# Patient Record
Sex: MALE | Race: WHITE | NOT HISPANIC OR LATINO | Employment: FULL TIME | URBAN - METROPOLITAN AREA
[De-identification: names, ages, dates, MRNs, and addresses within clinical notes are randomized per-mention and may not be internally consistent; named-entity substitution may affect disease eponyms.]

---

## 2017-01-27 ENCOUNTER — OFFICE VISIT (OUTPATIENT)
Dept: SLEEP MEDICINE | Facility: CLINIC | Age: 52
End: 2017-01-27
Payer: COMMERCIAL

## 2017-01-27 VITALS
HEART RATE: 74 BPM | BODY MASS INDEX: 41.75 KG/M2 | DIASTOLIC BLOOD PRESSURE: 79 MMHG | HEIGHT: 73 IN | SYSTOLIC BLOOD PRESSURE: 145 MMHG | WEIGHT: 315 LBS | OXYGEN SATURATION: 94 %

## 2017-01-27 DIAGNOSIS — G47.33 OSA (OBSTRUCTIVE SLEEP APNEA): Primary | ICD-10-CM

## 2017-01-27 PROCEDURE — 99213 OFFICE O/P EST LOW 20 MIN: CPT | Performed by: PHYSICIAN ASSISTANT

## 2017-01-27 NOTE — PATIENT INSTRUCTIONS
Your BMI is There is no weight on file to calculate BMI.  Weight management is a personal decision.  If you are interested in exploring weight loss strategies, the following discussion covers the approaches that may be successful. Body mass index (BMI) is one way to tell whether you are at a healthy weight, overweight, or obese. It measures your weight in relation to your height.  A BMI of 18.5 to 24.9 is in the healthy range. A person with a BMI of 25 to 29.9 is considered overweight, and someone with a BMI of 30 or greater is considered obese. More than two-thirds of American adults are considered overweight or obese.  Being overweight or obese increases the risk for further weight gain. Excess weight may lead to heart disease and diabetes.  Creating and following plans for healthy eating and physical activity may help you improve your health.  Weight control is part of healthy lifestyle and includes exercise, emotional health, and healthy eating habits. Careful eating habits lifelong are the mainstay of weight control. Though there are significant health benefits from weight loss, long-term weight loss with diet alone may be very difficult to achieve- studies show long-term success with dietary management in less than 10% of people. Attaining a healthy weight may be especially difficult to achieve in those with severe obesity. In some cases, medications, devices and surgical management might be considered.  What can you do?  If you are overweight or obese and are interested in methods for weight loss, you should discuss this with your provider.     Consider reducing daily calorie intake by 500 calories.     Keep a food journal.     Avoiding skipping meals, consider cutting portions instead.    Diet combined with exercise helps maintain muscle while optimizing fat loss. Strength training is particularly important for building and maintaining muscle mass. Exercise helps reduce stress, increase energy, and  improves fitness. Increasing exercise without diet control, however, may not burn enough calories to loose weight.       Start walking three days a week 10-20 minutes at a time    Work towards walking thirty minutes five days a week     Eventually, increase the speed of your walking for 1-2 minutes at time    In addition, we recommend that you review healthy lifestyles and methods for weight loss available through the National Institutes of Health patient information sites:  http://win.niddk.nih.gov/publications/index.htm    And look into health and wellness programs that may be available through your health insurance provider, employer, local community center, or ingrid club.    Weight management plan: Patient was referred to their PCP to discuss a diet and exercise plan.

## 2017-01-27 NOTE — PROGRESS NOTES
Obstructive Sleep Apnea- PAP Follow-Up Visit:    Chief Complaint   Patient presents with     Sleep Problem     cpap f/u       Sharif Giraldo comes in today for follow-up of their very severe sleep apnea, managed with BPAP.  Download report shows 97% compliance, 7 hours and 50 minute average usage. Average IPAP 13/EPAP 10. AHI 0.8. He needs new supplies, due to mask and head gear breaking. Otherwise it is going very well, he has difficulty sleeping without it.         Past medical/surgical history, family history, social history, medications and allergies were reviewed.      Problem List:  Patient Active Problem List    Diagnosis Date Noted     Major depressive disorder, recurrent episode, mild (H) 09/23/2016     Priority: Medium     Thalassemia carrier 06/11/2013     Hypogonadism male 05/30/2013     LUH (obstructive sleep apnea)-very severe () 05/14/2013     Indications for Polysomnography 5/8/2013: The patient is a 47 y year old Male who is 6' and weighs 448.1 lbs.  His BMI equals 61.3.  The patients [place] sleepiness scale was 21.0 and neck size was 21.5.  A diagnostic polysomnogram was performed to evaluate for excessive sleepiness, snoring, and possible LUH. After 121.0 minutes of sleep time the patient exhibited sufficient respiratory events qualifying him for a CPAP trial which was then initiated.      Polysomnogram Data:  A full night polysomnogram was performed recording the standard physiologic parameters including EEG, EOG, EMG, EKG, nasal and oral airflow.  Respiratory parameters of chest and abdominal movements are recorded with respiratory inductance plethysmography.  Oxygen saturation was recorded by pulse oximetry.      Diagnostic PSG  Sleep Architecture:  The total recording time of the diagnostic portion of the study was 134.7 minutes.  The total sleep time was 121.0 minutes.  During the diagnostic portion of the study the sleep latency was 0.2 minutes.  REM latency was 116.0 minutes.   Sleep Efficiency was 89.8%.  Wake after sleep onset was 13.0.   The patient spent 21.5% of total sleep time in Stage N1, 69.8% in Stage N2, 2.9% in Stages N3 and 5.8% in REM.         Respiration:     Sustained Sleep Associated Hypoventilation - was present with a baseline TCM of 45 and a maximum TCM of 54. PaCO2 was 44.    Sleep Associated Hypoxemia - was present and was very severe.  Baseline oxygen saturation was 86.8%.  The lowest oxygen saturation was 32.0%.  Snoring was reported as moderately loud to loud.     Events - During the diagnostic portion of the study, the polysomnogram revealed a presence of 167 obstructive, 1 central, and 1 mixed apneas resulting in an Apnea index of 83.8 events per hour.  There were 38 hypopneas resulting in a Hypopnea index of 18.8 events per hour.  The combined Apnea/Hypopnea Index was 102.6 events per hour.  The REM AHI was 60.0.  The RERA index was 0 per hour.   The RDI was 103.    - 102.6     Treatment PSG  Sleep Architecture:  At 12:24 am the patient was placed on CPAP and then bi-level treatment was titrated.  The total recording time of the treatment portion of the study was 366.2 minutes.  The total sleep time was 350.5 minutes.  During the treatment portion of the study the sleep latency was 0 minutes.  REM latency was 66.0 minutes.  Sleep Efficiency was 95.7%.  Sleep Maintenance Efficiency was 96.0%.  Total wake time was 16.0 minutes for a total wake percentage of 3.8%. the patient spent 6.3% of total sleep time in Stage N1, 20.1% in Stage N2, 40.7% in Stages N3 and N, and 33.0% in REM.       Respiration:  CPAP was titrated from 5 cmH2O to 1 5cmH20 but oxygen desaturations persisted into the 70%s so bi-level PAP was initiated.  Bi-level was titrated to 20/49ybM81 in response to hypopneas and low O2 saturations.  The final pressure was 20.0/14.0 with an AHI of 11.0 all of which were central apneas. Supine REM noted at 18/13-12 cmH2O. AHI was 11 but all were centrals. TCM  returned to improved levels (38) with Bi-level.     Movement Activity:      Limb - During the diagnostic portion of the study, there were no limb movements recorded. During the treatment portion of the study, there were 376 limb movements recorded.  Of this total, 367 were classified as PLMs.  Of the PLMs, none were associated with arousals.  The Limb Movement index was 64.4 per hour while the PLM index was 62.8 per hour.     Behavior - none    Bruxism - none    Seizure - none      CARDIAC SUMMARY:   During the diagnostic portion of the study, the average pulse rate was 84.7 bpm.  The minimum pulse rate was 35.0 bpm while the maximum pulse rate was 124.3 bpm.    During the treatment portion of the study, the average pulse rate was 80.8 bpm.  The minimum pulse rate was 53.8 bpm while the maximum pulse rate was 125.1 bpm.       Assessment:    Very severe LUH () with adequate positive airway pressure titration.    Sleep associated hypoventilation secondary to obesity (OHS)  Recommendations:    Bi-level PAP pressure 18/13 cmH2O with clinical follow-up within 3 - 4 weeks including compliance measures. If pressure too high, or if AHI unacceptably high, proceed to full night CPAP/Bi-level PAP titration study.     Advise regarding the risks of drowsy driving    Suggest optimizing sleep hygiene and avoiding sleep deprivation    Weight management    Dopaminergic therapy should be used for management of restless leg syndrome (RLS) and not based on the presence of periodic limb movements alone.       Sleep related hypoventilation/hypoxemia in other disease 05/14/2013     Problem list name updated by automated process. Provider to review       Nocturia 04/18/2013     Anemia 04/18/2013     Hypertension goal BP (blood pressure) < 140/90 10/18/2012     CARDIOVASCULAR SCREENING; LDL GOAL LESS THAN 130 10/31/2010     Morbid obesity (H) 05/07/2008     Umbilical hernia 08/13/2002     Problem list name updated by automated  "process. Provider to review          /79 mmHg  Pulse 74  Ht 1.854 m (6' 1\")  Wt 176.903 kg (390 lb)  BMI 51.47 kg/m2  SpO2 94%        Impression/Plan:  Severe Sleep apnea. He is Tolerating PAP well. Daytime symptoms are improved..       Sharif CHATA Giraldo will follow up in about 1 year(s).     Fifteen minutes spent with patient, all of which were spent face-to-face counseling, consulting, coordinating plan of care.            CC:  Avery Zapata,         "

## 2017-01-27 NOTE — MR AVS SNAPSHOT
After Visit Summary   1/27/2017    Sharif Giraldo    MRN: [id number]           Patient Information     Date Of Birth          1965        Visit Information        Provider Department      1/27/2017 8:00 AM Raulito Greenberg PA-C [facility] [place]        Today's Diagnoses     LUH (obstructive sleep apnea)    -  1       Care Instructions      Your BMI is There is no weight on file to calculate BMI.  Weight management is a personal decision.  If you are interested in exploring weight loss strategies, the following discussion covers the approaches that may be successful. Body mass index (BMI) is one way to tell whether you are at a healthy weight, overweight, or obese. It measures your weight in relation to your height.  A BMI of 18.5 to 24.9 is in the healthy range. A person with a BMI of 25 to 29.9 is considered overweight, and someone with a BMI of 30 or greater is considered obese. More than two-thirds of American adults are considered overweight or obese.  Being overweight or obese increases the risk for further weight gain. Excess weight may lead to heart disease and diabetes.  Creating and following plans for healthy eating and physical activity may help you improve your health.  Weight control is part of healthy lifestyle and includes exercise, emotional health, and healthy eating habits. Careful eating habits lifelong are the mainstay of weight control. Though there are significant health benefits from weight loss, long-term weight loss with diet alone may be very difficult to achieve- studies show long-term success with dietary management in less than 10% of people. Attaining a healthy weight may be especially difficult to achieve in those with severe obesity. In some cases, medications, devices and surgical management might be considered.  What can you do?  If you are overweight or obese and are interested in methods for weight loss, you should discuss this with your  provider.     Consider reducing daily calorie intake by 500 calories.     Keep a food journal.     Avoiding skipping meals, consider cutting portions instead.    Diet combined with exercise helps maintain muscle while optimizing fat loss. Strength training is particularly important for building and maintaining muscle mass. Exercise helps reduce stress, increase energy, and improves fitness. Increasing exercise without diet control, however, may not burn enough calories to loose weight.       Start walking three days a week 10-20 minutes at a time    Work towards walking thirty minutes five days a week     Eventually, increase the speed of your walking for 1-2 minutes at time    In addition, we recommend that you review healthy lifestyles and methods for weight loss available through the National Institutes of Health patient information sites:  http://win.niddk.nih.gov/publications/index.htm    And look into health and wellness programs that may be available through your health insurance provider, employer, local community center, or ingrid club.    Weight management plan: Patient was referred to their PCP to discuss a diet and exercise plan.            Follow-ups after your visit        Who to contact     If you have questions or need follow up information about today's clinic visit or your schedule please contact [facility] directly at [phone].  Normal or non-critical lab and imaging results will be communicated to you by MyChart, letter or phone within 4 business days after the clinic has received the results. If you do not hear from us within 7 days, please contact the clinic through InnerWirelesshart or phone. If you have a critical or abnormal lab result, we will notify you by phone as soon as possible.  Submit refill requests through Appuri or call your pharmacy and they will forward the refill request to us. Please allow 3 business days for your refill to be completed.          Additional  "Information About Your Visit        MyChart Information     [employer] lets you send messages to your doctor, view your test results, renew your prescriptions, schedule appointments and more. To sign up, go to [email or web address] . Click on \"Log in\" on the left side of the screen, which will take you to the Welcome page. Then click on \"Sign up Now\" on the right side of the page.     You will be asked to enter the access code listed below, as well as some personal information. Please follow the directions to create your username and password.     Your access code is: [email or web address]  Expires: 2017  8:35 AM     Your access code will  in 90 days. If you need help or a new code, please call your [place] clinic or [phone].        Care EveryWhere ID     This is your Care EveryWhere ID. This could be used by other organizations to access your [place] medical records  [id number]        Your Vitals Were     Pulse Height BMI (Body Mass Index) Pulse Oximetry          74 1.854 m (6' 1\") 51.47 kg/m2 94%         Blood Pressure from Last 3 Encounters:   17 145/79   16 148/96   10/24/16 122/78    Weight from Last 3 Encounters:   17 176.903 kg (390 lb)   10/24/16 173.274 kg (382 lb)   16 174.635 kg (385 lb)              We Performed the Following     Comprehensive DME        Primary Care Provider Office Phone # Fax #    Avery Zapata [phone]       [facility] [address] [place] [address]        Thank you!     Thank you for choosing [facility]  for your care. Our goal is always to provide you with excellent care. Hearing back from our patients is one way we can continue to improve our services. Please take a few minutes to complete the written survey that you may receive in the mail after your visit with us. Thank you!             Your Updated Medication List - Protect others around you: Learn how to safely use, store " and throw away your medicines at www.disposemymeds.org.          This list is accurate as of: 1/27/17  8:35 AM.  Always use your most recent med list.                   Brand Name Dispense Instructions for use    calcium carb 1250 mg (500 mg [place])/vitamin D 200 units 500-200 MG-UNIT per tablet    OSCAL with D    1 tablet    Take 600mg daily       FLUoxetine 40 MG capsule    PROzac    30 capsule    TAKE ONE CAPSULE BY MOUTH EVERY DAY       GLUCOSAMINE 1500 COMPLEX Caps          lisinopril-hydrochlorothiazide 20-25 MG per tablet    PRINZIDE/ZESTORETIC    90 tablet    Take 1 tablet by mouth daily       meloxicam 15 MG tablet    MOBIC    90 tablet    TAKE ONE TABLET BY MOUTH EVERY DAY       order for DME     1 Device    Dispensed 1 Dorsal (Anterior) Night Splint, Size L/XL, with [facility] agreement signed by patient. Angy Garner [facility] [address], November 30, 2015       tadalafil 5 MG tablet    CIALIS    30 tablet    Take 1 tablet (5 mg) by mouth daily Never use with nitroglycerin, terazosin or doxazosin.       testosterone cypionate 200 MG/ML injection    DEPOTESTOTERONE CYPIONATE    1 mL    Inject 1 mL (200 mg) into the muscle every 28 days

## 2017-01-27 NOTE — NURSING NOTE
"Chief Complaint   Patient presents with     Sleep Problem     cpap f/u       Initial /79 mmHg  Pulse 74  Ht 1.854 m (6' 1\")  Wt 176.903 kg (390 lb)  BMI 51.47 kg/m2  SpO2 94% Estimated body mass index is 51.47 kg/(m^2) as calculated from the following:    Height as of this encounter: 1.854 m (6' 1\").    Weight as of this encounter: 176.903 kg (390 lb).  BP completed using cuff size: large    "

## 2017-03-03 ENCOUNTER — ALLIED HEALTH/NURSE VISIT (OUTPATIENT)
Dept: FAMILY MEDICINE | Facility: CLINIC | Age: 52
End: 2017-03-03
Payer: COMMERCIAL

## 2017-03-03 DIAGNOSIS — E29.1 HYPOGONADISM MALE: Primary | ICD-10-CM

## 2017-03-03 PROCEDURE — 96372 THER/PROPH/DIAG INJ SC/IM: CPT

## 2017-03-03 NOTE — NURSING NOTE
Chief Complaint   Patient presents with     Imm/Inj     testosterone injection     Prior to injection verified patient identity using patient's name and date of birth. Instucted to wait for 20minutes after injection.  Alexandra PANG MA

## 2017-03-03 NOTE — MR AVS SNAPSHOT
"              After Visit Summary   3/3/2017    Sharif Giraldo    MRN: [id number]           Patient Information     Date Of Birth          1965        Visit Information        Provider Department      3/3/2017 3:00 PM NL FLOAT TEAM D [facility]        Today's Diagnoses     Hypogonadism male    -  1       Follow-ups after your visit        Who to contact     If you have questions or need follow up information about today's clinic visit or your schedule please contact [facility] directly at [phone].  Normal or non-critical lab and imaging results will be communicated to you by Kuonahart, letter or phone within 4 business days after the clinic has received the results. If you do not hear from us within 7 days, please contact the clinic through Kuonahart or phone. If you have a critical or abnormal lab result, we will notify you by phone as soon as possible.  Submit refill requests through YoQueVos or call your pharmacy and they will forward the refill request to us. Please allow 3 business days for your refill to be completed.          Additional Information About Your Visit        MyChart Information     [employer] lets you send messages to your doctor, view your test results, renew your prescriptions, schedule appointments and more. To sign up, go to [email or web address] . Click on \"Log in\" on the left side of the screen, which will take you to the Welcome page. Then click on \"Sign up Now\" on the right side of the page.     You will be asked to enter the access code listed below, as well as some personal information. Please follow the directions to create your username and password.     Your access code is: [email or web address]  Expires: 2017  8:35 AM     Your access code will  in 90 days. If you need help or a new code, please call your [facility] or [phone].        Care EveryWhere ID     This is your Care EveryWhere ID. This could be used by other " organizations to access your [place] medical records  [id number]         Blood Pressure from Last 3 Encounters:   01/27/17 145/79   12/08/16 (!) 148/96   10/24/16 122/78    Weight from Last 3 Encounters:   01/27/17 (!) 390 lb (176.9 kg)   10/24/16 (!) 382 lb (173.3 kg)   09/23/16 (!) 385 lb (174.6 kg)              We Performed the Following     C TESTOSTERONE CYPIONATE INJECTION, 1 MG     INJECTION INTRAMUSCULAR OR SUB-Q        Primary Care Provider Office Phone # Fax #    Avery Zapata [phone]       [place] [address] [place] [address]        Thank you!     Thank you for choosing [facility]  for your care. Our goal is always to provide you with excellent care. Hearing back from our patients is one way we can continue to improve our services. Please take a few minutes to complete the written survey that you may receive in the mail after your visit with us. Thank you!             Your Updated Medication List - Protect others around you: Learn how to safely use, store and throw away your medicines at www.disposemymeds.org.          This list is accurate as of: 3/3/17  3:09 PM.  Always use your most recent med list.                   Brand Name Dispense Instructions for use    calcium carb 1250 mg (500 mg [place])/vitamin D 200 units 500-200 MG-UNIT per tablet    OSCAL with D    1 tablet    Take 600mg daily       FLUoxetine 40 MG capsule    PROzac    30 capsule    TAKE ONE CAPSULE BY MOUTH EVERY DAY       GLUCOSAMINE 1500 COMPLEX Caps          lisinopril-hydrochlorothiazide 20-25 MG per tablet    PRINZIDE/ZESTORETIC    90 tablet    Take 1 tablet by mouth daily       meloxicam 15 MG tablet    MOBIC    90 tablet    TAKE ONE TABLET BY MOUTH EVERY DAY       order for DME     1 Device    Dispensed 1 Dorsal (Anterior) Night Splint, Size L/XL, with [facility] agreement signed by patient. Angy Garner [facility], November 30, 2015       tadalafil 5 MG tablet     CIALIS    30 tablet    Take 1 tablet (5 mg) by mouth daily Never use with nitroglycerin, terazosin or doxazosin.       testosterone cypionate 200 MG/ML injection    DEPOTESTOTERONE CYPIONATE    1 mL    Inject 1 mL (200 mg) into the muscle every 28 days

## 2017-04-06 ENCOUNTER — ALLIED HEALTH/NURSE VISIT (OUTPATIENT)
Dept: FAMILY MEDICINE | Facility: CLINIC | Age: 52
End: 2017-04-06
Payer: COMMERCIAL

## 2017-04-06 DIAGNOSIS — E29.1 HYPOGONADISM MALE: Primary | ICD-10-CM

## 2017-04-06 DIAGNOSIS — M17.12 PRIMARY OSTEOARTHRITIS OF LEFT KNEE: ICD-10-CM

## 2017-04-06 PROCEDURE — 96372 THER/PROPH/DIAG INJ SC/IM: CPT

## 2017-04-06 NOTE — NURSING NOTE
Prior to injection verified patient identity using patient's name and date of birth.  Pt instructed to wait 20 minutes in clinic after injection.

## 2017-04-06 NOTE — MR AVS SNAPSHOT
"              After Visit Summary   2017    Sharif Giraldo    MRN: [id number]           Patient Information     Date Of Birth          1965        Visit Information        Provider Department      2017 4:00 PM NL FLOAT TEAM D [facility]        Today's Diagnoses     Hypogonadism male    -  1       Follow-ups after your visit        Who to contact     If you have questions or need follow up information about today's clinic visit or your schedule please contact [facility] directly at [phone].  Normal or non-critical lab and imaging results will be communicated to you by I-MDhart, letter or phone within 4 business days after the clinic has received the results. If you do not hear from us within 7 days, please contact the clinic through I-MDhart or phone. If you have a critical or abnormal lab result, we will notify you by phone as soon as possible.  Submit refill requests through tadoÂ° or call your pharmacy and they will forward the refill request to us. Please allow 3 business days for your refill to be completed.          Additional Information About Your Visit        MyChart Information     tadoÂ° lets you send messages to your doctor, view your test results, renew your prescriptions, schedule appointments and more. To sign up, go to [email or web address] . Click on \"Log in\" on the left side of the screen, which will take you to the Welcome page. Then click on \"Sign up Now\" on the right side of the page.     You will be asked to enter the access code listed below, as well as some personal information. Please follow the directions to create your username and password.     Your access code is: [email or web address]  Expires: 2017  9:35 AM     Your access code will  in 90 days. If you need help or a new code, please call your [facility] or [phone].        Care EveryWhere ID     This is your Care EveryWhere ID. This could be used by other " organizations to access your [place] medical records  [id number]         Blood Pressure from Last 3 Encounters:   01/27/17 145/79   12/08/16 (!) 148/96   10/24/16 122/78    Weight from Last 3 Encounters:   01/27/17 (!) 390 lb (176.9 kg)   10/24/16 (!) 382 lb (173.3 kg)   09/23/16 (!) 385 lb (174.6 kg)              We Performed the Following     C TESTOSTERONE CYPIONATE INJECTION, 1 MG     INJECTION INTRAMUSCULAR OR SUB-Q        Primary Care Provider Office Phone # Fax #    Avery Zaapta [phone]       [place] [id number] [place] [address]        Thank you!     Thank you for choosing [facility]  for your care. Our goal is always to provide you with excellent care. Hearing back from our patients is one way we can continue to improve our services. Please take a few minutes to complete the written survey that you may receive in the mail after your visit with us. Thank you!             Your Updated Medication List - Protect others around you: Learn how to safely use, store and throw away your medicines at www.disposemymeds.org.          This list is accurate as of: 4/6/17  4:20 PM.  Always use your most recent med list.                   Brand Name Dispense Instructions for use    calcium carb 1250 mg (500 mg [place])/vitamin D 200 units 500-200 MG-UNIT per tablet    OSCAL with D    1 tablet    Take 600mg daily       FLUoxetine 40 MG capsule    PROzac    30 capsule    TAKE 1 CAPSULE BY MOUTH DAILY       GLUCOSAMINE 1500 COMPLEX Caps          lisinopril-hydrochlorothiazide 20-25 MG per tablet    PRINZIDE/ZESTORETIC    90 tablet    Take 1 tablet by mouth daily       meloxicam 15 MG tablet    MOBIC    90 tablet    TAKE ONE TABLET BY MOUTH EVERY DAY       order for DME     1 Device    Dispensed 1 Dorsal (Anterior) Night Splint, Size L/XL, with [facility] agreement signed by patient. Angy Garner CMA, November 30, 2015       tadalafil 5 MG tablet    CIALIS     30 tablet    Take 1 tablet (5 mg) by mouth daily Never use with nitroglycerin, terazosin or doxazosin.       testosterone cypionate 200 MG/ML injection    DEPOTESTOTERONE CYPIONATE    1 mL    Inject 1 mL (200 mg) into the muscle every 28 days

## 2017-04-10 RX ORDER — MELOXICAM 15 MG/1
TABLET ORAL
Qty: 90 TABLET | Refills: 1 | Status: SHIPPED | OUTPATIENT
Start: 2017-04-10 | End: 2017-10-05

## 2017-04-10 NOTE — TELEPHONE ENCOUNTER
Prescription approved per [facility] Refill Protocol.    Carmelina Gore RN

## 2017-05-17 DIAGNOSIS — E29.1 HYPOGONADISM MALE: ICD-10-CM

## 2017-05-17 NOTE — TELEPHONE ENCOUNTER
Called and spoke to patient, will have provider refill and fax to [place] pharmacy when in clinic. Patient agrees. Zeynep Chaidez RN   Pharmacy Contact   Telephone Fax   [phone]

## 2017-05-17 NOTE — TELEPHONE ENCOUNTER
Reason for Call:  Other prescription    Detailed comments: Patient is requesting order for testosterone injection shot to his pharmacy. Please call.    Phone Number Patient can be reached at: Home number on file [phone] (home)    Best Time: any    Can we leave a detailed message on this number? YES    Call taken on 5/17/2017 at 12:20 PM by Anjali Jimenez

## 2017-05-18 RX ORDER — TESTOSTERONE CYPIONATE 200 MG/ML
200 INJECTION, SOLUTION INTRAMUSCULAR
Qty: 1 ML | Refills: 5 | Status: SHIPPED | OUTPATIENT
Start: 2017-05-18 | End: 2018-01-04

## 2017-05-19 ENCOUNTER — ALLIED HEALTH/NURSE VISIT (OUTPATIENT)
Dept: FAMILY MEDICINE | Facility: CLINIC | Age: 52
End: 2017-05-19
Payer: COMMERCIAL

## 2017-05-19 DIAGNOSIS — E29.1 HYPOGONADISM MALE: Primary | ICD-10-CM

## 2017-05-19 PROCEDURE — 99207 ZZC NO CHARGE NURSE ONLY: CPT

## 2017-05-19 NOTE — MR AVS SNAPSHOT
"              After Visit Summary   2017    Sharif Giraldo    MRN: [id number]           Patient Information     Date Of Birth          1965        Visit Information        Provider Department      2017 11:00 AM NL RN TEAM A, [facility]        Today's Diagnoses     Hypogonadism male    -  1       Follow-ups after your visit        Who to contact     If you have questions or need follow up information about today's clinic visit or your schedule please contact [facility] directly at [phone].  Normal or non-critical lab and imaging results will be communicated to you by Penzatahart, letter or phone within 4 business days after the clinic has received the results. If you do not hear from us within 7 days, please contact the clinic through Penzatahart or phone. If you have a critical or abnormal lab result, we will notify you by phone as soon as possible.  Submit refill requests through Utan or call your pharmacy and they will forward the refill request to us. Please allow 3 business days for your refill to be completed.          Additional Information About Your Visit        MyChart Information     Utan lets you send messages to your doctor, view your test results, renew your prescriptions, schedule appointments and more. To sign up, go to [email or web address] . Click on \"Log in\" on the left side of the screen, which will take you to the Welcome page. Then click on \"Sign up Now\" on the right side of the page.     You will be asked to enter the access code listed below, as well as some personal information. Please follow the directions to create your username and password.     Your access code is: [email or web address]  Expires: 2017 12:45 PM     Your access code will  in 90 days. If you need help or a new code, please call your [facility] or [phone].        Care EveryWhere ID     This is your Care EveryWhere ID. This could be used by other " organizations to access your [place] medical records  [id number]         Blood Pressure from Last 3 Encounters:   01/27/17 145/79   12/08/16 (!) 148/96   10/24/16 122/78    Weight from Last 3 Encounters:   01/27/17 (!) 390 lb (176.9 kg)   10/24/16 (!) 382 lb (173.3 kg)   09/23/16 (!) 385 lb (174.6 kg)              Today, you had the following     No orders found for display       Primary Care Provider Office Phone # Fax #    Avery Zapata [phone]       [facility] [address] [place] [address]        Thank you!     Thank you for choosing [facility]  for your care. Our goal is always to provide you with excellent care. Hearing back from our patients is one way we can continue to improve our services. Please take a few minutes to complete the written survey that you may receive in the mail after your visit with us. Thank you!             Your Updated Medication List - Protect others around you: Learn how to safely use, store and throw away your medicines at www.disposemymeds.org.          This list is accurate as of: 5/19/17 12:45 PM.  Always use your most recent med list.                   Brand Name Dispense Instructions for use    calcium carb 1250 mg (500 mg [place])/vitamin D 200 units 500-200 MG-UNIT per tablet    OSCAL with D    1 tablet    Take 600mg daily       FLUoxetine 40 MG capsule    PROzac    30 capsule    TAKE 1 CAPSULE BY MOUTH DAILY       GLUCOSAMINE 1500 COMPLEX Caps          lisinopril-hydrochlorothiazide 20-25 MG per tablet    PRINZIDE/ZESTORETIC    90 tablet    Take 1 tablet by mouth daily       meloxicam 15 MG tablet    MOBIC    90 tablet    TAKE ONE TABLET BY MOUTH EVERY DAY       order for DME     1 Device    Dispensed 1 Dorsal (Anterior) Night Splint, Size L/XL, with [facility] agreement signed by patient. Angy Garner CMA, November 30, 2015       tadalafil 5 MG tablet    CIALIS    30 tablet    Take 1 tablet (5 mg) by mouth daily  Never use with nitroglycerin, terazosin or doxazosin.       testosterone cypionate 200 MG/ML injection    DEPOTESTOTERONE    1 mL    Inject 1 mL (200 mg) into the muscle every 28 days

## 2017-05-19 NOTE — NURSING NOTE
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  Patient is educated on site, preventing infection, drawing up medication, and self-injection in the vastus-laterals.  Patient showed appropriate understanding and completed this injection without issue.  Patient states he will attempt this injection at home and if he is unable to do it, he will call back to get in clinic again.  RN called and gave verbal order to pharmacy for 1.5\" 25 guage needle and 3 mL syringes to be given monthly when he picks up prescription.    Patient has been informed to stay in clinic 20 minutes after injection.  Patient understands and agrees to this plan.  Closing this encounter.  Candace Fam RN      "

## 2017-05-30 ENCOUNTER — TELEPHONE (OUTPATIENT)
Dept: FAMILY MEDICINE | Facility: CLINIC | Age: 52
End: 2017-05-30

## 2017-05-30 DIAGNOSIS — S99.911A: Primary | ICD-10-CM

## 2017-05-30 NOTE — TELEPHONE ENCOUNTER
Reason for Call: Request for a referral:    Order or referral being requested: Patient is needing referral to an ankle doctor for insurance, patients states the same one from 12/2015.  Patient was advised that Dr Zapata is not in clinic today    Date needed: as soon as possible    Has the patient been seen by the PCP for this problem? YES    Additional comments:     Phone number Patient can be reached at:  Home number on file [phone] (home)    Best Time:  any    Can we leave a detailed message on this number?  YES    Call taken on 5/30/2017 at 7:50 AM by Amy Hector

## 2017-05-31 NOTE — TELEPHONE ENCOUNTER
Per Dr. Benny cortez for podiatry referral for ankle pain. He has seen Dr. Leung in the past for this same issue but needs a new referral. Bharti montilla you please help him with this. KB/CMA

## 2017-06-15 ENCOUNTER — RADIANT APPOINTMENT (OUTPATIENT)
Dept: GENERAL RADIOLOGY | Facility: CLINIC | Age: 52
End: 2017-06-15
Attending: PODIATRIST
Payer: COMMERCIAL

## 2017-06-15 ENCOUNTER — OFFICE VISIT (OUTPATIENT)
Dept: PODIATRY | Facility: CLINIC | Age: 52
End: 2017-06-15
Payer: COMMERCIAL

## 2017-06-15 VITALS — BODY MASS INDEX: 41.75 KG/M2 | WEIGHT: 315 LBS | TEMPERATURE: 97 F | HEIGHT: 73 IN

## 2017-06-15 DIAGNOSIS — M76.821 POSTERIOR TIBIAL TENDONITIS, RIGHT: ICD-10-CM

## 2017-06-15 DIAGNOSIS — M24.074 LOOSE BODY IN ANKLE AND FOOT JOINT, RIGHT: ICD-10-CM

## 2017-06-15 DIAGNOSIS — M24.071 LOOSE BODY IN ANKLE AND FOOT JOINT, RIGHT: ICD-10-CM

## 2017-06-15 DIAGNOSIS — M19.071 PRIMARY LOCALIZED OSTEOARTHROSIS, ANKLE AND FOOT, RIGHT: ICD-10-CM

## 2017-06-15 DIAGNOSIS — M65.979 SYNOVITIS OF ANKLE: Primary | ICD-10-CM

## 2017-06-15 DIAGNOSIS — M65.979 SYNOVITIS OF ANKLE: ICD-10-CM

## 2017-06-15 PROCEDURE — 20605 DRAIN/INJ JOINT/BURSA W/O US: CPT | Mod: RT | Performed by: PODIATRIST

## 2017-06-15 PROCEDURE — 99213 OFFICE O/P EST LOW 20 MIN: CPT | Mod: 25 | Performed by: PODIATRIST

## 2017-06-15 PROCEDURE — 73610 X-RAY EXAM OF ANKLE: CPT | Mod: TC

## 2017-06-15 ASSESSMENT — PAIN SCALES - GENERAL: PAINLEVEL: MILD PAIN (2)

## 2017-06-15 NOTE — NURSING NOTE
"Chief Complaint   Patient presents with     RECHECK     Right ankle synovitis, pain 2-8, wears ankle brace daily; XR Right ankle today, 6/15/2017; LOV 2/1/2016       Initial Temp 97  F (36.1  C) (Temporal)  Ht 6' 1\" (1.854 m)  Wt (!) 393 lb (178.3 kg)  BMI 51.85 kg/m2 Estimated body mass index is 51.85 kg/(m^2) as calculated from the following:    Height as of this encounter: 6' 1\" (1.854 m).    Weight as of this encounter: 393 lb (178.3 kg).  BP completed using cuff size: NA (Not Taken)  Medication Reconciliation: complete    Angy Garner CMA, Donna 15, 2017    "

## 2017-06-15 NOTE — MR AVS SNAPSHOT
"              After Visit Summary   6/15/2017    Sharif Giraldo    MRN: [id number]           Patient Information     Date Of Birth          1965        Visit Information        Provider Department      6/15/2017 7:45 AM Beni Leung DPM [facility]        Today's Diagnoses     Synovitis of ankle    -  1    Primary localized osteoarthrosis, ankle and foot, right        Posterior tibial tendonitis, right        Loose body in ankle and foot joint, right          Care Instructions    Light duty today. Continue appropriate shoe gear and orthotic.          Follow-ups after your visit        Who to contact     If you have questions or need follow up information about today's clinic visit or your schedule please contact [facility] directly at [phone].  Normal or non-critical lab and imaging results will be communicated to you by Pingboardhart, letter or phone within 4 business days after the clinic has received the results. If you do not hear from us within 7 days, please contact the clinic through Pingboardhart or phone. If you have a critical or abnormal lab result, we will notify you by phone as soon as possible.  Submit refill requests through [employer] or call your pharmacy and they will forward the refill request to us. Please allow 3 business days for your refill to be completed.          Additional Information About Your Visit        Pingboardhart Information     [employer] lets you send messages to your doctor, view your test results, renew your prescriptions, schedule appointments and more. To sign up, go to [email or web address] . Click on \"Log in\" on the left side of the screen, which will take you to the Welcome page. Then click on \"Sign up Now\" on the right side of the page.     You will be asked to enter the access code listed below, as well as some personal information. Please follow the directions to create your username and password.     Your access code is: " "[email or web address]  Expires: 2017 12:45 PM     Your access code will  in 90 days. If you need help or a new code, please call your [facility] or [phone].        Care EveryWhere ID     This is your Care EveryWhere ID. This could be used by other organizations to access your [place] medical records  [id number]        Your Vitals Were     Temperature Height BMI (Body Mass Index)             97  F (36.1  C) (Temporal) 6' 1\" (1.854 m) 51.85 kg/m2          Blood Pressure from Last 3 Encounters:   17 145/79   16 (!) 148/96   10/24/16 122/78    Weight from Last 3 Encounters:   06/15/17 (!) 393 lb (178.3 kg)   17 (!) 390 lb (176.9 kg)   10/24/16 (!) 382 lb (173.3 kg)              We Performed the Following     DRAIN/INJECT MEDIUM JOINT/BURSA     Kenalog 20 MG  []          Today's Medication Changes          These changes are accurate as of: 6/15/17  8:28 AM.  If you have any questions, ask your nurse or doctor.               Start taking these medicines.        Dose/Directions    triamcinolone acetonide 10 MG/ML injection   Commonly known as:  KENALOG   Used for:  Synovitis of ankle, Primary localized osteoarthrosis, ankle and foot, right, Posterior tibial tendonitis, right   Started by:  Beni Leung DPM        Dose:  20 mg   2 mLs (20 mg) by INTRA-ARTICULAR route once for 1 dose   Quantity:  2 mL   Refills:  0            Where to get your medicines      Some of these will need a paper prescription and others can be bought over the counter.  Ask your nurse if you have questions.     You don't need a prescription for these medications     triamcinolone acetonide 10 MG/ML injection                Primary Care Provider Office Phone # Fax #    Avery Zapata [phone]       [place] [id number] [place] [address]        Thank you!     Thank you for choosing [facility]  for your care. Our goal is always to provide " you with excellent care. Hearing back from our patients is one way we can continue to improve our services. Please take a few minutes to complete the written survey that you may receive in the mail after your visit with us. Thank you!             Your Updated Medication List - Protect others around you: Learn how to safely use, store and throw away your medicines at www.disposemymeds.org.          This list is accurate as of: 6/15/17  8:28 AM.  Always use your most recent med list.                   Brand Name Dispense Instructions for use    calcium carb 1250 mg (500 mg [place])/vitamin D 200 units 500-200 MG-UNIT per tablet    OSCAL with D    1 tablet    Take 600mg daily       FLUoxetine 40 MG capsule    PROzac    30 capsule    TAKE 1 CAPSULE BY MOUTH DAILY       GLUCOSAMINE 1500 COMPLEX Caps          lisinopril-hydrochlorothiazide 20-25 MG per tablet    PRINZIDE/ZESTORETIC    90 tablet    Take 1 tablet by mouth daily       meloxicam 15 MG tablet    MOBIC    90 tablet    TAKE ONE TABLET BY MOUTH EVERY DAY       order for DME     1 Device    Dispensed 1 Dorsal (Anterior) Night Splint, Size L/XL, with [facility] agreement signed by patient. Angy Garner, [facility], November 30, 2015       tadalafil 5 MG tablet    CIALIS    30 tablet    Take 1 tablet (5 mg) by mouth daily Never use with nitroglycerin, terazosin or doxazosin.       testosterone cypionate 200 MG/ML injection    DEPOTESTOTERONE    1 mL    Inject 1 mL (200 mg) into the muscle every 28 days       triamcinolone acetonide 10 MG/ML injection    KENALOG    2 mL    2 mLs (20 mg) by INTRA-ARTICULAR route once for 1 dose

## 2017-06-15 NOTE — PROGRESS NOTES
"Chief Complaint   Patient presents with     RECHECK     Right ankle synovitis, pain 2-8, wears ankle brace daily; XR Right ankle today, 6/15/2017; LOV 2/1/2016       Weight management plan: Patient was referred to their PCP to discuss a diet and exercise plan.     HPI:  Right ankle pain about anterior, lateral medial and posterior.  Aching pain and popping and grinding and limping.   Also some sciatic historical pain not now.  Works at a computer all day. Was no pain for one year after injection then about 4/17 upon increase activity pain return slowly. Uses ankle brace.  Shoes are one month old.  One right ankle hyperextension upon falling out of truck about 1994.     EXAM:Vitals: Temp 97  F (36.1  C) (Temporal)  Ht 6' 1\" (1.854 m)  Wt (!) 393 lb (178.3 kg)  BMI 51.85 kg/m2  BMI= Body mass index is 51.85 kg/(m^2).    General appearance: Patient is alert and fully cooperative with history & exam.  No sign of distress is noted during the visit.     Psychiatric: Affect is pleasant & appropriate.  Patient appears motivated to improve health.     Respiratory: Breathing is regular & unlabored while sitting.     HEENT: Hearing is intact to spoken word.  Speech is clear.  No gross evidence of visual impairment that would impact ambulation.     Vascular: DP & PT pulses are intact & regular bilaterally.  No significant edema or varicosities noted.  CFT and skin temperature is normal to both lower extremities.     Neurologic: Lower extremity sensation is intact to light touch.  No evidence of weakness or contracture in the lower extremities.  No evidence of neuropathy.    Dermatologic: Skin is intact to both lower extremities without significant lesions, rash or abrasion.  No paronychia or evidence of soft tissue infection is noted.     Musculoskeletal: Patient is ambulatory with custom molded orthotics. Generalized pes valgus noted. Patient is obese. Adequate range of motion throughout bilateral range of motion of the " ankles with some crepitus bilateral ankles. Previously pain had been noted to the Achilles tendon however today this is most noted along the medial anterior and lateral ankle joint margin. There is clearly some pain along the course of the posterior tibial tendon however no pain with muscle strength testing of the posterior tibial tendon and no localized edema within the posterior tibial tendon. Most pain appears to be within the ankle mortise.    Radiographs: 3 views right ankle weightbearing Demonstrate mild degenerative changes with joint space narrowing and flattening of the ankle mortise with loose body about the medial malleolus.     ASSESSMENT:       ICD-10-CM    1. Synovitis of ankle M65.9 XR Ankle Right G/E 3 Views     DRAIN/INJECT MEDIUM JOINT/BURSA     Kenalog 20 MG  []     triamcinolone acetonide (KENALOG) 10 MG/ML injection   2. Primary localized osteoarthrosis, ankle and foot, right M19.071 XR Ankle Right G/E 3 Views     DRAIN/INJECT MEDIUM JOINT/BURSA     Kenalog 20 MG  []     triamcinolone acetonide (KENALOG) 10 MG/ML injection   3. Posterior tibial tendonitis, right M76.821 XR Ankle Right G/E 3 Views     DRAIN/INJECT MEDIUM JOINT/BURSA     Kenalog 20 MG  []     triamcinolone acetonide (KENALOG) 10 MG/ML injection   4. Loose body in ankle and foot joint, right M24.071     M24.074         PLAN:  Review[facility] in [facility].      11/30/2015   Start night splint for achilles tendonitis until gone for 2 weeks.  Refill mobic for short term follow up with originally prescribing provider or PCP for long term meds as it helped his knee.    Right ankle brace and new orthotics have been helpful  Follow up as needed.     1/11/16  Right medial ankle is main prob.  Still some left ankle pain and some left knee pain.   Works as seated job.   Discussed injection vs imaging today and he wishes to inject.    I obtained informed consent, and injected the right ankle from a medial approach with 20  mg of Kenalog and lidocaine with a sterile dressing applied. We discussed potential risks and complications  Follow-up in one month to determine benefits of the injection. He may be a candidate for ankle arthroscope.  If this does not provide long-term relief we may consider imaging to evaluate joint versus posterior tibial tendon  Continue custom molded orthotics which he has used for 20 years recently replaced 10/15  Continue appropriate shoe gear.    All questions were answered    2/1/16  One right ankle injection provided 80% relief.   He would like to continue with his custom orthotics, right ankle brace and one injection  If this remains symptomatic I would recommend MRI and likely progress to ankle scope  However if this provides long-term relief we may consider repeat injection in time    We discussed potential benefits of alternative treatment options as well as their outcome and postoperative course all questions were answered.    6/15/2017  Patient reports after more than a year follow-up with continued right ankle pain and now Achilles tendon pain.  We discussed etiology and treatment options as well as further diagnostic options  Obtained and interpreted radiographs today  Right ankle injection, 2nd one.  I obtained informed consent, discussed risks and alternative treatments, prepped with ETOH, and injected 20 mg Kenalog and lidocaine about the right ankle.  Sterile dressing applied.     Continue ankle brace as needed, good shoes with straight shank, custom molded orthotics which are in good repair.       Beni Leung DPM

## 2017-07-12 DIAGNOSIS — I10 ESSENTIAL HYPERTENSION WITH GOAL BLOOD PRESSURE LESS THAN 140/90: ICD-10-CM

## 2017-07-13 NOTE — TELEPHONE ENCOUNTER
lisinopril-hydrochlorothiazide (PRINZIDE,ZESTORETIC) 20-25 MG per tablet      Last Written Prescription Date: 9/23/16  Last Fill Quantity: 90, # refills: 2  Last Office Visit with G, P or [facility] prescribing provider: 10/24/16       Potassium   Date Value Ref Range Status   09/23/2016 4.1 3.4 - 5.3 mmol/L Final     Creatinine   Date Value Ref Range Status   09/23/2016 0.70 0.66 - 1.25 mg/dL Final     BP Readings from Last 3 Encounters:   01/27/17 145/79   12/08/16 (!) 148/96   10/24/16 122/78

## 2017-07-14 NOTE — TELEPHONE ENCOUNTER
Routing refill request to provider for review/approval because:  Labs out of range:  BP  Candace Fam RN

## 2017-07-17 RX ORDER — LISINOPRIL AND HYDROCHLOROTHIAZIDE 20; 25 MG/1; MG/1
1 TABLET ORAL DAILY
Qty: 90 TABLET | Refills: 0 | Status: SHIPPED | OUTPATIENT
Start: 2017-07-17 | End: 2017-10-05

## 2017-10-05 ENCOUNTER — OFFICE VISIT (OUTPATIENT)
Dept: FAMILY MEDICINE | Facility: CLINIC | Age: 52
End: 2017-10-05
Payer: COMMERCIAL

## 2017-10-05 VITALS
BODY MASS INDEX: 41.75 KG/M2 | HEART RATE: 111 BPM | WEIGHT: 315 LBS | SYSTOLIC BLOOD PRESSURE: 124 MMHG | DIASTOLIC BLOOD PRESSURE: 78 MMHG | HEIGHT: 73 IN | TEMPERATURE: 98 F | OXYGEN SATURATION: 97 %

## 2017-10-05 DIAGNOSIS — Z23 NEED FOR PROPHYLACTIC VACCINATION AND INOCULATION AGAINST INFLUENZA: ICD-10-CM

## 2017-10-05 DIAGNOSIS — Z11.59 NEED FOR HEPATITIS C SCREENING TEST: ICD-10-CM

## 2017-10-05 DIAGNOSIS — M17.12 PRIMARY OSTEOARTHRITIS OF LEFT KNEE: ICD-10-CM

## 2017-10-05 DIAGNOSIS — Z00.00 ROUTINE GENERAL MEDICAL EXAMINATION AT A HEALTH CARE FACILITY: Primary | ICD-10-CM

## 2017-10-05 DIAGNOSIS — I10 ESSENTIAL HYPERTENSION WITH GOAL BLOOD PRESSURE LESS THAN 140/90: ICD-10-CM

## 2017-10-05 DIAGNOSIS — Z13.6 CARDIOVASCULAR SCREENING; LDL GOAL LESS THAN 130: ICD-10-CM

## 2017-10-05 LAB
ALBUMIN SERPL-MCNC: 3.6 G/DL (ref 3.4–5)
ALP SERPL-CCNC: 89 U/L (ref 40–150)
ALT SERPL W P-5'-P-CCNC: 40 U/L (ref 0–70)
ANION GAP SERPL CALCULATED.3IONS-SCNC: 10 MMOL/L (ref 3–14)
AST SERPL W P-5'-P-CCNC: 21 U/L (ref 0–45)
BILIRUB SERPL-MCNC: 0.6 MG/DL (ref 0.2–1.3)
BUN SERPL-MCNC: 15 MG/DL (ref 7–30)
CALCIUM SERPL-MCNC: 8.8 MG/DL (ref 8.5–10.1)
CHLORIDE SERPL-SCNC: 107 MMOL/L (ref 94–109)
CHOLEST SERPL-MCNC: 123 MG/DL
CO2 SERPL-SCNC: 27 MMOL/L (ref 20–32)
CREAT SERPL-MCNC: 0.72 MG/DL (ref 0.66–1.25)
GFR SERPL CREATININE-BSD FRML MDRD: >90 ML/MIN/1.7M2
GLUCOSE SERPL-MCNC: 97 MG/DL (ref 70–99)
HCV AB SERPL QL IA: NONREACTIVE
HDLC SERPL-MCNC: 48 MG/DL
LDLC SERPL CALC-MCNC: 57 MG/DL
NONHDLC SERPL-MCNC: 75 MG/DL
POTASSIUM SERPL-SCNC: 4.2 MMOL/L (ref 3.4–5.3)
PROT SERPL-MCNC: 6.7 G/DL (ref 6.8–8.8)
SODIUM SERPL-SCNC: 144 MMOL/L (ref 133–144)
TRIGL SERPL-MCNC: 90 MG/DL

## 2017-10-05 PROCEDURE — 90471 IMMUNIZATION ADMIN: CPT | Performed by: FAMILY MEDICINE

## 2017-10-05 PROCEDURE — 99396 PREV VISIT EST AGE 40-64: CPT | Mod: 25 | Performed by: FAMILY MEDICINE

## 2017-10-05 PROCEDURE — 36415 COLL VENOUS BLD VENIPUNCTURE: CPT | Performed by: FAMILY MEDICINE

## 2017-10-05 PROCEDURE — 80061 LIPID PANEL: CPT | Performed by: FAMILY MEDICINE

## 2017-10-05 PROCEDURE — 80053 COMPREHEN METABOLIC PANEL: CPT | Performed by: FAMILY MEDICINE

## 2017-10-05 PROCEDURE — 86803 HEPATITIS C AB TEST: CPT | Performed by: FAMILY MEDICINE

## 2017-10-05 PROCEDURE — 90688 IIV4 VACCINE SPLT 0.5 ML IM: CPT | Performed by: FAMILY MEDICINE

## 2017-10-05 RX ORDER — LISINOPRIL AND HYDROCHLOROTHIAZIDE 20; 25 MG/1; MG/1
1 TABLET ORAL DAILY
Qty: 90 TABLET | Refills: 0 | Status: SHIPPED | OUTPATIENT
Start: 2017-10-05 | End: 2018-01-13

## 2017-10-05 RX ORDER — MELOXICAM 15 MG/1
15 TABLET ORAL DAILY
Qty: 90 TABLET | Refills: 3 | Status: SHIPPED | OUTPATIENT
Start: 2017-10-05 | End: 2018-10-17

## 2017-10-05 RX ORDER — CALCIUM CARBONATE 300MG(750)
400 TABLET,CHEWABLE ORAL
COMMUNITY

## 2017-10-05 NOTE — PROGRESS NOTES
SUBJECTIVE:   CC: Sharif Giraldo is an 52 year old male who presents for preventative health visit.     Physical   Annual:     Getting at least 3 servings of Calcium per day::  Yes    Bi-annual eye exam::  Yes    Dental care twice a year::  Yes    Sleep apnea or symptoms of sleep apnea::  Sleep apnea    Diet::  Regular (no restrictions)    Taking medications regularly::  Yes    Medication side effects::  Not applicable    Additional concerns today::  No                Today's PHQ-2 Score: PHQ-2 ( 1999 Pfizer) 10/24/2016   Q1: Little interest or pleasure in doing things 2   Q2: Feeling down, depressed or hopeless 2   PHQ-2 Score 4       Abuse: Current or Past(Physical, Sexual or Emotional)- No  Do you feel safe in your environment - Yes    Social History   Substance Use Topics     Smoking status: Current Every Day Smoker     Packs/day: 0.01     Years: 10.00     Types: Cigars     Smokeless tobacco: Never Used      Comment:  cigars 2/ day     Alcohol use 0.0 oz/week     0 Standard drinks or equivalent per week      Comment: 1-2     About once a week    Last PSA:   PSA   Date Value Ref Range Status   06/10/2016 1.10 0 - 4 ug/L Final       Reviewed orders with patient. Reviewed health maintenance and updated orders accordingly - Yes      Reviewed and updated as needed this visit by clinical staff         Reviewed and updated as needed this visit by Provider        Past Medical History:   Diagnosis Date     HTN (hypertension) 2012     Morbid obesity with BMI of 60.0-69.9, adult (H)      LUH (obstructive sleep apnea) 5/2013    Severe       Past Surgical History:   Procedure Laterality Date     C AFF EYE MUSCLE SURG PROC UNLISTED  1970,1974     C ANESTH,REPAIR LO ABD HERNIA NOS  2000,2001    umbilical area     COLONOSCOPY  9/20/2013    Procedure: COLONOSCOPY;  Colonoscopy;  Surgeon: Avery Morales MD;  Location:  GI     HC EXCISE EXCESS SKIN TISSUE,OTHER      Hx; staph complications of hernia surg     HC  KNEE SCOPE, DIAGNOSTIC  1999,2000    Arthroscopy, Knee     HC REMOVAL OF TONSILS,<13 Y/O  1973    Tonsils <10y.o.     HC REPAIR INITIAL INCISIONAL HERNIA; INCARCERATED OR STRANGULATED  09/25/2002    Mesh repair of incarcerated incisional hernia, recurrent.     HERNIA REPAIR, INCISIONAL  10/09/08    Ventral herniorrhaphy/mesh placement.  Excision left elbow mass.       ROS:  C: NEGATIVE for fever, chills, change in weight  I: NEGATIVE for worrisome rashes, moles or lesions  E: NEGATIVE for vision changes or irritation  ENT: NEGATIVE for ear, mouth and throat problems  R: NEGATIVE for significant cough or SOB  CV: NEGATIVE for chest pain, palpitations or peripheral edema  GI: NEGATIVE for nausea, abdominal pain, heartburn, or change in bowel habits   male: negative for dysuria, hematuria, decreased urinary stream, erectile dysfunction, urethral discharge  M: NEGATIVE for significant arthralgias or myalgia  N: NEGATIVE for weakness, dizziness or paresthesias  P: NEGATIVE for changes in mood or affect    OBJECTIVE:   There were no vitals taken for this visit.    EXAM:  GENERAL: healthy, alert and no distress  EYES: Eyes grossly normal to inspection, PERRL and conjunctivae and sclerae normal  HENT: ear canals and TM's normal, nose and mouth without ulcers or lesions  NECK: no adenopathy, no asymmetry, masses, or scars and thyroid normal to palpation  RESP: lungs clear to auscultation - no rales, rhonchi or wheezes  CV: regular rate and rhythm, normal S1 S2, no S3 or S4, no murmur, click or rub, no peripheral edema and peripheral pulses strong  ABDOMEN: soft, nontender, no hepatosplenomegaly, no masses and bowel sounds normal  MS: no gross musculoskeletal defects noted, no edema  SKIN: no suspicious lesions or rashes  NEURO: Normal strength and tone, mentation intact and speech normal  PSYCH: mentation appears normal, affect normal/bright     ASSESSMENT/PLAN:   1. Routine general medical examination at a [facility]  "facility  Generally feeling okay. Trouble losing weight    2. Essential hypertension with goal blood pressure less than 140/90  We'll notify him with lab results. Renew medications.  - lisinopril-hydrochlorothiazide (PRINZIDE/ZESTORETIC) 20-25 MG per tablet; Take 1 tablet by mouth daily .  Dispense: 90 tablet; Refill: 0  - Comprehensive metabolic panel (BMP + Alb, Alk Phos, ALT, AST, Total. Bili, TP)    3. Primary osteoarthritis of left knee  Seems to work well.  - meloxicam (MOBIC) 15 MG tablet; Take 1 tablet (15 mg) by mouth daily  Dispense: 90 tablet; Refill: 3    4. CARDIOVASCULAR SCREENING; LDL GOAL LESS THAN 130  Notify of lab results.  - Lipid Profile    5. Need for hepatitis C screening test    - Hepatitis C antibody    6. Need for prophylactic vaccination and inoculation against influenza    - FLU VACCINE, 3 YRS +, IM (QUADRIVALENT W/PRESERVATIVES/MULTI-DOSE) [58521]  - Vaccine Administration, Initial [00149]    COUNSELING:   Reviewed preventive health counseling, as reflected in patient instructions       Regular exercise       Healthy diet/nutrition       Vision screening         reports that he has been smoking Cigars.  He has a 0.10 pack-year smoking history. He has never used smokeless tobacco.      Estimated body mass index is 51.85 kg/(m^2) as calculated from the following:    Height as of 6/15/17: 6' 1\" (1.854 m).    Weight as of 6/15/17: 393 lb (178.3 kg).   Weight management plan: Discussed healthy diet and exercise guidelines and patient will follow up in 12 months in clinic to re-evaluate.    Counseling Resources:  ATP IV Guidelines  Pooled Cohorts Equation Calculator  FRAX Risk Assessment  ICSI Preventive Guidelines  Dietary Guidelines for Americans, 2010  USDA's MyPlate  ASA Prophylaxis  Lung CA Screening    Avery Zapata MD  [facility]  Injectable Influenza Immunization Documentation    1.  Is the person to be vaccinated sick today?   No    2. Does the person to be " vaccinated have an allergy to a component   of the vaccine?   No    3. Has the person to be vaccinated ever had a serious reaction   to influenza vaccine in the past?   No    4. Has the person to be vaccinated ever had Guillain-Barré syndrome?   No    Form completed by Edelmira DORSEY CMA

## 2017-10-05 NOTE — MR AVS SNAPSHOT
"              After Visit Summary   10/5/2017    Sharif Giraldo    MRN: [id number]           Patient Information     Date Of Birth          1965        Visit Information        Provider Department      10/5/2017 7:40 AM Avery Zapata MD [facility]        Today's Diagnoses     Routine general medical examination at a health care facility    -  1    Essential hypertension with goal blood pressure less than 140/90        Primary osteoarthritis of left knee        CARDIOVASCULAR SCREENING; LDL GOAL LESS THAN 130        Need for hepatitis C screening test        Need for prophylactic vaccination and inoculation against influenza           Follow-ups after your visit        Who to contact     If you have questions or need follow up information about today's clinic visit or your schedule please contact [facility] directly at [phone].  Normal or non-critical lab and imaging results will be communicated to you by [email or web address], letter or phone within 4 business days after the clinic has received the results. If you do not hear from us within 7 days, please contact the clinic through [email or web address] or phone. If you have a critical or abnormal lab result, we will notify you by phone as soon as possible.  Submit refill requests through [email or web address] or call your pharmacy and they will forward the refill request to us. Please allow 3 business days for your refill to be completed.          Additional Information About Your Visit        [email or web address] Information     [email or web address] lets you send messages to your doctor, view your test results, renew your prescriptions, schedule appointments and more. To sign up, go to [email or web address] . Click on \"Log in\" on the left side of the screen, which will take you to the Welcome page. Then click on \"Sign up Now\" on the right side of the page.     You will be asked to enter the access code listed below, as well as some personal information. Please follow the " "directions to create your username and password.     Your access code is: [email or web address]  Expires: 2017  9:16 AM     Your access code will  in 90 days. If you need help or a new code, please call your [place] clinic or [phone].        Care EveryWhere ID     This is your Care EveryWhere ID. This could be used by other organizations to access your [place] medical records  [id number]        Your Vitals Were     Pulse Temperature Height Pulse Oximetry BMI (Body Mass Index)       111 98  F (36.7  C) (Tympanic) 6' 1\" (1.854 m) 97% 50.29 kg/m2        Blood Pressure from Last 3 Encounters:   10/05/17 124/78   17 145/79   16 (!) 148/96    Weight from Last 3 Encounters:   10/05/17 (!) 381 lb 3.2 oz (172.9 kg)   06/15/17 (!) 393 lb (178.3 kg)   17 (!) 390 lb (176.9 kg)              We Performed the Following     Comprehensive metabolic panel (BMP + Alb, Alk Phos, ALT, AST, Total. Bili, TP)     FLU VACCINE, 3 YRS +, IM (QUADRIVALENT W/PRESERVATIVES/MULTI-DOSE) [63571]     Hepatitis C antibody     Lipid Profile     Vaccine Administration, Initial [89347]          Today's Medication Changes          These changes are accurate as of: 10/5/17  8:30 AM.  If you have any questions, ask your nurse or doctor.               These medicines have changed or have updated prescriptions.        Dose/Directions    lisinopril-hydrochlorothiazide 20-25 MG per tablet   Commonly known as:  PRINZIDE/ZESTORETIC   This may have changed:  additional instructions   Used for:  Essential hypertension with goal blood pressure less than 140/90   Changed by:  Avery Zapata MD        Dose:  1 tablet   Take 1 tablet by mouth daily .   Quantity:  90 tablet   Refills:  0       meloxicam 15 MG tablet   Commonly known as:  MOBIC   This may have changed:  See the new instructions.   Used for:  Primary osteoarthritis of left knee   Changed by:  Avery Zapata MD        Dose:  15 mg   Take 1 tablet (15 mg) by mouth daily "   Quantity:  90 tablet   Refills:  3            Where to get your medicines      These medications were sent to [facility], [place] - [address] [place] [facility] [address]     Phone:  [phone]     lisinopril-hydrochlorothiazide 20-25 MG per tablet    meloxicam 15 MG tablet                Primary Care Provider Office Phone # Fax #    Avery Zapata [phone]       [address]   [facility] [place] [address]        Equal Access to Services     [facility]: Hadii aad ku hadasho Soomaali, waaxda luqadaha, qaybta kaalmada adeegyada, waxay idiin hayaan adefredy kharamary gramajo . So [facility] [phone].    ATENCIÓN: Si habla español, tiene a clark disposición servicios gratuitos de asistencia lingüística. [facility] [phone].    We comply with applicable federal civil rights laws and [place] laws. We do not discriminate on the basis of race, color, national origin, age, disability, sex, sexual orientation, or gender identity.            Thank you!     Thank you for choosing [facility]  for your care. Our goal is always to provide you with excellent care. Hearing back from our patients is one way we can continue to improve our services. Please take a few minutes to complete the written survey that you may receive in the mail after your visit with us. Thank you!             Your Updated Medication List - Protect others around you: Learn how to safely use, store and throw away your medicines at www.disposemymeds.org.          This list is accurate as of: 10/5/17  8:30 AM.  Always use your most recent med list.                   Brand Name Dispense Instructions for use Diagnosis    calcium carb 1250 mg (500 mg [place])/vitamin D 200 units 500-200 MG-UNIT per tablet    OSCAL with D    1 tablet    Take 600mg daily        FLUoxetine 40 MG capsule    PROzac    30 capsule    TAKE 1 CAPSULE BY MOUTH DAILY    Major depressive  disorder, recurrent episode, mild (H)       GLUCOSAMINE 1500 COMPLEX Caps           lisinopril-hydrochlorothiazide 20-25 MG per tablet    PRINZIDE/ZESTORETIC    90 tablet    Take 1 tablet by mouth daily .    Essential hypertension with goal blood pressure less than 140/90       Magnesium 400 MG Tabs      Take 400 mg % by mouth 2 times daily        meloxicam 15 MG tablet    MOBIC    90 tablet    Take 1 tablet (15 mg) by mouth daily    Primary osteoarthritis of left knee       order for DME     1 Device    Dispensed 1 Dorsal (Anterior) Night Splint, Size L/XL, with [facility] agreement signed by patient. Angy Garner [facility], November 30, 2015    Tendonitis, Achilles, left, Primary osteoarthritis of left knee       tadalafil 5 MG tablet    CIALIS    30 tablet    Take 1 tablet (5 mg) by mouth daily Never use with nitroglycerin, terazosin or doxazosin.    Male impotence       testosterone cypionate 200 MG/ML injection    DEPOTESTOTERONE    1 mL    Inject 1 mL (200 mg) into the muscle every 28 days    Hypogonadism male

## 2017-10-12 NOTE — PROGRESS NOTES
Chemistry panel was normal including blood sugar. Cholesterol was 123. Hepatitis C test was negative

## 2018-01-04 ENCOUNTER — OFFICE VISIT (OUTPATIENT)
Dept: UROLOGY | Facility: OTHER | Age: 53
End: 2018-01-04
Payer: COMMERCIAL

## 2018-01-04 VITALS
SYSTOLIC BLOOD PRESSURE: 128 MMHG | HEIGHT: 73 IN | DIASTOLIC BLOOD PRESSURE: 60 MMHG | BODY MASS INDEX: 41.75 KG/M2 | WEIGHT: 315 LBS

## 2018-01-04 DIAGNOSIS — E29.1 HYPOGONADISM MALE: Primary | ICD-10-CM

## 2018-01-04 LAB — HGB BLD-MCNC: 13.8 G/DL (ref 13.3–17.7)

## 2018-01-04 PROCEDURE — 85018 HEMOGLOBIN: CPT | Performed by: UROLOGY

## 2018-01-04 PROCEDURE — 36415 COLL VENOUS BLD VENIPUNCTURE: CPT | Performed by: UROLOGY

## 2018-01-04 PROCEDURE — 99213 OFFICE O/P EST LOW 20 MIN: CPT | Performed by: UROLOGY

## 2018-01-04 RX ORDER — TESTOSTERONE CYPIONATE 200 MG/ML
200 INJECTION, SOLUTION INTRAMUSCULAR
Qty: 1 ML | Refills: 5 | Status: SHIPPED | OUTPATIENT
Start: 2018-01-04 | End: 2018-10-25

## 2018-01-04 NOTE — MR AVS SNAPSHOT
"              After Visit Summary   2018    Sharif Giraldo    MRN: [id number]           Patient Information     Date Of Birth          1965        Visit Information        Provider Department      2018 9:30 AM Tr Todd MD [facility]        Today's Diagnoses     Hypogonadism male    -  1       Follow-ups after your visit        Who to contact     If you have questions or need follow up information about today's clinic visit or your schedule please contact [facility] directly at [phone].  Normal or non-critical lab and imaging results will be communicated to you by MyChart, letter or phone within 4 business days after the clinic has received the results. If you do not hear from us within 7 days, please contact the clinic through Linekonghart or phone. If you have a critical or abnormal lab result, we will notify you by phone as soon as possible.  Submit refill requests through PredPol or call your pharmacy and they will forward the refill request to us. Please allow 3 business days for your refill to be completed.          Additional Information About Your Visit        MyChart Information     PredPol lets you send messages to your doctor, view your test results, renew your prescriptions, schedule appointments and more. To sign up, go to [email or web address] . Click on \"Log in\" on the left side of the screen, which will take you to the Welcome page. Then click on \"Sign up Now\" on the right side of the page.     You will be asked to enter the access code listed below, as well as some personal information. Please follow the directions to create your username and password.     Your access code is: [email or web address]  Expires: 2018  9:53 AM     Your access code will  in 90 days. If you need help or a new code, please call your [facility] or [phone].        Care EveryWhere ID     This is your Care EveryWhere ID. This could be used by other " "organizations to access your [place] medical records  [id number]        Your Vitals Were     Height BMI (Body Mass Index)                1.854 m (6' 1\") 50.4 kg/m2           Blood Pressure from Last 3 Encounters:   01/04/18 128/60   10/05/17 124/78   01/27/17 145/79    Weight from Last 3 Encounters:   01/04/18 (!) 173.3 kg (382 lb)   10/05/17 (!) 172.9 kg (381 lb 3.2 oz)   06/15/17 (!) 178.3 kg (393 lb)              We Performed the Following     Hemoglobin          Where to get your medicines      Some of these will need a paper prescription and others can be bought over the counter.  Ask your nurse if you have questions.     Bring a paper prescription for each of these medications     testosterone cypionate 200 MG/ML injection          Primary Care Provider Office Phone # Fax #    Avery Zapata [phone]       [place] [address] [place] [address]        Equal Access to Services     JOHANNY ACOSTA : Hadii arlette silva hadasho Soomaali, waaxda luqadaha, qaybta kaalmada adeegyada, brian gramajo . So [facility] [phone].    ATENCIÓN: Si habla español, tiene a clark disposición servicios gratuitos de asistencia lingüística. Llame al [phone].    We comply with applicable federal civil rights laws and [place] laws. We do not discriminate on the basis of race, color, national origin, age, disability, sex, sexual orientation, or gender identity.            Thank you!     Thank you for choosing [facility]  for your care. Our goal is always to provide you with excellent care. Hearing back from our patients is one way we can continue to improve our services. Please take a few minutes to complete the written survey that you may receive in the mail after your visit with us. Thank you!             Your Updated Medication List - Protect others around you: Learn how to safely use, store and throw away your medicines at " www.disposemymeds.org.          This list is accurate as of: 1/4/18  9:53 AM.  Always use your most recent med list.                   Brand Name Dispense Instructions for use Diagnosis    Calcium carb-Vitamin D 500 mg Solomon-200 units 500-200 MG-UNIT per tablet    OSCAL with D;Oyster Shell Calcium    1 tablet    Take 600mg daily        FLUoxetine 40 MG capsule    PROzac    30 capsule    TAKE 1 CAPSULE BY MOUTH DAILY    Major depressive disorder, recurrent episode, mild (H)       GLUCOSAMINE 1500 COMPLEX Caps           lisinopril-hydrochlorothiazide 20-25 MG per tablet    PRINZIDE/ZESTORETIC    90 tablet    Take 1 tablet by mouth daily .    Essential hypertension with goal blood pressure less than 140/90       Magnesium 400 MG Tabs      Take 400 mg % by mouth 2 times daily        meloxicam 15 MG tablet    MOBIC    90 tablet    Take 1 tablet (15 mg) by mouth daily    Primary osteoarthritis of left knee       order for DME     1 Device    Dispensed 1 Dorsal (Anterior) Night Splint, Size L/XL, with [facility] agreement signed by patient. Angy Garner CMA, November 30, 2015    Tendonitis, Achilles, left, Primary osteoarthritis of left knee       tadalafil 5 MG tablet    CIALIS    30 tablet    Take 1 tablet (5 mg) by mouth daily Never use with nitroglycerin, terazosin or doxazosin.    Male impotence       testosterone cypionate 200 MG/ML injection    DEPOTESTOTERONE    1 mL    Inject 1 mL (200 mg) into the muscle every 28 days    Hypogonadism male

## 2018-01-04 NOTE — NURSING NOTE
"Chief Complaint   Patient presents with     RECHECK       Initial /60 (BP Location: Left arm, Patient Position: Chair, Cuff Size: Adult Regular)  Ht 6' 1\" (1.854 m)  Wt (!) 382 lb (173.3 kg)  BMI 50.4 kg/m2 Estimated body mass index is 50.4 kg/(m^2) as calculated from the following:    Height as of this encounter: 6' 1\" (1.854 m).    Weight as of this encounter: 382 lb (173.3 kg).  Medication Reconciliation: complete       Kathy Hernandez CMA       "

## 2018-01-04 NOTE — PROGRESS NOTES
Chief Complaint   Patient presents with     RECHECK       Sharif Giraldo is a 52 year old male who presents today for follow up of   Chief Complaint   Patient presents with     RECHECK    f/u for hypogonadism.  He has not been seen for over one year.  He is receiving TRT once a month.  His labs are not yet done.    Current Outpatient Prescriptions   Medication Sig Dispense Refill     testosterone cypionate (DEPOTESTOTERONE) 200 MG/ML injection Inject 1 mL (200 mg) into the muscle every 28 days 1 mL 5     Magnesium 400 MG TABS Take 400 mg % by mouth 2 times daily       lisinopril-hydrochlorothiazide (PRINZIDE/ZESTORETIC) 20-25 MG per tablet Take 1 tablet by mouth daily . 90 tablet 0     meloxicam (MOBIC) 15 MG tablet Take 1 tablet (15 mg) by mouth daily 90 tablet 3     FLUoxetine (PROZAC) 40 MG capsule TAKE 1 CAPSULE BY MOUTH DAILY 30 capsule 1     order for DME Dispensed 1 Dorsal (Anterior) Night Splint, Size L/XL, with [facility] agreement signed by patient. Angy Garner [facility], November 30, 2015 1 Device 0     calcium carb 1250 mg, 500 mg Solomon,/vitamin D 200 units (OSCAL WITH D) 500-200 MG-UNIT per tablet Take 600mg daily 1 tablet      Glucosamine-Chondroit-Vit C-Mn (GLUCOSAMINE 1500 COMPLEX) CAPS        tadalafil (CIALIS) 5 MG tablet Take 1 tablet (5 mg) by mouth daily Never use with nitroglycerin, terazosin or doxazosin. 30 tablet 1     [DISCONTINUED] testosterone cypionate (DEPOTESTOTERONE) 200 MG/ML injection Inject 1 mL (200 mg) into the muscle every 28 days 1 mL 5     Allergies   Allergen Reactions     No Known Drug Allergies       Past Medical History:   Diagnosis Date     HTN (hypertension) 2012     Morbid obesity with BMI of 60.0-69.9, adult (H)      LUH (obstructive sleep apnea) 5/2013    Severe      Past Surgical History:   Procedure Laterality Date     C AFF EYE MUSCLE SURG PROC UNLISTED  1970,1974     C ANESTH,REPAIR LO ABD HERNIA NOS  2000,2001    umbilical area     COLONOSCOPY  9/20/2013     Procedure: COLONOSCOPY;  Colonoscopy;  Surgeon: Avery Morales MD;  Location: [facility]     HC EXCISE EXCESS SKIN TISSUE,OTHER      Hx; staph complications of hernia surg     HC KNEE SCOPE, DIAGNOSTIC  1999,2000    Arthroscopy, Knee     HC REMOVAL OF TONSILS,<11 Y/O  1973    Tonsils <10y.o.     HC REPAIR INITIAL INCISIONAL HERNIA; INCARCERATED OR STRANGULATED  09/25/2002    Mesh repair of incarcerated incisional hernia, recurrent.     HERNIA REPAIR, INCISIONAL  10/09/08    Ventral herniorrhaphy/mesh placement.  Excision left elbow mass.     Family History   Problem Relation Age of Onset     HEART DISEASE Paternal Grandfather      heart attack     CEREBROVASCULAR DISEASE Paternal Grandfather      Alzheimer Disease Paternal Grandfather      HEART DISEASE Maternal Grandmother      heart attack     DIABETES Maternal Grandmother      adult onset     Alzheimer Disease Father      snores     Alzheimer Disease Paternal Grandmother      Hypertension Mother      DIABETES Mother      History     Social History     Marital Status:      Spouse Name: Amparo     Number of Children: 0     Years of Education: 16     Occupational History     [employer]     Social History Main Topics     Smoking status: Current Every Day Smoker -- 0.01 packs/day for 10 years     Types: Cigars     Smokeless tobacco: Never Used      Comment:  cigars 2/ day     Alcohol Use: Yes      Comment: 1-2     Drug Use: No     Sexual Activity:     Partners: Female     Other Topics Concern      Service No     Blood Transfusions No     Caffeine Concern Yes     coffee/tea/pop: 3/d       Occupational Exposure No     Hobby Hazards Yes          Sleep Concern No     Stress Concern No     Weight Concern Yes     desire wt loss     Special Diet No     Back Care No     Exercise No     Bike Helmet No     Seat Belt Yes     Social History Narrative       REVIEW OF SYSTEMS  =================  C: NEGATIVE for fever, chills, change in weight  I:  "NEGATIVE for worrisome rashes, moles or lesions  E/M: NEGATIVE for ear, mouth and throat problems  R: NEGATIVE for significant cough or SHORTNESS OF BREATH,   CV: NEGATIVE for chest pain, palpitations or peripheral edema  GI: NEGATIVE for nausea, abdominal pain, heartburn, or change in bowel habits  NEURO: NEGATIVE any motor/sensory changes  PSYCH: NEGATIVE for recent mood disorder    Physical Exam:  /60 (BP Location: Left arm, Patient Position: Chair, Cuff Size: Adult Regular)  Ht 1.854 m (6' 1\")  Wt (!) 173.3 kg (382 lb)  BMI 50.4 kg/m2   Patient is pleasant, in no acute distress, good general condition.  Lung: no evidence of respiratory distress    Abdomen: Soft, nondistended, non tender. No masses. No rebound or guarding.   Exam: penis no d/c.  Prostate apex only smooth no nodule.  Skin: Warm and dry.  No redness.  Psych: normal mood and affect  Neuro: alert and oriented    Assessment/Plan:   (E29.1) Hypogonadism male  (primary encounter diagnosis)  Comment:  He has thalessemia   Plan: testosterone cypionate (DEPOTESTOTERONE         CYPIONATE) 200 MG/ML injection, PSA, tumor         Marker in one year.  Encourage weight loss/exercising.                                  "

## 2018-01-13 DIAGNOSIS — I10 ESSENTIAL HYPERTENSION WITH GOAL BLOOD PRESSURE LESS THAN 140/90: ICD-10-CM

## 2018-01-15 NOTE — TELEPHONE ENCOUNTER
"Last Written Prescription Date: 10/05/2017  Last Fill Quantity: 90,  # refills: 0   Last Office Visit with [facility] or [facility] prescribing provider:  10/05/2017   Future Office Visit:       Requested Prescriptions   Pending Prescriptions Disp Refills     lisinopril-hydrochlorothiazide (PRINZIDE/ZESTORETIC) 20-25 MG per tablet [Pharmacy Med Name: LISINOPRIL-HYDROCHLOROTH 20-25 TABS] 90 tablet 0     Sig: TAKE ONE TABLET BY MOUTH EVERY DAY    Diuretics (Including Combos) Protocol Passed    1/13/2018 12:16 PM       Passed - Blood pressure under 140/90    BP Readings from Last 3 Encounters:   01/04/18 128/60   10/05/17 124/78   01/27/17 145/79                Passed - Recent or future visit with authorizing provider's specialty    Patient had office visit in the last year or has a visit in the next 30 days with authorizing provider.  See \"Patient Info\" tab in inbasket, or \"Choose Columns\" in Meds & Orders section of the refill encounter.              Passed - Patient is age 18 or older       Passed - Normal serum creatinine on file in past 12 months    Recent Labs   Lab Test  10/05/17   0823   CR  0.72             Passed - Normal serum potassium on file in past 12 months    Recent Labs   Lab Test  10/05/17   0823   POTASSIUM  4.2                   Passed - Normal serum sodium on file in past 12 months    Recent Labs   Lab Test  10/05/17   0823   NA  144                  "

## 2018-01-17 RX ORDER — LISINOPRIL AND HYDROCHLOROTHIAZIDE 20; 25 MG/1; MG/1
TABLET ORAL
Qty: 90 TABLET | Refills: 2 | Status: SHIPPED | OUTPATIENT
Start: 2018-01-17 | End: 2018-10-17

## 2018-01-17 NOTE — TELEPHONE ENCOUNTER
Prescription approved per [facility] Refill Protocol..............FARIDEH Cordova

## 2018-02-02 ENCOUNTER — TELEPHONE (OUTPATIENT)
Dept: FAMILY MEDICINE | Facility: CLINIC | Age: 53
End: 2018-02-02
Payer: COMMERCIAL

## 2018-02-02 NOTE — LETTER
2/15/2018    INSURER: Payor: SpecifiedBy / Plan: [employer] [employer] / Product Type: HMO /   ATTN: [facility] tess  Re: Prior Authorization Request  Patient: Sharif Giraldo  Policy ID#:  [id number]  : 1965      To Whom it May Concern:    I am writing to formally request a prior authorization of coverage for my patient,  Sharif Girlado, for treatment using Testosterone 200 mg IM q month.  I am requesting authorization for applicable provider professional and facility services associated with this therapy.    The therapy involves Testosterone replacement for testicular hypofunction. 200 mg IM q month  Male hypogonadism  - Primary E29.1        Notes             The benefits of the therapy include less fatigue, increase libido, and improve quality of life    I have treated Sharif Giraldo since 2013 and I have determined that it is medically appropriate for  this patient to receive be treated with testosterone  for the reason(s) stated below:      Patient has been treated with testosterone replacement since  with good results       Prior to treatment patient's testosterone levels were:   2013 093   2013 116       Patient has had many benefits such as improved energy and libido.    I have included medical records pertaining to the patient s medical history, current condition and treatment plan.  In addition, the following billing codes will be used for therapy and follow-up:   Male hypogonadism  - Primary E29.1        Notes        I firmly believe that this therapy is clinically appropriate and that Sharif Rodriguez would benefit from improved quality of life if allowed the opportunity to continue this treatment.  Please contact me at Dept: [phone] if you require additional information to ensure the prompt approval for coverage.    Please send your written decision to me at this address:  [address]  [place]  [address]  [phone]  Dept: [phone]        Sincerely,      Tr Todd MD

## 2018-02-02 NOTE — TELEPHONE ENCOUNTER
PA needed on:testostone    Insurance: advance [employer]cs   Ins. Phone [phone]  Patient ID: [id number]  PCN: [id number]   [id number]    Please let us know when PA is granted/denied. Thank You   Pt would like a call from  if Prior auth is denied or if  decides to not do prior auth       [facility]

## 2018-02-13 NOTE — TELEPHONE ENCOUNTER
PA denied.  Reason given:      Patient does not meet PA criteria. Primary or hypogonadotropic hypogonadism.   Patient notified:  Letter sent by insurance company and letter forwarded to [employer].  Will route to PA pool for appeal.   Zeynep Chaidez RN

## 2018-02-13 NOTE — TELEPHONE ENCOUNTER
Central Prior Authorization Team   Phone: [phone]    Unfortunately since the PA team did not do the original request we are unable to do the appeal.  We only do appeals that the original claim was submitted by our department.

## 2018-02-16 ENCOUNTER — OFFICE VISIT (OUTPATIENT)
Dept: SLEEP MEDICINE | Facility: CLINIC | Age: 53
End: 2018-02-16
Payer: COMMERCIAL

## 2018-02-16 VITALS
DIASTOLIC BLOOD PRESSURE: 90 MMHG | OXYGEN SATURATION: 95 % | HEIGHT: 73 IN | SYSTOLIC BLOOD PRESSURE: 138 MMHG | BODY MASS INDEX: 41.75 KG/M2 | HEART RATE: 105 BPM | RESPIRATION RATE: 18 BRPM | WEIGHT: 315 LBS

## 2018-02-16 DIAGNOSIS — G47.33 OSA (OBSTRUCTIVE SLEEP APNEA): Primary | ICD-10-CM

## 2018-02-16 PROCEDURE — 99213 OFFICE O/P EST LOW 20 MIN: CPT | Performed by: PHYSICIAN ASSISTANT

## 2018-02-16 NOTE — MR AVS SNAPSHOT
"              After Visit Summary   2/16/2018    Sharif Giraldo    MRN: [id number]           Patient Information     Date Of Birth          1965        Visit Information        Provider Department      2/16/2018 2:30 PM Raulito Greenberg PA-C [facility]        Today's Diagnoses     LUH (obstructive sleep apnea)    -  1       Follow-ups after your visit        Follow-up notes from your care team     Return in about 1 year (around 2/16/2019).      Your next 10 appointments already scheduled     Feb 21, 2018  2:40 PM CST   [facility] with Avery [facility]ata MD   [facility]    [address] [place] [id number] [phone]           Bring a current list of meds and any records pertaining to this visit. For Physicals, please bring immunization records and any forms needing to be filled out. Please arrive 10 minutes early to complete paperwork.              Who to contact     If you have questions or need follow up information about today's clinic visit or your schedule please contact [facility] directly at [phone].  Normal or non-critical lab and imaging results will be communicated to you by Chirpmehart, letter or phone within 4 business days after the clinic has received the results. If you do not hear from us within 7 days, please contact the clinic through [employer] or phone. If you have a critical or abnormal lab result, we will notify you by phone as soon as possible.  Submit refill requests through [employer] or call your pharmacy and they will forward the refill request to us. Please allow 3 business days for your refill to be completed.          Additional Information About Your Visit        ChirpmeharWalldress Information     [employer] lets you send messages to your doctor, view your test results, renew your prescriptions, schedule appointments and more. To sign up, go to [email or web address] . Click on \"Log " "in\" on the left side of the screen, which will take you to the Welcome page. Then click on \"Sign up Now\" on the right side of the page.     You will be asked to enter the access code listed below, as well as some personal information. Please follow the directions to create your username and password.     Your access code is: [email or web address]  Expires: 2018  9:53 AM     Your access code will  in 90 days. If you need help or a new code, please call your [facility] clinic or [phone].        Care EveryWhere ID     This is your Care EveryWhere ID. This could be used by other organizations to access your [facility] medical records  [id number]        Your Vitals Were     Pulse Respirations Height Pulse Oximetry BMI (Body Mass Index)       105 18 1.854 m (6' 1\") 95% 52.44 kg/m2        Blood Pressure from Last 3 Encounters:   18 138/90   18 128/60   10/05/17 124/78    Weight from Last 3 Encounters:   18 (!) 180.3 kg (397 lb 8 oz)   18 (!) 173.3 kg (382 lb)   10/05/17 (!) 172.9 kg (381 lb 3.2 oz)              We Performed the Following     Comprehensive DME        Primary Care Provider Office Phone # Fax #    Avery Zapata [phone]       [facility] [address] [place] [id number]        Equal Access to Services     JULIA [facility] Center[facility]ELINA AH: Hadii aad ku hadasho Soomaali, waaxda luqadaha, qaybta kaalmada adeegyada, brian gramajo . So [facility] [place] [facility] [phone].    ATENCIÓN: Si habla español, tiene a clark disposición servicios gratuitos de asistencia lingüística. Llame al [phone].    We comply with applicable federal civil rights laws and [place] laws. We do not discriminate on the basis of race, color, national origin, age, disability, sex, sexual orientation, or gender identity.            Thank you!     Thank you for choosing [facility]  for your care. Our goal is always to provide you with excellent care. " Hearing back from our patients is one way we can continue to improve our services. Please take a few minutes to complete the written survey that you may receive in the mail after your visit with us. Thank you!             Your Updated Medication List - Protect others around you: Learn how to safely use, store and throw away your medicines at www.disposemymeds.org.          This list is accurate as of 2/16/18  3:15 PM.  Always use your most recent med list.                   Brand Name Dispense Instructions for use Diagnosis    Calcium carb-Vitamin D 500 mg [place]-200 units 500-200 MG-UNIT per tablet    OSCAL with D;Oyster Shell Calcium    1 tablet    Take 600mg daily        FLUoxetine 40 MG capsule    PROzac    30 capsule    TAKE 1 CAPSULE BY MOUTH DAILY    Major depressive disorder, recurrent episode, mild (H)       GLUCOSAMINE 1500 COMPLEX Caps           lisinopril-hydrochlorothiazide 20-25 MG per tablet    PRINZIDE/ZESTORETIC    90 tablet    TAKE ONE TABLET BY MOUTH EVERY DAY    Essential hypertension with goal blood pressure less than 140/90       Magnesium 400 MG Tabs      Take 400 mg % by mouth 2 times daily        meloxicam 15 MG tablet    MOBIC    90 tablet    Take 1 tablet (15 mg) by mouth daily    Primary osteoarthritis of left knee       order for DME     1 Device    Dispensed 1 Dorsal (Anterior) Night Splint, Size L/XL, with [facility] agreement signed by patient. Angy Garner [facility], November 30, 2015    Tendonitis, Achilles, left, Primary osteoarthritis of left knee       tadalafil 5 MG tablet    CIALIS    30 tablet    Take 1 tablet (5 mg) by mouth daily Never use with nitroglycerin, terazosin or doxazosin.    Male impotence       testosterone cypionate 200 MG/ML injection    DEPOTESTOTERONE    1 mL    Inject 1 mL (200 mg) into the muscle every 28 days    Hypogonadism male

## 2018-02-16 NOTE — PROGRESS NOTES
Obstructive Sleep Apnea- PAP Follow-Up Visit:    Chief Complaint   Patient presents with     CPAP Follow Up     Pt needs a renewal for DME supplies       Sharif Giraldo comes in today for follow-up of their severe sleep apnea, managed with BIPAP 25/10. He needs updated prescription for supplies. Everything is going well with the BIPAP.     No specialty comments available.    Overall, he rates the experience with PAP as 9 (0 poor, 10 great). The mask is comfortable.  =The mask is not leaking .  He is not snoring with the mask on. He is not having gasp arousals.  He is not having significant oral/nasal dryness. The pressure is comfortable.     His PAP interface is Nasal Mask.     Patient is using PAP therapy 6-7 hours per night. The patient is usually getting 6-7 hours of sleep per night.    He does feel rested in the morning.    [place] Sleepiness Scale: 9/24        BIPAP 25 - 10 cmH2O 30 day usage data:    100% of days with > 4 hours of use. 0/30 days with no use. Average use 7 hours 8 minutes per day.   Average time in large leak 8 sec   IPAP 90% of time13.4  EPAP 90% of time 10.3  AHI 0.4 events per hour.          Past medical/surgical history, family history, social history, medications and allergies were reviewed.      Problem List:  Patient Active Problem List    Diagnosis Date Noted     Major depressive disorder, recurrent episode, mild (H) 09/23/2016     Priority: Medium     Thalassemia carrier 06/11/2013     Priority: Medium     Hypogonadism male 05/30/2013     Priority: Medium     LUH (obstructive sleep apnea)-very severe () 05/14/2013     Priority: Medium     Indications for Polysomnography 5/8/2013: The patient is a 47 y year old Male who is 6' and weighs 448.1 lbs.  His BMI equals 61.3.  The patients [place] sleepiness scale was 21.0 and neck size was 21.5.  A diagnostic polysomnogram was performed to evaluate for excessive sleepiness, snoring, and possible LUH. After 121.0 minutes of sleep  time the patient exhibited sufficient respiratory events qualifying him for a CPAP trial which was then initiated.      Polysomnogram Data:  A full night polysomnogram was performed recording the standard physiologic parameters including EEG, EOG, EMG, EKG, nasal and oral airflow.  Respiratory parameters of chest and abdominal movements are recorded with respiratory inductance plethysmography.  Oxygen saturation was recorded by pulse oximetry.      Diagnostic PSG  Sleep Architecture:  The total recording time of the diagnostic portion of the study was 134.7 minutes.  The total sleep time was 121.0 minutes.  During the diagnostic portion of the study the sleep latency was 0.2 minutes.  REM latency was 116.0 minutes.  Sleep Efficiency was 89.8%.  Wake after sleep onset was 13.0.   The patient spent 21.5% of total sleep time in Stage N1, 69.8% in Stage N2, 2.9% in Stages N3 and 5.8% in REM.         Respiration:     Sustained Sleep Associated Hypoventilation - was present with a baseline TCM of 45 and a maximum TCM of 54. PaCO2 was 44.    Sleep Associated Hypoxemia - was present and was very severe.  Baseline oxygen saturation was 86.8%.  The lowest oxygen saturation was 32.0%.  Snoring was reported as moderately loud to loud.     Events - During the diagnostic portion of the study, the polysomnogram revealed a presence of 167 obstructive, 1 central, and 1 mixed apneas resulting in an Apnea index of 83.8 events per hour.  There were 38 hypopneas resulting in a Hypopnea index of 18.8 events per hour.  The combined Apnea/Hypopnea Index was 102.6 events per hour.  The REM AHI was 60.0.  The RERA index was 0 per hour.   The RDI was 103.    - 102.6     Treatment PSG  Sleep Architecture:  At 12:24 am the patient was placed on CPAP and then bi-level treatment was titrated.  The total recording time of the treatment portion of the study was 366.2 minutes.  The total sleep time was 350.5 minutes.  During the treatment portion of  the study the sleep latency was 0 minutes.  REM latency was 66.0 minutes.  Sleep Efficiency was 95.7%.  Sleep Maintenance Efficiency was 96.0%.  Total wake time was 16.0 minutes for a total wake percentage of 3.8%. the patient spent 6.3% of total sleep time in Stage N1, 20.1% in Stage N2, 40.7% in Stages N3 and N, and 33.0% in REM.       Respiration:  CPAP was titrated from 5 cmH2O to 1 5cmH20 but oxygen desaturations persisted into the 70%s so bi-level PAP was initiated.  Bi-level was titrated to 20/08nmD95 in response to hypopneas and low O2 saturations.  The final pressure was 20.0/14.0 with an AHI of 11.0 all of which were central apneas. Supine REM noted at 18/13-12 cmH2O. AHI was 11 but all were centrals. TCM returned to improved levels (38) with Bi-level.     Movement Activity:      Limb - During the diagnostic portion of the study, there were no limb movements recorded. During the treatment portion of the study, there were 376 limb movements recorded.  Of this total, 367 were classified as PLMs.  Of the PLMs, none were associated with arousals.  The Limb Movement index was 64.4 per hour while the PLM index was 62.8 per hour.     Behavior - none    Bruxism - none    Seizure - none      CARDIAC SUMMARY:   During the diagnostic portion of the study, the average pulse rate was 84.7 bpm.  The minimum pulse rate was 35.0 bpm while the maximum pulse rate was 124.3 bpm.    During the treatment portion of the study, the average pulse rate was 80.8 bpm.  The minimum pulse rate was 53.8 bpm while the maximum pulse rate was 125.1 bpm.       Assessment:    Very severe LUH () with adequate positive airway pressure titration.    Sleep associated hypoventilation secondary to obesity (OHS)  Recommendations:    Bi-level PAP pressure 18/13 cmH2O with clinical follow-up within 3 - 4 weeks including compliance measures. If pressure too high, or if AHI unacceptably high, proceed to full night CPAP/Bi-level PAP titration  "study.     Advise regarding the risks of drowsy driving    Suggest optimizing sleep hygiene and avoiding sleep deprivation    Weight management    Dopaminergic therapy should be used for management of restless leg syndrome (RLS) and not based on the presence of periodic limb movements alone.       Sleep related hypoventilation/hypoxemia in other disease 05/14/2013     Priority: Medium     Problem list name updated by automated process. Provider to review       Nocturia 04/18/2013     Priority: Medium     Anemia 04/18/2013     Priority: Medium     Hypertension goal BP (blood pressure) < 140/90 10/18/2012     Priority: Medium     CARDIOVASCULAR SCREENING; LDL GOAL LESS THAN 130 10/31/2010     Priority: Medium     Morbid obesity (H) 05/07/2008     Priority: Medium     Umbilical hernia 08/13/2002     Priority: Medium     Problem list name updated by automated process. Provider to review          /90  Pulse 105  Resp 18  Ht 1.854 m (6' 1\")  Wt (!) 180.3 kg (397 lb 8 oz)  SpO2 95%  BMI 52.44 kg/m2    [place]=9    Impression/Plan:    Severe Sleep apnea. He is Tolerating BIPAP well. Daytime symptoms are stable..   New prescription for supplies done, seen by DME.     Sharif Giraldo will follow up in about 1 year(s).     Fifteen minutes spent with patient, all of which were spent face-to-face counseling, consulting, coordinating plan of care.            CC:  Avery Zapata,     "

## 2018-02-16 NOTE — NURSING NOTE
"Chief Complaint   Patient presents with     CPAP Follow Up     Pt needs a renewal for DME supplies       Initial /90  Pulse 105  Resp 18  Ht 1.854 m (6' 1\")  Wt (!) 180.3 kg (397 lb 8 oz)  SpO2 95%  BMI 52.44 kg/m2 Estimated body mass index is 52.44 kg/(m^2) as calculated from the following:    Height as of this encounter: 1.854 m (6' 1\").    Weight as of this encounter: 180.3 kg (397 lb 8 oz).  Medication Reconciliation: complete     [place]=9    Kathy Hernandez CMA       "

## 2018-02-21 ENCOUNTER — OFFICE VISIT (OUTPATIENT)
Dept: FAMILY MEDICINE | Facility: CLINIC | Age: 53
End: 2018-02-21
Payer: COMMERCIAL

## 2018-02-21 VITALS
SYSTOLIC BLOOD PRESSURE: 110 MMHG | DIASTOLIC BLOOD PRESSURE: 76 MMHG | HEART RATE: 119 BPM | HEIGHT: 73 IN | WEIGHT: 315 LBS | TEMPERATURE: 98.2 F | OXYGEN SATURATION: 95 % | BODY MASS INDEX: 41.75 KG/M2

## 2018-02-21 DIAGNOSIS — R20.2 TINGLING OF LEFT UPPER EXTREMITY: Primary | ICD-10-CM

## 2018-02-21 PROCEDURE — 99213 OFFICE O/P EST LOW 20 MIN: CPT | Performed by: FAMILY MEDICINE

## 2018-02-21 RX ORDER — ERGOCALCIFEROL (VITAMIN D2) 10 MCG
400 TABLET ORAL DAILY
COMMUNITY

## 2018-02-21 NOTE — NURSING NOTE
"Chief Complaint   Patient presents with     Musculoskeletal Problem     Left shoulder pain radiating down arm        Initial /76 (BP Location: Left arm, Patient Position: Chair, Cuff Size: Adult Large)  Pulse 119  Temp 98.2  F (36.8  C) (Temporal)  Ht 6' 1\" (1.854 m)  Wt (!) 395 lb 9.6 oz (179.4 kg)  SpO2 95%  BMI 52.19 kg/m2 Estimated body mass index is 52.19 kg/(m^2) as calculated from the following:    Height as of this encounter: 6' 1\" (1.854 m).    Weight as of this encounter: 395 lb 9.6 oz (179.4 kg).  Medication Reconciliation: complete     "

## 2018-02-21 NOTE — MR AVS SNAPSHOT
"              After Visit Summary   2018    Sharif Giraldo    MRN: [id number]           Patient Information     Date Of Birth          1965        Visit Information        Provider Department      2018 2:40 PM Avery Zapata MD [facility]        Today's Diagnoses     Tingling of left upper extremity    -  1       Follow-ups after your visit        Future tests that were ordered for you today     Open Future Orders        Priority Expected Expires Ordered    EMG Routine  2019            Who to contact     If you have questions or need follow up information about today's clinic visit or your schedule please contact [facility] directly at [phone].  Normal or non-critical lab and imaging results will be communicated to you by MyChart, letter or phone within 4 business days after the clinic has received the results. If you do not hear from us within 7 days, please contact the clinic through MyChart or phone. If you have a critical or abnormal lab result, we will notify you by phone as soon as possible.  Submit refill requests through [employer] or call your pharmacy and they will forward the refill request to us. Please allow 3 business days for your refill to be completed.          Additional Information About Your Visit        MyChart Information     [employer] lets you send messages to your doctor, view your test results, renew your prescriptions, schedule appointments and more. To sign up, go to [email or web address] . Click on \"Log in\" on the left side of the screen, which will take you to the Welcome page. Then click on \"Sign up Now\" on the right side of the page.     You will be asked to enter the access code listed below, as well as some personal information. Please follow the directions to create your username and password.     Your access code is: [email or web address]  Expires: 2018  9:53 AM     Your access code will  in 90 days. If you " "need help or a new code, please call your [place] clinic or [phone].        Care EveryWhere ID     This is your Care EveryWhere ID. This could be used by other organizations to access your [place] medical records  [id number]        Your Vitals Were     Pulse Temperature Height Pulse Oximetry BMI (Body Mass Index)       119 98.2  F (36.8  C) (Temporal) 6' 1\" (1.854 m) 95% 52.19 kg/m2        Blood Pressure from Last 3 Encounters:   02/21/18 110/76   02/16/18 138/90   01/04/18 128/60    Weight from Last 3 Encounters:   02/21/18 (!) 395 lb 9.6 oz (179.4 kg)   02/16/18 (!) 397 lb 8 oz (180.3 kg)   01/04/18 (!) 382 lb (173.3 kg)               Primary Care Provider Office Phone # Fax #    Avery Zapata [phone]       [facility] [address] [place] [address]        Equal Access to Services     [facility]: Hadii aad ku hadasho Soomaali, waaxda luqadaha, qaybta kaalmada adeegyademetrius, brian gramajo . So [facility] [phone].    ATENCIÓN: Si habla español, tiene a clark disposición servicios gratuitos de asistencia lingüística. Natacha al [phone].    We comply with applicable federal civil rights laws and [place] laws. We do not discriminate on the basis of race, color, national origin, age, disability, sex, sexual orientation, or gender identity.            Thank you!     Thank you for choosing [facility]  for your care. Our goal is always to provide you with excellent care. Hearing back from our patients is one way we can continue to improve our services. Please take a few minutes to complete the written survey that you may receive in the mail after your visit with us. Thank you!             Your Updated Medication List - Protect others around you: Learn how to safely use, store and throw away your medicines at www.disposemymeds.org.          This list is accurate as of 2/21/18  3:02 PM.  Always use your most recent med list. "                   Brand Name Dispense Instructions for use Diagnosis    Calcium carb-Vitamin D 500 mg [place]-200 units 500-200 MG-UNIT per tablet    OSCAL with D;Oyster Shell Calcium    1 tablet    Take 600mg daily        Calcium-Magnesium 300-300 MG Tabs      Take 300 mg by mouth daily        FLUoxetine 40 MG capsule    PROzac    30 capsule    TAKE 1 CAPSULE BY MOUTH DAILY    Major depressive disorder, recurrent episode, mild (H)       GLUCOSAMINE 1500 COMPLEX Caps           lisinopril-hydrochlorothiazide 20-25 MG per tablet    PRINZIDE/ZESTORETIC    90 tablet    TAKE ONE TABLET BY MOUTH EVERY DAY    Essential hypertension with goal blood pressure less than 140/90       Magnesium 400 MG Tabs      Take 400 mg % by mouth 2 times daily        meloxicam 15 MG tablet    MOBIC    90 tablet    Take 1 tablet (15 mg) by mouth daily    Primary osteoarthritis of left knee       order for DME     1 Device    Dispensed 1 Dorsal (Anterior) Night Splint, Size L/XL, with [facility] agreement signed by patient. Angy Garner [facility], November 30, 2015    Tendonitis, Achilles, left, Primary osteoarthritis of left knee       raltegravir 400 MG tablet    ISENTRESS     Take 400 mg by mouth 2 times daily        tadalafil 5 MG tablet    CIALIS    30 tablet    Take 1 tablet (5 mg) by mouth daily Never use with nitroglycerin, terazosin or doxazosin.    Male impotence       testosterone cypionate 200 MG/ML injection    DEPOTESTOTERONE    1 mL    Inject 1 mL (200 mg) into the muscle every 28 days    Hypogonadism male       Vitamin D (Cholecalciferol) 400 UNITS Tabs      Take 400 Units by mouth daily

## 2018-02-21 NOTE — PROGRESS NOTES
SUBJECTIVE:   Sharif Giraldo is a 52 year old male who presents to clinic today for the following health issues:    Patient was working out and thought he may have pulled a muscle. The pain has gotten worse over the last month. States that there is now tingling in his left hand.       Joint Pain-Left shoulder radiating down arm     Onset: 1 month     Description:   Location: left shoulder  Character: Sharp and Dull ache    Intensity: moderate    Progression of Symptoms: worse    Accompanying Signs & Symptoms:  Other symptoms: radiation of pain to the rest of his arm.     History:   Previous similar pain: no       Precipitating factors:   Trauma or overuse: YES- Patient was working out and thought he may have pulled a muscle. The pain has gotten worse over the last month. States that there is now tingling in his left hand.     Alleviating factors:  Improved by: sleep     Therapies Tried and outcome: nothing             Problem list and histories reviewed & adjusted, as indicated.  Additional history: as documented        Reviewed and updated as needed this visit by clinical staff       Reviewed and updated as needed this visit by Provider        SUBJECTIVE:  Sharif  is a 52 year old male who presents for: Left shoulder discomfort but also radiating down his arm to his elbow and no tingling in his hand and fingers.  It is kind of all day long.  He wakes up in the morning it is not really there he does not recall any distinct injury but is gotten worse over the last month denies any shortness of breath or chest pain dyspnea on exertion or any increase in discomfort on exertion.    Past Medical History:   Diagnosis Date     HTN (hypertension) 2012     Morbid obesity with BMI of 60.0-69.9, adult (H)      LUH (obstructive sleep apnea) 5/2013    Severe      Past Surgical History:   Procedure Laterality Date     C AFF EYE MUSCLE SURG PROC UNLISTED  1970,1974     C ANESTH,REPAIR LO ABD HERNIA NOS  2000,2001     umbilical area     COLONOSCOPY  9/20/2013    Procedure: COLONOSCOPY;  Colonoscopy;  Surgeon: Avery Morales MD;  Location: [facility]     HC EXCISE EXCESS SKIN TISSUE,OTHER      Hx; staph complications of hernia surg     HC KNEE SCOPE, DIAGNOSTIC  1999,2000    Arthroscopy, Knee     HC REMOVAL OF TONSILS,<13 Y/O  1973    Tonsils <10y.o.     HC REPAIR INITIAL INCISIONAL HERNIA; INCARCERATED OR STRANGULATED  09/25/2002    Mesh repair of incarcerated incisional hernia, recurrent.     HERNIA REPAIR, INCISIONAL  10/09/08    Ventral herniorrhaphy/mesh placement.  Excision left elbow mass.     Social History   Substance Use Topics     Smoking status: Current Every Day Smoker     Packs/day: 0.01     Years: 10.00     Types: Cigars     Smokeless tobacco: Never Used      Comment:  cigars 2/ day     Alcohol use 0.0 oz/week     0 Standard drinks or equivalent per week      Comment: 1 a week      Current Outpatient Prescriptions   Medication Sig Dispense Refill     Calcium-Magnesium 300-300 MG TABS Take 300 mg by mouth daily       Vitamin D, Cholecalciferol, 400 UNITS TABS Take 400 Units by mouth daily       lisinopril-hydrochlorothiazide (PRINZIDE/ZESTORETIC) 20-25 MG per tablet TAKE ONE TABLET BY MOUTH EVERY DAY 90 tablet 2     testosterone cypionate (DEPOTESTOTERONE) 200 MG/ML injection Inject 1 mL (200 mg) into the muscle every 28 days 1 mL 5     meloxicam (MOBIC) 15 MG tablet Take 1 tablet (15 mg) by mouth daily 90 tablet 3     order for DME Dispensed 1 Dorsal (Anterior) Night Splint, Size L/XL, with [facility] agreement signed by patient. Angy Garner [facility], November 30, 2015 1 Device 0     raltegravir (ISENTRESS) 400 MG tablet Take 400 mg by mouth 2 times daily       Magnesium 400 MG TABS Take 400 mg % by mouth 2 times daily       FLUoxetine (PROZAC) 40 MG capsule TAKE 1 CAPSULE BY MOUTH DAILY (Patient not taking: Reported on 2/21/2018) 30 capsule 1     calcium carb 1250 mg, 500 mg [place],/vitamin D 200 units (OSCAL WITH D) 500-200  "MG-UNIT per tablet Take 600mg daily 1 tablet      Glucosamine-Chondroit-Vit C-Mn (GLUCOSAMINE 1500 COMPLEX) CAPS        tadalafil (CIALIS) 5 MG tablet Take 1 tablet (5 mg) by mouth daily Never use with nitroglycerin, terazosin or doxazosin. (Patient not taking: Reported on 2/21/2018) 30 tablet 1       REVIEW OF SYSTEMS:   5 point ROS negative except as noted above in HPI, including Gen., Resp, CV, GI &  system review.     OBJECTIVE:  Vitals: /76 (BP Location: Left arm, Patient Position: Chair, Cuff Size: Adult Large)  Pulse 119  Temp 98.2  F (36.8  C) (Temporal)  Ht 6' 1\" (1.854 m)  Wt (!) 395 lb 9.6 oz (179.4 kg)  SpO2 95%  BMI 52.19 kg/m2  BMI= Body mass index is 52.19 kg/(m^2).  He is alert oriented appears in no distress.  Full range of motion of the neck without discomfort.  He has some tenderness around the shoulder with movement but no real restrictions.  Elbow with full range of motion.  Arm with no deformity.  Negative Tinel sign.   strength this may be down little bit on the left.  Good color good pulses.    ASSESSMENT:  Left arm pain with tingling into the hand    PLAN:  We will set him up for an EMG.  I do not think there is a concern for cardiac here as it is no other symptoms with it seems like some nerve impingement with the tingling.  We will start with an EMG.  May need to do some imaging of his shoulder and/or neck.    Weight management plan: Not discussed    Avery Zapata MD  [facility]              "

## 2018-02-23 NOTE — TELEPHONE ENCOUNTER
PA approved.  Effective date: 02/20/2018-20/20/2021  PA reference #: [id number]  Pt. notified:   Yes   LMOM that PA approved and patient to notify pharmacy.  Zeynep Chaidez RN

## 2018-02-28 ENCOUNTER — TRANSFERRED RECORDS (OUTPATIENT)
Dept: HEALTH INFORMATION MANAGEMENT | Facility: CLINIC | Age: 53
End: 2018-02-28

## 2018-03-07 ENCOUNTER — OFFICE VISIT (OUTPATIENT)
Dept: FAMILY MEDICINE | Facility: CLINIC | Age: 53
End: 2018-03-07
Payer: COMMERCIAL

## 2018-03-07 VITALS
BODY MASS INDEX: 41.75 KG/M2 | DIASTOLIC BLOOD PRESSURE: 78 MMHG | HEIGHT: 73 IN | WEIGHT: 315 LBS | SYSTOLIC BLOOD PRESSURE: 118 MMHG | OXYGEN SATURATION: 94 % | TEMPERATURE: 98.2 F | HEART RATE: 108 BPM

## 2018-03-07 DIAGNOSIS — G56.02 CARPAL TUNNEL SYNDROME OF LEFT WRIST: ICD-10-CM

## 2018-03-07 DIAGNOSIS — M47.22 OSTEOARTHRITIS OF SPINE WITH RADICULOPATHY, CERVICAL REGION: Primary | ICD-10-CM

## 2018-03-07 PROCEDURE — 99214 OFFICE O/P EST MOD 30 MIN: CPT | Performed by: FAMILY MEDICINE

## 2018-03-07 RX ORDER — HYDROCODONE BITARTRATE AND ACETAMINOPHEN 5; 325 MG/1; MG/1
1-2 TABLET ORAL EVERY 6 HOURS PRN
Qty: 30 TABLET | Refills: 0 | Status: SHIPPED | OUTPATIENT
Start: 2018-03-07 | End: 2019-01-18

## 2018-03-07 NOTE — NURSING NOTE
"Chief Complaint   Patient presents with     Results     EMG results        Initial /78 (BP Location: Right arm, Patient Position: Chair, Cuff Size: Adult Large)  Pulse 108  Temp 98.2  F (36.8  C) (Temporal)  Ht 6' 1\" (1.854 m)  Wt (!) 393 lb (178.3 kg)  SpO2 94%  BMI 51.85 kg/m2 Estimated body mass index is 51.85 kg/(m^2) as calculated from the following:    Height as of this encounter: 6' 1\" (1.854 m).    Weight as of this encounter: 393 lb (178.3 kg).  Medication Reconciliation: complete     "

## 2018-03-07 NOTE — MR AVS SNAPSHOT
"              After Visit Summary   3/7/2018    Sharif Giraldo    MRN: [id number]           Patient Information     Date Of Birth          1965        Visit Information        Provider Department      3/7/2018 11:20 AM Avery Zapata MD [facility]         Follow-ups after your visit        Your next 10 appointments already scheduled     Mar 07, 2018 11:20 AM CST   Office Visit with Avery Zapata MD   [facility] ([facility])    [address] [place] [id number]   [phone]           Bring a current list of meds and any records pertaining to this visit. For Physicals, please bring immunization records and any forms needing to be filled out. Please arrive 10 minutes early to complete paperwork.              Who to contact     If you have questions or need follow up information about today's clinic visit or your schedule please contact [facility] directly at [phone].  Normal or non-critical lab and imaging results will be communicated to you by MyChart, letter or phone within 4 business days after the clinic has received the results. If you do not hear from us within 7 days, please contact the clinic through [employer] or phone. If you have a critical or abnormal lab result, we will notify you by phone as soon as possible.  Submit refill requests through [employer] or call your pharmacy and they will forward the refill request to us. Please allow 3 business days for your refill to be completed.          Additional Information About Your Visit        [employer] Information     [employer] lets you send messages to your doctor, view your test results, renew your prescriptions, schedule appointments and more. To sign up, go to [email or web address] . Click on \"Log in\" on the left side of the screen, which will take you to the Welcome page. Then click on \"Sign up Now\" on the right side of the page.     You will be asked to enter the " access code listed below, as well as some personal information. Please follow the directions to create your username and password.     Your access code is: [email or web address]  Expires: 2018  9:53 AM     Your access code will  in 90 days. If you need help or a new code, please call your [facility] or [phone].        Care EveryWhere ID     This is your Care EveryWhere ID. This could be used by other organizations to access your [place] medical records  [id number]         Blood Pressure from Last 3 Encounters:   18 110/76   18 138/90   18 128/60    Weight from Last 3 Encounters:   18 (!) 395 lb 9.6 oz (179.4 kg)   18 (!) 397 lb 8 oz (180.3 kg)   18 (!) 382 lb (173.3 kg)              Today, you had the following     No orders found for display       Primary Care Provider Office Phone # Fax #    Avery Zapata [phone]       [facility] [address] [place] [address]        Equal Access to Services     [facility] DAVE : Hadii aad ku hadasho Soomaali, waaxda luqadaha, qaybta kaalmada adeegyada, waxay idiin hayaan cathy gramajo ah. So [facility] [phone].    ATENCIÓN: Si habla español, tiene a clark disposición servicios gratuitos de asistencia lingüística. Natacha al [phone].    We comply with applicable federal civil rights laws and [place] laws. We do not discriminate on the basis of race, color, national origin, age, disability, sex, sexual orientation, or gender identity.            Thank you!     Thank you for choosing [facility]  for your care. Our goal is always to provide you with excellent care. Hearing back from our patients is one way we can continue to improve our services. Please take a few minutes to complete the written survey that you may receive in the mail after your visit with us. Thank you!             Your Updated Medication List - Protect others around you: Learn how to safely use,  store and throw away your medicines at www.disposemymeds.org.          This list is accurate as of 3/7/18 11:18 AM.  Always use your most recent med list.                   Brand Name Dispense Instructions for use Diagnosis    Calcium carb-Vitamin D 500 mg [place]-200 units 500-200 MG-UNIT per tablet    OSCAL with D;Oyster Shell Calcium    1 tablet    Take 600mg daily        Calcium-Magnesium 300-300 MG Tabs      Take 300 mg by mouth daily        FLUoxetine 40 MG capsule    PROzac    30 capsule    TAKE 1 CAPSULE BY MOUTH DAILY    Major depressive disorder, recurrent episode, mild (H)       GLUCOSAMINE 1500 COMPLEX Caps           lisinopril-hydrochlorothiazide 20-25 MG per tablet    PRINZIDE/ZESTORETIC    90 tablet    TAKE ONE TABLET BY MOUTH EVERY DAY    Essential hypertension with goal blood pressure less than 140/90       Magnesium 400 MG Tabs      Take 400 mg % by mouth 2 times daily        meloxicam 15 MG tablet    MOBIC    90 tablet    Take 1 tablet (15 mg) by mouth daily    Primary osteoarthritis of left knee       order for DME     1 Device    Dispensed 1 Dorsal (Anterior) Night Splint, Size L/XL, with [facility] agreement signed by patient. Angy Garner [facility], November 30, 2015    Tendonitis, Achilles, left, Primary osteoarthritis of left knee       raltegravir 400 MG tablet    ISENTRESS     Take 400 mg by mouth 2 times daily        tadalafil 5 MG tablet    CIALIS    30 tablet    Take 1 tablet (5 mg) by mouth daily Never use with nitroglycerin, terazosin or doxazosin.    Male impotence       testosterone cypionate 200 MG/ML injection    DEPOTESTOTERONE    1 mL    Inject 1 mL (200 mg) into the muscle every 28 days    Hypogonadism male       Vitamin D (Cholecalciferol) 400 UNITS Tabs      Take 400 Units by mouth daily

## 2018-03-07 NOTE — PROGRESS NOTES
SUBJECTIVE:   Sharif Giraldo is a 52 year old male who presents to clinic today for the following health issues:    #1EMG results    #2 Pain medication for left and back. The whole arm is painful and is going into his back. He was told at his EMG appointment that he has a pinched nerve and carpal tunnel.     Concern - EMG results         Problem list and histories reviewed & adjusted, as indicated.  Additional history: as documented        Reviewed and updated as needed this visit by clinical staff       Reviewed and updated as needed this visit by Provider        SUBJECTIVE:  Sharif  is a 52 year old male who presents for: Discussion of his left arm pain shoulder pain and numbness and tingling in his hand.  Had an EMG.  Show C7 radiculopathy and carpal tunnel.  He wants to get diagnostics and referrals on both.  His arm is really bothering him.  Not able to clip his nails on his right with his left hand.  Has noticed weakness.  Constant aching.  Trouble sleeping at night.    Past Medical History:   Diagnosis Date     HTN (hypertension) 2012     Morbid obesity with BMI of 60.0-69.9, adult (H)      LUH (obstructive sleep apnea) 5/2013    Severe      Past Surgical History:   Procedure Laterality Date     C AFF EYE MUSCLE SURG PROC UNLISTED  1970,1974     C ANESTH,REPAIR LO ABD HERNIA NOS  2000,2001    umbilical area     COLONOSCOPY  9/20/2013    Procedure: COLONOSCOPY;  Colonoscopy;  Surgeon: Avery Morales MD;  Location: [facility]     HC EXCISE EXCESS SKIN TISSUE,OTHER      Hx; staph complications of hernia surg     HC KNEE SCOPE, DIAGNOSTIC  1999,2000    Arthroscopy, Knee     HC REMOVAL OF TONSILS,<11 Y/O  1973    Tonsils <10y.o.     HC REPAIR INITIAL INCISIONAL HERNIA; INCARCERATED OR STRANGULATED  09/25/2002    Mesh repair of incarcerated incisional hernia, recurrent.     HERNIA REPAIR, INCISIONAL  10/09/08    Ventral herniorrhaphy/mesh placement.  Excision left elbow mass.     Social History    Substance Use Topics     Smoking status: Current Every Day Smoker     Packs/day: 0.01     Years: 10.00     Types: Cigars     Smokeless tobacco: Never Used      Comment:  cigars 2/ day     Alcohol use 0.0 oz/week     0 Standard drinks or equivalent per week      Comment: 1 a week      Current Outpatient Prescriptions   Medication Sig Dispense Refill     HYDROcodone-acetaminophen (NORCO) 5-325 MG per tablet Take 1-2 tablets by mouth every 6 hours as needed for moderate to severe pain 30 tablet 0     Calcium-Magnesium 300-300 MG TABS Take 300 mg by mouth daily       Vitamin D, Cholecalciferol, 400 UNITS TABS Take 400 Units by mouth daily       raltegravir (ISENTRESS) 400 MG tablet Take 400 mg by mouth 2 times daily       lisinopril-hydrochlorothiazide (PRINZIDE/ZESTORETIC) 20-25 MG per tablet TAKE ONE TABLET BY MOUTH EVERY DAY 90 tablet 2     testosterone cypionate (DEPOTESTOTERONE) 200 MG/ML injection Inject 1 mL (200 mg) into the muscle every 28 days 1 mL 5     Magnesium 400 MG TABS Take 400 mg % by mouth 2 times daily       meloxicam (MOBIC) 15 MG tablet Take 1 tablet (15 mg) by mouth daily 90 tablet 3     FLUoxetine (PROZAC) 40 MG capsule TAKE 1 CAPSULE BY MOUTH DAILY 30 capsule 1     order for DME Dispensed 1 Dorsal (Anterior) Night Splint, Size L/XL, with [facility] agreement signed by patient. Angy Garner [facility], November 30, 2015 1 Device 0     calcium carb 1250 mg, 500 mg [place],/vitamin D 200 units (OSCAL WITH D) 500-200 MG-UNIT per tablet Take 600mg daily 1 tablet      tadalafil (CIALIS) 5 MG tablet Take 1 tablet (5 mg) by mouth daily Never use with nitroglycerin, terazosin or doxazosin. 30 tablet 1     Glucosamine-Chondroit-Vit C-Mn (GLUCOSAMINE 1500 COMPLEX) CAPS          REVIEW OF SYSTEMS:   5 point ROS negative except as noted above in HPI, including Gen., Resp, CV, GI &  system review.     OBJECTIVE:  Vitals: /78 (BP Location: Right arm, Patient Position: Chair, Cuff Size: Adult Large)  Pulse 108   "Temp 98.2  F (36.8  C) (Temporal)  Ht 6' 1\" (1.854 m)  Wt (!) 393 lb (178.3 kg)  SpO2 94%  BMI 51.85 kg/m2  BMI= Body mass index is 51.85 kg/(m^2).  He is alert and in no distress.  Neck supple good range of motion.  Some tenderness the base of the left trapezius muscle area.  Good range of motion of the shoulder.  Hip strength seems maybe a little diminished on the left.  No muscle atrophy noted.  EMG shows carpal tunnel and C7 radiculopathy.    ASSESSMENT:  #1 cervical disc disease with radiculopathy #2 Carpal Tunnel syndrome    PLAN:  We will be setting him up for an MRI probably need open sided.  We will get him in to see neurosurgery following this.  He wants to concentrate on the neck and arm pain before the carpal tunnel.  Some physical therapy.  Will prescribe some Vicodin for the pain especially at night.        Avery Zapata MD  [facility]              "

## 2018-03-08 ENCOUNTER — TRANSFERRED RECORDS (OUTPATIENT)
Dept: HEALTH INFORMATION MANAGEMENT | Facility: CLINIC | Age: 53
End: 2018-03-08

## 2018-03-12 ENCOUNTER — THERAPY VISIT (OUTPATIENT)
Dept: PHYSICAL THERAPY | Facility: CLINIC | Age: 53
End: 2018-03-12
Payer: COMMERCIAL

## 2018-03-12 DIAGNOSIS — M54.2 CERVICAL PAIN: Primary | ICD-10-CM

## 2018-03-12 PROCEDURE — 97161 PT EVAL LOW COMPLEX 20 MIN: CPT | Mod: GP | Performed by: PHYSICAL THERAPIST

## 2018-03-12 PROCEDURE — 97110 THERAPEUTIC EXERCISES: CPT | Mod: GP | Performed by: PHYSICAL THERAPIST

## 2018-03-12 NOTE — MR AVS SNAPSHOT
After Visit Summary   3/12/2018    Sharif Giraldo    MRN: [id number]           Patient Information     Date Of Birth          1965        Visit Information        Provider Department      3/12/2018 7:30 AM Anil Molina PT [facility]        Today's Diagnoses     Cervical pain    -  1       Follow-ups after your visit        Your next 10 appointments already scheduled     Mar 19, 2018  7:30 AM CDT   HALEY Spine with Ruthann Scruggs PTA   [facility] ([facility]  )    [address] [place] [id number] [phone]            Mar 23, 2018  2:10 PM CDT   New Visit with Gustavo Bosch PA-C   [facility]    [address] [place] [id number] [phone]            Mar 26, 2018  7:30 AM CDT   HALEY Spine with Ruthann Scruggs PTA   [facility] ([address] [address] [phone]              Who to contact     If you have questions or need follow up information about today's clinic visit or your schedule please contact [facility] PH[facility] THER[facility] directly at [phone].  Normal or non-critical lab and imaging results will be communicated to you by MyChart, letter or phone within 4 business days after the clinic has received the results. If you do not hear from us within 7 days, please contact the clinic through MyChart or phone. If you have a critical or abnormal lab result, we will notify you by phone as soon as possible.  Submit refill requests through [employer] or call your pharmacy and they will forward the refill request to us. Please allow 3 business days for your refill to be completed.          Additional Information About Your Visit        [employer] Information     [employer] lets you send messages to your  "doctor, view your test results, renew your prescriptions, schedule appointments and more. To sign up, go to [email or web address] . Click on \"Log in\" on the left side of the screen, which will take you to the Welcome page. Then click on \"Sign up Now\" on the right side of the page.     You will be asked to enter the access code listed below, as well as some personal information. Please follow the directions to create your username and password.     Your access code is: [email or web address]  Expires: 2018 10:53 AM     Your access code will  in 90 days. If you need help or a new code, please call your [place] clinic or [phone].        Care EveryWhere ID     This is your Care EveryWhere ID. This could be used by other organizations to access your [place] medical records  [id number]         Blood Pressure from Last 3 Encounters:   18 118/78   18 110/76   18 138/90    Weight from Last 3 Encounters:   18 (!) 178.3 kg (393 lb)   18 (!) 179.4 kg (395 lb 9.6 oz)   18 (!) 180.3 kg (397 lb 8 oz)              We Performed the Following     HC PT EVAL, LOW COMPLEXITY     HALEY INITIAL EVAL REPORT     THERAPEUTIC EXERCISES        Primary Care Provider Office Phone # Fax #    Avery Zapata [phone]       [facility] [address] [place] [address]        Equal Access to Services     JULIA ACOSTA : Hadii arlette ku hadasho Soomaali, waaxda luqadaha, qaybta kaalmada adeegyada, brian pang. So [facility] [phone].    ATENCIÓN: Si habla español, tiene a clark disposición servicios gratuitos de asistencia lingüística. Llame al [phone].    We comply with applicable federal civil rights laws and [place] laws. We do not discriminate on the basis of race, color, national origin, age, disability, sex, sexual orientation, or gender identity.            Thank you!     Thank you for choosing [facility] " [facility]  for your care. Our goal is always to provide you with excellent care. Hearing back from our patients is one way we can continue to improve our services. Please take a few minutes to complete the written survey that you may receive in the mail after your visit with us. Thank you!             Your Updated Medication List - Protect others around you: Learn how to safely use, store and throw away your medicines at www.disposemymeds.org.          This list is accurate as of 3/12/18  8:34 AM.  Always use your most recent med list.                   Brand Name Dispense Instructions for use Diagnosis    Calcium carb-Vitamin D 500 mg [place]-200 units 500-200 MG-UNIT per tablet    OSCAL with D;Oyster Shell Calcium    1 tablet    Take 600mg daily        Calcium-Magnesium 300-300 MG Tabs      Take 300 mg by mouth daily        FLUoxetine 40 MG capsule    PROzac    30 capsule    TAKE 1 CAPSULE BY MOUTH DAILY    Major depressive disorder, recurrent episode, mild (H)       GLUCOSAMINE 1500 COMPLEX Caps           HYDROcodone-acetaminophen 5-325 MG per tablet    NORCO    30 tablet    Take 1-2 tablets by mouth every 6 hours as needed for moderate to severe pain    Osteoarthritis of spine with radiculopathy, cervical region       lisinopril-hydrochlorothiazide 20-25 MG per tablet    PRINZIDE/ZESTORETIC    90 tablet    TAKE ONE TABLET BY MOUTH EVERY DAY    Essential hypertension with goal blood pressure less than 140/90       Magnesium 400 MG Tabs      Take 400 mg % by mouth 2 times daily        meloxicam 15 MG tablet    MOBIC    90 tablet    Take 1 tablet (15 mg) by mouth daily    Primary osteoarthritis of left knee       order for DME     1 Device    Dispensed 1 Dorsal (Anterior) Night Splint, Size L/XL, with [facility] agreement signed by patient. Angy Garner CMA, November 30, 2015    Tendonitis, Achilles, left, Primary osteoarthritis of left knee       raltegravir 400 MG tablet    ISENTRESS     Take 400 mg  by mouth 2 times daily        tadalafil 5 MG tablet    CIALIS    30 tablet    Take 1 tablet (5 mg) by mouth daily Never use with nitroglycerin, terazosin or doxazosin.    Male impotence       testosterone cypionate 200 MG/ML injection    DEPOTESTOTERONE    1 mL    Inject 1 mL (200 mg) into the muscle every 28 days    Hypogonadism male       Vitamin D (Cholecalciferol) 400 UNITS Tabs      Take 400 Units by mouth daily

## 2018-03-12 NOTE — PROGRESS NOTES
Welcome for Athletic Medicine Initial Evaluation  Subjective:  Patient is a 52 year old male presenting with rehab cervical spine hpi.   Sharif Giraldo is a 52 year old male with a cervical spine condition.  Condition occurred with:  Insidious onset.  Condition occurred: for unknown reasons.  This is a new condition  Numbness/tingling down L arm started 1/12/18. Symptoms have progressively gotten worse, with pain in L superior shoulder blade and medial forearm. Numbness/tingling persists all the way from neck down into hand (L side only). MRI/EMG results showed cervical DJD, radiculopathy and carpal tunnel. Follow up with neurosurgery on 3/23/18.  .    Patient reports pain:  Lower cervical spine.  Radiates to:  Shoulder left, upper arm left, elbow left, lower arm left and hand left.  Pain is described as shooting and is intermittent and reported as 7/10.  Associated symptoms:  Numbness and tingling. Pain is worse during the day.  Symptoms are exacerbated by lifting and lying down and relieved by rest and NSAID's.  Since onset symptoms are gradually worsening.  Special tests:  MRI (waiting for MRI results on 3/23/18).    There was no improvement following previous treatment.  General health as reported by patient is fair.  Past medical history: overweight, high BP, smoking, sleep disorder/apnea.    Surgical history: L knee, abdominal surgery.  Current medications:  Pain medication, anti-inflammatory and high blood pressure medication.  Current occupation is .    Primary job tasks include:  Prolonged sitting (computer work).    Barriers include:  None as reported by the patient.    Red flags:  None as reported by the patient.                        Objective:  Standing Alignment:    Cervical/Thoracic:  Forward head and thoracic kyphosis increased  Shoulder/UE:  Rounded shoulders                                  Cervical/Thoracic Evaluation    AROM:  AROM Cervical:    Flexion:            100% no  pain  Extension:       50% mild pain L shoulder  Rotation:         Left: 50%     Right: 50%  Side Bend:      Left: 50% no pain     Right:  50% no pain    Strength: difficulty maintaing neutral cervical spine posture (tends to protrude head forward)  Headaches: none  Cervical Myotomes:    C1-2 (Neck Flex): Left:  5      C3 (neck side bend): Left: 5      C4 (shrug):  Left: 5      C5 (Deltoid):  Left: 5      C6 (Biceps):  Left: 5      C7 (Triceps):  Left: 5      C8 (Thumb Ext): Left: 5      T1 (Intrinsics): Left: 5      DTR's:  not assessed        Neural Tension:      Left side:  Radial; Ulnar and Median  negative.    Cervical Dermatomes:  normal  C1 left:  Normal-light touch         C2 left:  Normal-light touch       C3 left:  Normal-light touch       C4 left:  Normal-light touch       C5 left:  Normal-light touch       C6 left:  Normal-light touch       C7 left:  Normal-light touch       C8 left:  Normal-light touch       T1 left:  Normal-light touch       Cervical Palpation:  : denies tenderness throughout cervical spine. does not respond to manual traction.            Cord Sign:  not assessed         Shoulder Evaluation:  ROM:  AROM:    Flexion:  Left:  WNL    Right:  WNL    Abduction:  Left: WNL   Right:  WNL    Internal Rotation:  Left:  WNL    Right:  WNL  External Rotation:  Left:  WNL    Right:  WNL      Elbow Flexion:  Left:  WNL    Right:  WNL  Elbow Extension:  Left:  WNL   Right:  WNL          Pain: no pain with AROM in all directions    Strength:    Flexion: Left:5/5    Pain: -    Right: 5/5      Pain:  -  Extension:  Left: 5/5     Pain:-    Right: 5/5     Pain:-  Abduction:  Left: 5/5   Pain:-    Right: 5/5      Pain:-    Internal Rotation:  Left:5-/5      Pain:-    Right: 5-/5      Pain:-  External Rotation:   Left:5-/5      Pain:-   Right:5-/5      Pain:-        Elbow Flexion:  Left:5/5      Pain:-    Right:5/5      Pain:-  Elbow Extension:  Left:5/5      Pain:-    Right:5/5      Pain:-              ROM:          :  Dominance: Right   Left: 60 pounds force      Right: 85 pounds force    Special Testing:  Special tests elbow/wrist: L: (-) Tinel's sign for carpel tunnel syndrome.                                      General     ROS    Assessment/Plan:    Patient is a 52 year old male with cervical complaints.    Patient has the following significant findings with corresponding treatment plan.                Diagnosis 1:  Left sided cervical radiculopathy.  Pain -  hot/cold therapy, US, electric stimulation, mechanical traction, manual therapy, splint/taping/bracing/orthotics, self management, education, directional preference exercise and home program  Decreased ROM/flexibility - manual therapy and therapeutic exercise  Decreased joint mobility - manual therapy and therapeutic exercise  Decreased strength - therapeutic exercise and therapeutic activities  Inflammation - cold therapy, US, electric stimulation and self management/home program  Impaired posture - neuro re-education, therapeutic activities and home program    Therapy Evaluation Codes:   1) History comprised of:   Personal factors that impact the plan of care:      None.    Comorbidity factors that impact the plan of care are:      High blood pressure, Overweight, Sleep disorder/apnea and Smoking.     Medications impacting care: Anti-inflammatory, High blood pressure and Pain.  2) Examination of Body Systems comprised of:   Body structures and functions that impact the plan of care:      Cervical spine.   Activity limitations that impact the plan of care are:      Driving, Dressing, Grasping, Lifting, Reading/Computer work, Sleeping and Laying down.  3) Clinical presentation characteristics are:   Stable/Uncomplicated.  4) Decision-Making    Low complexity using standardized patient assessment instrument and/or measureable assessment of functional outcome.  Cumulative Therapy Evaluation is: Low complexity.    Previous and current  functional limitations:  (See Goal Flow Sheet for this information)    Short term and Long term goals: (See Goal Flow Sheet for this information)     Communication ability:  Patient appears to be able to clearly communicate and understand verbal and written communication and follow directions correctly.  Treatment Explanation - The following has been discussed with the patient:   RX ordered/plan of care  Anticipated outcomes  Possible risks and side effects  This patient would benefit from PT intervention to resume normal activities.   Rehab potential is good.    Frequency:  1 X week, once daily  Duration:  for 8 weeks  Discharge Plan:  Achieve all LTG.  Independent in home treatment program.  Reach maximal therapeutic benefit.    Please refer to the daily flowsheet for treatment today, total treatment time and time spent performing 1:1 timed codes.

## 2018-03-19 ENCOUNTER — THERAPY VISIT (OUTPATIENT)
Dept: PHYSICAL THERAPY | Facility: CLINIC | Age: 53
End: 2018-03-19
Payer: COMMERCIAL

## 2018-03-19 DIAGNOSIS — M54.2 CERVICAL PAIN: ICD-10-CM

## 2018-03-19 PROCEDURE — 97012 MECHANICAL TRACTION THERAPY: CPT | Mod: GP

## 2018-03-19 PROCEDURE — 97035 APP MDLTY 1+ULTRASOUND EA 15: CPT | Mod: GP

## 2018-03-19 PROCEDURE — 97110 THERAPEUTIC EXERCISES: CPT | Mod: GP

## 2018-03-19 NOTE — MR AVS SNAPSHOT
"              After Visit Summary   3/19/2018    Sharif Giraldo    MRN: [id number]           Patient Information     Date Of Birth          1965        Visit Information        Provider Department      3/19/2018 7:30 AM Ruthann Scruggs PTA [facility]        Today's Diagnoses     Cervical pain           Follow-ups after your visit        Your next 10 appointments already scheduled     Mar 23, 2018  2:10 PM CDT   New Visit with Gustavo Bosch PA-C   [facility]    [address] [place] [id number] [phone]            Mar 26, 2018  7:30 AM CDT   HALEY Spine with Ruthann Scruggs PTA   [facility] ([facility]  )    [address] [place] [address] [phone]              Who to contact     If you have questions or need follow up information about today's clinic visit or your schedule please contact [facility] PHYSICAL THERAP[facility] directly at [phone].  Normal or non-critical lab and imaging results will be communicated to you by thereNowhart, letter or phone within 4 business days after the clinic has received the results. If you do not hear from us within 7 days, please contact the clinic through [employer] or phone. If you have a critical or abnormal lab result, we will notify you by phone as soon as possible.  Submit refill requests through [employer] or call your pharmacy and they will forward the refill request to us. Please allow 3 business days for your refill to be completed.          Additional Information About Your Visit        MyChart Information     [employer] lets you send messages to your doctor, view your test results, renew your prescriptions, schedule appointments and more. To sign up, go to [email or web address] . Click on \"Log in\" on the left side of the screen, which will take you to the Welcome page. " "Then click on \"Sign up Now\" on the right side of the page.     You will be asked to enter the access code listed below, as well as some personal information. Please follow the directions to create your username and password.     Your access code is: [email or web address]  Expires: 2018 10:53 AM     Your access code will  in 90 days. If you need help or a new code, please call your [place] clinic or [phone].        Care EveryWhere ID     This is your Care EveryWhere ID. This could be used by other organizations to access your [place] medical records  [id number]         Blood Pressure from Last 3 Encounters:   18 118/78   18 110/76   18 138/90    Weight from Last 3 Encounters:   18 (!) 178.3 kg (393 lb)   18 (!) 179.4 kg (395 lb 9.6 oz)   18 (!) 180.3 kg (397 lb 8 oz)              We Performed the Following     Mechanical Traction Therapy     Therapeutic Exercises     Ultrasound Therapy        Primary Care Provider Office Phone # Fax #    Avery Zapata [phone]       [facility] [address] [place] [address]        Equal Access to Services     JULIA ACOSTA : Hadii aad ku hadasho Soomaali, waaxda luqadaha, qaybta kaalmada adeegyada, waxay idiin haymac pang. So [facility] [phone].    ATENCIÓN: Si habla español, tiene a clark disposición servicios gratuitos de asistencia lingüística. Llame al [phone].    We comply with applicable federal civil rights laws and [place] laws. We do not discriminate on the basis of race, color, national origin, age, disability, sex, sexual orientation, or gender identity.            Thank you!     Thank you for choosing [facility] THER[facility]  for your care. Our goal is always to provide you with excellent care. Hearing back from our patients is one way we can continue to improve our services. Please take a few minutes to complete the " written survey that you may receive in the mail after your visit with us. Thank you!             Your Updated Medication List - Protect others around you: Learn how to safely use, store and throw away your medicines at www.disposemymeds.org.          This list is accurate as of 3/19/18  8:31 AM.  Always use your most recent med list.                   Brand Name Dispense Instructions for use Diagnosis    Calcium carb-Vitamin D 500 mg [place]-200 units 500-200 MG-UNIT per tablet    OSCAL with D;Oyster Shell Calcium    1 tablet    Take 600mg daily        Calcium-Magnesium 300-300 MG Tabs      Take 300 mg by mouth daily        FLUoxetine 40 MG capsule    PROzac    30 capsule    TAKE 1 CAPSULE BY MOUTH DAILY    Major depressive disorder, recurrent episode, mild (H)       GLUCOSAMINE 1500 COMPLEX Caps           HYDROcodone-acetaminophen 5-325 MG per tablet    NORCO    30 tablet    Take 1-2 tablets by mouth every 6 hours as needed for moderate to severe pain    Osteoarthritis of spine with radiculopathy, cervical region       lisinopril-hydrochlorothiazide 20-25 MG per tablet    PRINZIDE/ZESTORETIC    90 tablet    TAKE ONE TABLET BY MOUTH EVERY DAY    Essential hypertension with goal blood pressure less than 140/90       Magnesium 400 MG Tabs      Take 400 mg % by mouth 2 times daily        meloxicam 15 MG tablet    MOBIC    90 tablet    Take 1 tablet (15 mg) by mouth daily    Primary osteoarthritis of left knee       order for DME     1 Device    Dispensed 1 Dorsal (Anterior) Night Splint, Size L/XL, with [facility] agreement signed by patient. Angy Garner CMA, November 30, 2015    Tendonitis, Achilles, left, Primary osteoarthritis of left knee       raltegravir 400 MG tablet    ISENTRESS     Take 400 mg by mouth 2 times daily        tadalafil 5 MG tablet    CIALIS    30 tablet    Take 1 tablet (5 mg) by mouth daily Never use with nitroglycerin, terazosin or doxazosin.    Male impotence       testosterone cypionate 200 MG/ML  injection    DEPOTESTOTERONE    1 mL    Inject 1 mL (200 mg) into the muscle every 28 days    Hypogonadism male       Vitamin D (Cholecalciferol) 400 UNITS Tabs      Take 400 Units by mouth daily

## 2018-03-19 NOTE — PROGRESS NOTES
Subjective:  HPI                    Objective:  System    Physical Exam    General     ROS    Assessment/Plan:    SUBJECTIVE  Subjective changes as noted by pt:   Pt reports being very dizzy when woke up today. More pain in neck. Continued tingling into L hand and fingers.    Current pain level: No change     Changes in function:  None     Adverse reaction to treatment or activity:  None    OBJECTIVE  Changes in objective findings:  Yes,   Objective: Tender with PA pressure at C7-T2. Segmental mobility appears WNL.  Manual and mech traction gave no immediate relief. Min tender suboccipitals. Any change of position worsened dizziness today.  Will see specialist 3/23     ASSESSMENT  Sharif continues to require intervention to meet STG and LTG's: PT, although may benefit from vestibular PT if dizziness doesn't improve.    No change of symptoms has been noted.  Response to therapy has shown lack of progress in  pain level and radicular symptoms  Progress made towards STG/LTG?  None    PLAN  The following modalities have been added:  ultrasound and mechanical traction    PTA/ATC plan:  Will discuss above changes with PT.    Please refer to the daily flowsheet for treatment today, total treatment time and time spent performing 1:1 timed codes.

## 2018-03-23 ENCOUNTER — OFFICE VISIT (OUTPATIENT)
Dept: NEUROSURGERY | Facility: CLINIC | Age: 53
End: 2018-03-23
Payer: COMMERCIAL

## 2018-03-23 VITALS — TEMPERATURE: 97 F | BODY MASS INDEX: 41.75 KG/M2 | WEIGHT: 315 LBS | HEIGHT: 73 IN

## 2018-03-23 DIAGNOSIS — M54.12 CERVICAL RADICULOPATHY: Primary | ICD-10-CM

## 2018-03-23 PROCEDURE — 99203 OFFICE O/P NEW LOW 30 MIN: CPT | Performed by: PHYSICIAN ASSISTANT

## 2018-03-23 ASSESSMENT — PAIN SCALES - GENERAL: PAINLEVEL: MODERATE PAIN (4)

## 2018-03-23 NOTE — LETTER
"    3/23/2018         RE: Sharif Giraldo  [address] [facility] [address]        Dear Colleague,    Thank you for referring your patient, Sharif Giraldo, to the [facility]. Please see a copy of my visit note below.    Sharif Giraldo is a 52 year old male who presents for:  Chief Complaint   Patient presents with     Consult     c spine pain per Dr. Zapata         Initial Vitals:  Temp 97  F (36.1  C)  Ht 6' 1\" (1.854 m)  Wt (!) 397 lb (180.1 kg)  BMI 52.38 kg/m2 Estimated body mass index is 52.38 kg/(m^2) as calculated from the following:    Height as of this encounter: 6' 1\" (1.854 m).    Weight as of this encounter: 397 lb (180.1 kg).. Body surface area is 3.05 meters squared. BP completed using cuff size: NA (Not Taken)  Moderate Pain (4)    Do you feel safe in your environment?  Yes  Do you need any refills today? No    Nursing Comments:         Viridiana Minor  [facility]  Neurosurgery Clinic Visit      CC: neck pain    Primary care Provider: Avery Zapata    Referring Provider: Avery Zapata MD      Reason For Visit:   I was asked to consult on the patient for neck pain, left arm pain.      HPI: Sharif Giraldo is a 52 year old male who presents for evaluation of his chief complaint of neck pain and left arm pain.  He describes pain that radiates down the left side of his neck and shoulder, into the posterior lateral aspect of his left upper arm.  He does have some numbness and tingling down this distribution as well.  Symptoms have been present for about 1 month, with no particular event or injury.  He also states that he feels his left hand is developing some weakness.  He has just begun treatment with physical therapy.  He is not taking any pain medication.  He denies any bowel or bladder changes, or problems with balance or coordination.  However, he does also mention that he has been dealing with some vertigo type " symptoms over the last couple of months as well.    Past Medical History:   Diagnosis Date     HTN (hypertension) 2012     Morbid obesity with BMI of 60.0-69.9, adult (H)      LUH (obstructive sleep apnea) 5/2013    Severe        Past Medical History reviewed with patient during visit.    Past Surgical History:   Procedure Laterality Date     C AFF EYE MUSCLE SURG PROC UNLISTED  1970,1974     C ANESTH,REPAIR LO ABD HERNIA NOS  2000,2001    umbilical area     COLONOSCOPY  9/20/2013    Procedure: COLONOSCOPY;  Colonoscopy;  Surgeon: Avery Morales MD;  Location: [facility]     HC EXCISE EXCESS SKIN TISSUE,OTHER      Hx; staph complications of hernia surg     HC KNEE SCOPE, DIAGNOSTIC  1999,2000    Arthroscopy, Knee     HC REMOVAL OF TONSILS,<11 Y/O  1973    Tonsils <10y.o.     HC REPAIR INITIAL INCISIONAL HERNIA; INCARCERATED OR STRANGULATED  09/25/2002    Mesh repair of incarcerated incisional hernia, recurrent.     HERNIA REPAIR, INCISIONAL  10/09/08    Ventral herniorrhaphy/mesh placement.  Excision left elbow mass.     Past Surgical History reviewed with patient during visit.    Current Outpatient Prescriptions   Medication     HYDROcodone-acetaminophen (NORCO) 5-325 MG per tablet     Calcium-Magnesium 300-300 MG TABS     Vitamin D, Cholecalciferol, 400 UNITS TABS     raltegravir (ISENTRESS) 400 MG tablet     lisinopril-hydrochlorothiazide (PRINZIDE/ZESTORETIC) 20-25 MG per tablet     testosterone cypionate (DEPOTESTOTERONE) 200 MG/ML injection     Magnesium 400 MG TABS     meloxicam (MOBIC) 15 MG tablet     FLUoxetine (PROZAC) 40 MG capsule     order for DME     calcium carb 1250 mg, 500 mg [place],/vitamin D 200 units (OSCAL WITH D) 500-200 MG-UNIT per tablet     Glucosamine-Chondroit-Vit C-Mn (GLUCOSAMINE 1500 COMPLEX) CAPS     tadalafil (CIALIS) 5 MG tablet     No current facility-administered medications for this visit.        Allergies   Allergen Reactions     No Known Drug Allergies        Social History  "    Social History     Marital status:      Spouse name: Amparo     Number of children: 0     Years of education: 16     Occupational History     [employer]     Social History Main Topics     Smoking status: Current Every Day Smoker     Packs/day: 0.01     Years: 10.00     Types: Cigars     Smokeless tobacco: Never Used      Comment:  cigars 2/ day     Alcohol use 0.0 oz/week     0 Standard drinks or equivalent per week      Comment: 1 a week      Drug use: No     Sexual activity: Yes     Partners: Female     Other Topics Concern      Service No     Blood Transfusions No     Caffeine Concern Yes     coffee/tea/pop: 3/d       Occupational Exposure No     Hobby Hazards Yes          Sleep Concern No     Stress Concern No     Weight Concern Yes     desire wt loss     Special Diet No     Back Care No     Exercise No     Bike Helmet No     Seat Belt Yes     Social History Narrative       Family History   Problem Relation Age of Onset     HEART DISEASE Paternal Grandfather      heart attack     CEREBROVASCULAR DISEASE Paternal Grandfather      Alzheimer Disease Paternal Grandfather      HEART DISEASE Maternal Grandmother      heart attack     DIABETES Maternal Grandmother      adult onset     Alzheimer Disease Father      snores     Alzheimer Disease Paternal Grandmother      Hypertension Mother      DIABETES Mother           ROS: 10 point ROS neg other than the symptoms noted above in the HPI.    Vital Signs: Temp 97  F (36.1  C)  Ht 6' 1\" (1.854 m)  Wt (!) 397 lb (180.1 kg)  BMI 52.38 kg/m2    Examination:  Constitutional:  Alert, well nourished, NAD.  HEENT: Normocephalic, atraumatic.   Pulmonary:  Without shortness of breath, normal effort.   Lymph: no lymphadenopathy to low back or LE.   Integumentary: Skin is free of rashes or lesions.   Cardiovascular:  No pitting edema of BLE.    Psych: Normal affect, no apparent distress    Neurological:  Awake  Alert  Oriented x 3  Speech " clear  Cranial nerves II - XII grossly intact  Motor exam   Shoulder Abduction:  Right:  5/5   Left:  5/5  Biceps:                      Right:  5/5   Left:  5/5  Triceps:                     Right:  5/5   Left:  5/5  Wrist Extensors:       Right:  5/5   Left:  5/5  Wrist Flexors:           Right:  5/5   Left:  5/5  Intrinsics:                   Right:  5/5   Left:  5/5  Strength is overall normal in the upper extremities, but  strength does seem to be slightly decreased on the left  Sensation normal to bilateral upper and lower extremities.    Reflexes are 2+ in the brachial radialis and triceps. Negative Paty sign bilaterally.    Musculoskeletal:  Gait: Able to stand from a seated position. Normal non-antalgic, non-myelopathic gait.  Able to heel/toe walk without loss of balance  Cervical examination reveals good range of motion.  No tenderness to palpation of the cervical spine or paraspinous muscles bilaterally.    Imaging:   MRI of the cervical spine from [facility] was reviewed in the office today.  It demonstrates multiple levels of disc degeneration, with left paracentral disc herniation at C6-7.  There is also disc herniation at C7-T1.  Both of these contribute to bilateral foraminal stenosis.    Assessment/Plan:     Cervicalgia  Cervical disc herniation C6-7 and C7-T1  I lateral upper extremity paresthesias      Sharif Giraldo is a 52 year old male.  I did have a discussion with the patient and his wife regarding his symptoms.  He understands the findings described in his MRI above.  He has multiple levels of disc herniation at C6-7 and C7-T1, with associated radicular left worse than right arm pain.  He has just begun physical therapy.  He has not had any injections.  I would like to get him set up for a translaminar epidural injection at C7-T1 with Dr. Andrade.  I will have the patient follow-up with Dr. Minor after the injection.  He voiced agreement and understanding.          Gustavo Bosch  [facility]  [facility]  [address] [address]    Tel [phone]  Pager [phone]      Again, thank you for allowing me to participate in the care of your patient.        Sincerely,        Gustavo Bosch PA-C

## 2018-03-23 NOTE — PROGRESS NOTES
Dr. Martin Minor  [facility]  Neurosurgery Clinic Visit      CC: neck pain    Primary care Provider: Avery Zapata    Referring Provider: Avery Zapata MD      Reason For Visit:   I was asked to consult on the patient for neck pain, left arm pain.      HPI: Sharif Giraldo is a 52 year old male who presents for evaluation of his chief complaint of neck pain and left arm pain.  He describes pain that radiates down the left side of his neck and shoulder, into the posterior lateral aspect of his left upper arm.  He does have some numbness and tingling down this distribution as well.  Symptoms have been present for about 1 month, with no particular event or injury.  He also states that he feels his left hand is developing some weakness.  He has just begun treatment with physical therapy.  He is not taking any pain medication.  He denies any bowel or bladder changes, or problems with balance or coordination.  However, he does also mention that he has been dealing with some vertigo type symptoms over the last couple of months as well.    Past Medical History:   Diagnosis Date     HTN (hypertension) 2012     Morbid obesity with BMI of 60.0-69.9, adult (H)      LUH (obstructive sleep apnea) 5/2013    Severe        Past Medical History reviewed with patient during visit.    Past Surgical History:   Procedure Laterality Date     C AFF EYE MUSCLE SURG PROC UNLISTED  1970,1974     C ANESTH,REPAIR LO ABD HERNIA NOS  2000,2001    umbilical area     COLONOSCOPY  9/20/2013    Procedure: COLONOSCOPY;  Colonoscopy;  Surgeon: Avery Morales MD;  Location:  GI     HC EXCISE EXCESS SKIN TISSUE,OTHER      Hx; staph complications of hernia surg     HC KNEE SCOPE, DIAGNOSTIC  1999,2000    Arthroscopy, Knee     HC REMOVAL OF TONSILS,<9 Y/O  1973    Tonsils <10y.o.     HC REPAIR INITIAL INCISIONAL HERNIA; INCARCERATED OR STRANGULATED  09/25/2002    Mesh repair of incarcerated incisional hernia,  recurrent.     HERNIA REPAIR, INCISIONAL  10/09/08    Ventral herniorrhaphy/mesh placement.  Excision left elbow mass.     Past Surgical History reviewed with patient during visit.    Current Outpatient Prescriptions   Medication     HYDROcodone-acetaminophen (NORCO) 5-325 MG per tablet     Calcium-Magnesium 300-300 MG TABS     Vitamin D, Cholecalciferol, 400 UNITS TABS     raltegravir (ISENTRESS) 400 MG tablet     lisinopril-hydrochlorothiazide (PRINZIDE/ZESTORETIC) 20-25 MG per tablet     testosterone cypionate (DEPOTESTOTERONE) 200 MG/ML injection     Magnesium 400 MG TABS     meloxicam (MOBIC) 15 MG tablet     FLUoxetine (PROZAC) 40 MG capsule     order for DME     calcium carb 1250 mg, 500 mg [place],/vitamin D 200 units (OSCAL WITH D) 500-200 MG-UNIT per tablet     Glucosamine-Chondroit-Vit C-Mn (GLUCOSAMINE 1500 COMPLEX) CAPS     tadalafil (CIALIS) 5 MG tablet     No current facility-administered medications for this visit.        Allergies   Allergen Reactions     No Known Drug Allergies        Social History     Social History     Marital status:      Spouse name: Amparo     Number of children: 0     Years of education: 16     Occupational History     [employer]     Social History Main Topics     Smoking status: Current Every Day Smoker     Packs/day: 0.01     Years: 10.00     Types: Cigars     Smokeless tobacco: Never Used      Comment:  cigars 2/ day     Alcohol use 0.0 oz/week     0 Standard drinks or equivalent per week      Comment: 1 a week      Drug use: No     Sexual activity: Yes     Partners: Female     Other Topics Concern      Service No     Blood Transfusions No     Caffeine Concern Yes     coffee/tea/pop: 3/d       Occupational Exposure No     Hobby Hazards Yes          Sleep Concern No     Stress Concern No     Weight Concern Yes     desire wt loss     Special Diet No     Back Care No     Exercise No     Bike Helmet No     Seat Belt Yes     Social History Narrative  "      Family History   Problem Relation Age of Onset     HEART DISEASE Paternal Grandfather      heart attack     CEREBROVASCULAR DISEASE Paternal Grandfather      Alzheimer Disease Paternal Grandfather      HEART DISEASE Maternal Grandmother      heart attack     DIABETES Maternal Grandmother      adult onset     Alzheimer Disease Father      snores     Alzheimer Disease Paternal Grandmother      Hypertension Mother      DIABETES Mother           ROS: 10 point ROS neg other than the symptoms noted above in the HPI.    Vital Signs: Temp 97  F (36.1  C)  Ht 6' 1\" (1.854 m)  Wt (!) 397 lb (180.1 kg)  BMI 52.38 kg/m2    Examination:  Constitutional:  Alert, well nourished, NAD.  HEENT: Normocephalic, atraumatic.   Pulmonary:  Without shortness of breath, normal effort.   Lymph: no lymphadenopathy to low back or LE.   Integumentary: Skin is free of rashes or lesions.   Cardiovascular:  No pitting edema of BLE.    Psych: Normal affect, no apparent distress    Neurological:  Awake  Alert  Oriented x 3  Speech clear  Cranial nerves II - XII grossly intact  Motor exam   Shoulder Abduction:  Right:  5/5   Left:  5/5  Biceps:                      Right:  5/5   Left:  5/5  Triceps:                     Right:  5/5   Left:  5/5  Wrist Extensors:       Right:  5/5   Left:  5/5  Wrist Flexors:           Right:  5/5   Left:  5/5  Intrinsics:                   Right:  5/5   Left:  5/5  Strength is overall normal in the upper extremities, but  strength does seem to be slightly decreased on the left  Sensation normal to bilateral upper and lower extremities.    Reflexes are 2+ in the brachial radialis and triceps. Negative Paty sign bilaterally.    Musculoskeletal:  Gait: Able to stand from a seated position. Normal non-antalgic, non-myelopathic gait.  Able to heel/toe walk without loss of balance  Cervical examination reveals good range of motion.  No tenderness to palpation of the cervical spine or paraspinous muscles " bilaterally.    Imaging:   MRI of the cervical spine from [facility] was reviewed in the office today.  It demonstrates multiple levels of disc degeneration, with left paracentral disc herniation at C6-7.  There is also disc herniation at C7-T1.  Both of these contribute to bilateral foraminal stenosis.    Assessment/Plan:     Cervicalgia  Cervical disc herniation C6-7 and C7-T1  I lateral upper extremity paresthesias      Sharif Giraldo is a 52 year old male.  I did have a discussion with the patient and his wife regarding his symptoms.  He understands the findings described in his MRI above.  He has multiple levels of disc herniation at C6-7 and C7-T1, with associated radicular left worse than right arm pain.  He has just begun physical therapy.  He has not had any injections.  I would like to get him set up for a translaminar epidural injection at C7-T1 with Dr. Andrade.  I will have the patient follow-up with Dr. Minor after the injection.  He voiced agreement and understanding.          Gustavo Bosch PA-C  [facility]  [address] [address]    Tel [phone]  Pager [phone]

## 2018-03-23 NOTE — PROGRESS NOTES
"Sharif Giraldo is a 52 year old male who presents for:  Chief Complaint   Patient presents with     Consult     c spine pain per Dr. Zapata         Initial Vitals:  Temp 97  F (36.1  C)  Ht 6' 1\" (1.854 m)  Wt (!) 397 lb (180.1 kg)  BMI 52.38 kg/m2 Estimated body mass index is 52.38 kg/(m^2) as calculated from the following:    Height as of this encounter: 6' 1\" (1.854 m).    Weight as of this encounter: 397 lb (180.1 kg).. Body surface area is 3.05 meters squared. BP completed using cuff size: NA (Not Taken)  Moderate Pain (4)    Do you feel safe in your environment?  Yes  Do you need any refills today? No    Nursing Comments:         Viridiana Rodriguez    "

## 2018-03-23 NOTE — MR AVS SNAPSHOT
"              After Visit Summary   3/23/2018    Sharif Giraldo    MRN: [id number]           Patient Information     Date Of Birth          1965        Visit Information        Provider Department      3/23/2018 2:10 PM Gustavo Bosch PA-C [facility]         Follow-ups after your visit        Follow-up notes from your care team     Return for f/u with Dr. Minor on Monday at 1120.      Your next 10 appointments already scheduled     Mar 26, 2018  7:30 AM CDT   [facility] with Ruthann Scruggs [facility] ([facility]  )    [address] [place] [address] [phone]              Who to contact     If you have questions or need follow up information about today's clinic visit or your schedule please contact [facility] directly at [phone].  Normal or non-critical lab and imaging results will be communicated to you by MyChart, letter or phone within 4 business days after the clinic has received the results. If you do not hear from us within 7 days, please contact the clinic through MyChart or phone. If you have a critical or abnormal lab result, we will notify you by phone as soon as possible.  Submit refill requests through [employer] or call your pharmacy and they will forward the refill request to us. Please allow 3 business days for your refill to be completed.          Additional Information About Your Visit        MyChart Information     [employer] lets you send messages to your doctor, view your test results, renew your prescriptions, schedule appointments and more. To sign up, go to [email or web address] . Click on \"Log in\" on the left side of the screen, which will take you to the Welcome page. Then click on \"Sign up Now\" on the right side of the page.     You will be asked to enter the access code listed below, as well as some personal information. Please follow the directions to create your username " "and password.     Your access code is: [email or web address]  Expires: 2018 10:53 AM     Your access code will  in 90 days. If you need help or a new code, please call your [place] clinic or [phone].        Care EveryWhere ID     This is your Care EveryWhere ID. This could be used by other organizations to access your [place] medical records  [id number]        Your Vitals Were     Temperature Height BMI (Body Mass Index)             97  F (36.1  C) 6' 1\" (1.854 m) 52.38 kg/m2          Blood Pressure from Last 3 Encounters:   18 118/78   18 110/76   18 138/90    Weight from Last 3 Encounters:   18 (!) 397 lb (180.1 kg)   18 (!) 393 lb (178.3 kg)   18 (!) 395 lb 9.6 oz (179.4 kg)              Today, you had the following     No orders found for display       Primary Care Provider Office Phone # Fax #    Avery Zapata [phone]       [facility] [address] [place] [address]        Equal Access to Services     JULIA ACOSTA AH: Hadii aad ku hadasho Soomaali, waaxda luqadaha, qaybta kaalmada adeegyada, waxay idiin hayyesikan melvineg lottie ladon pang. So [facility] [phone].    ATENCIÓN: Si habla español, tiene a clark disposición servicios gratuitos de asistencia lingüística. Llame al [phone].    We comply with applicable federal civil rights laws and [place] laws. We do not discriminate on the basis of race, color, national origin, age, disability, sex, sexual orientation, or gender identity.            Thank you!     Thank you for choosing [facility]  for your care. Our goal is always to provide you with excellent care. Hearing back from our patients is one way we can continue to improve our services. Please take a few minutes to complete the written survey that you may receive in the mail after your visit with us. Thank you!             Your Updated Medication List - Protect others around you: Learn how to safely " use, store and throw away your medicines at www.disposemymeds.org.          This list is accurate as of 3/23/18  2:34 PM.  Always use your most recent med list.                   Brand Name Dispense Instructions for use Diagnosis    Calcium carb-Vitamin D 500 mg [place]-200 units 500-200 MG-UNIT per tablet    OSCAL with D;Oyster Shell Calcium    1 tablet    Take 600mg daily        Calcium-Magnesium 300-300 MG Tabs      Take 300 mg by mouth daily        FLUoxetine 40 MG capsule    PROzac    30 capsule    TAKE 1 CAPSULE BY MOUTH DAILY    Major depressive disorder, recurrent episode, mild (H)       GLUCOSAMINE 1500 COMPLEX Caps           HYDROcodone-acetaminophen 5-325 MG per tablet    NORCO    30 tablet    Take 1-2 tablets by mouth every 6 hours as needed for moderate to severe pain    Osteoarthritis of spine with radiculopathy, cervical region       lisinopril-hydrochlorothiazide 20-25 MG per tablet    PRINZIDE/ZESTORETIC    90 tablet    TAKE ONE TABLET BY MOUTH EVERY DAY    Essential hypertension with goal blood pressure less than 140/90       Magnesium 400 MG Tabs      Take 400 mg % by mouth 2 times daily        meloxicam 15 MG tablet    MOBIC    90 tablet    Take 1 tablet (15 mg) by mouth daily    Primary osteoarthritis of left knee       order for DME     1 Device    Dispensed 1 Dorsal (Anterior) Night Splint, Size L/XL, with [facility] agreement signed by patient. Angy Garner CMA, November 30, 2015    Tendonitis, Achilles, left, Primary osteoarthritis of left knee       raltegravir 400 MG tablet    ISENTRESS     Take 400 mg by mouth 2 times daily        tadalafil 5 MG tablet    CIALIS    30 tablet    Take 1 tablet (5 mg) by mouth daily Never use with nitroglycerin, terazosin or doxazosin.    Male impotence       testosterone cypionate 200 MG/ML injection    DEPOTESTOTERONE    1 mL    Inject 1 mL (200 mg) into the muscle every 28 days    Hypogonadism male       Vitamin D (Cholecalciferol) 400 UNITS Tabs      Take 400  Units by mouth daily

## 2018-03-23 NOTE — NURSING NOTE
"Chief Complaint   Patient presents with     Consult     c spine pain per Dr. Zapata        Initial Temp 97  F (36.1  C)  Ht 6' 1\" (1.854 m)  Wt (!) 397 lb (180.1 kg)  BMI 52.38 kg/m2 Estimated body mass index is 52.38 kg/(m^2) as calculated from the following:    Height as of this encounter: 6' 1\" (1.854 m).    Weight as of this encounter: 397 lb (180.1 kg).  Medication Reconciliation: complete    BP completed using cuff size: NA (Not Taken)    Melida Rodriguez MA      "

## 2018-03-26 ENCOUNTER — THERAPY VISIT (OUTPATIENT)
Dept: PHYSICAL THERAPY | Facility: CLINIC | Age: 53
End: 2018-03-26
Payer: COMMERCIAL

## 2018-03-26 DIAGNOSIS — M54.2 CERVICAL PAIN: ICD-10-CM

## 2018-03-26 PROCEDURE — 97035 APP MDLTY 1+ULTRASOUND EA 15: CPT | Mod: GP

## 2018-03-26 PROCEDURE — 97110 THERAPEUTIC EXERCISES: CPT | Mod: GP

## 2018-03-26 PROCEDURE — 97012 MECHANICAL TRACTION THERAPY: CPT | Mod: GP

## 2018-03-26 NOTE — PROGRESS NOTES
Subjective:  HPI                    Objective:  System    Physical Exam    General     ROS    Assessment/Plan:    SUBJECTIVE  Subjective changes as noted by pt:   Pt saw MD and learned that he has HNP at C6-7. plans to have cortizone shot. Still has dizziness and c/o spot of pain in upper back region. L arm symptoms are better.      Current pain level: 5/10     Changes in function:  None     Adverse reaction to treatment or activity:  None    OBJECTIVE  Changes in objective findings:  Yes,   Objective: MIn tender at C7 with PA pressure. Good tolerance to standing rows. Cerv exten min limited     ASSESSMENT  Sharif continues to require intervention to meet STG and LTG's: PT  Patient is progressing as expected.  Response to therapy has shown an improvement in  radicular symptoms only  Progress made towards STG/LTG?  None    PLAN  Continue current treatment plan until patient demonstrates readiness to progress to higher level exercises.    PTA/ATC plan:  Will continue with present plan of care.    Please refer to the daily flowsheet for treatment today, total treatment time and time spent performing 1:1 timed codes.

## 2018-03-28 ENCOUNTER — TELEPHONE (OUTPATIENT)
Dept: SURGERY | Facility: CLINIC | Age: 53
End: 2018-03-28

## 2018-03-28 ENCOUNTER — TELEPHONE (OUTPATIENT)
Dept: NEUROSURGERY | Facility: CLINIC | Age: 53
End: 2018-03-28

## 2018-03-28 DIAGNOSIS — M54.12 CERVICAL RADICULOPATHY: Primary | ICD-10-CM

## 2018-03-28 DIAGNOSIS — R42 DIZZINESS: ICD-10-CM

## 2018-03-28 NOTE — TELEPHONE ENCOUNTER
Patient returned call. He would like to be proceed with DILAN. Placed order and will forward to Dr. Andrade's team for scheduling.    Patient also concerned about his dizziness. He did discuss this at appt with Austin GARCIA, but patient is unsure what he should do for this. Will communicate with care team for recommendation.

## 2018-03-28 NOTE — TELEPHONE ENCOUNTER
Patient called to schedule FRANCOIS. Patient is scheduled on 4/26/2018 @ 1130 w/Raymond, arriving @ 1030. Notified patient that he will need a  and H&P.

## 2018-03-28 NOTE — TELEPHONE ENCOUNTER
Reason for Call:  Other appointment and call back    Detailed comments: Patient called asking about the injection/ order and who is to be calling him regarding an appointment for his dizziness.  Looking for orders for these as discussed at last visit.  Please call    Phone Number Patient can be reached at: Home number on file [phone] (home)    Best Time: any    Can we leave a detailed message on this number? YES    Call taken on 3/28/2018 at 9:51 AM by Eunice Schneider

## 2018-03-29 NOTE — TELEPHONE ENCOUNTER
Spoke with care team and they recommend referral to Neurology to evaluate dizziness. Discussed with patient and he's in agreement. Placed referral.     Also reminded patient to follow up with me if symptoms proceed after his DILAN. Patient voiced agreement.

## 2018-04-11 ENCOUNTER — OFFICE VISIT (OUTPATIENT)
Dept: FAMILY MEDICINE | Facility: CLINIC | Age: 53
End: 2018-04-11
Payer: COMMERCIAL

## 2018-04-11 ENCOUNTER — ANESTHESIA EVENT (OUTPATIENT)
Dept: SURGERY | Facility: CLINIC | Age: 53
End: 2018-04-11
Payer: COMMERCIAL

## 2018-04-11 VITALS
HEIGHT: 73 IN | WEIGHT: 315 LBS | TEMPERATURE: 97.8 F | HEART RATE: 104 BPM | OXYGEN SATURATION: 94 % | DIASTOLIC BLOOD PRESSURE: 70 MMHG | SYSTOLIC BLOOD PRESSURE: 110 MMHG | BODY MASS INDEX: 41.75 KG/M2

## 2018-04-11 DIAGNOSIS — Z01.818 PREOP GENERAL PHYSICAL EXAM: Primary | ICD-10-CM

## 2018-04-11 DIAGNOSIS — M54.2 CERVICAL PAIN: ICD-10-CM

## 2018-04-11 PROCEDURE — 99214 OFFICE O/P EST MOD 30 MIN: CPT | Performed by: FAMILY MEDICINE

## 2018-04-11 ASSESSMENT — LIFESTYLE VARIABLES: TOBACCO_USE: 1

## 2018-04-11 NOTE — ANESTHESIA PREPROCEDURE EVALUATION
Anesthesia Evaluation     . Pt has had prior anesthetic.     No history of anesthetic complications          ROS/MED HX    ENT/Pulmonary:     (+)sleep apnea, tobacco use, Current use uses CPAP , . .    Neurologic:  - neg neurologic ROS     Cardiovascular:     (+) hypertension----. : . . . :. . Previous cardiac testing date:results:date: results:ECG reviewed date:2008 results:ECG tracing not on file date: results:          METS/Exercise Tolerance:     Hematologic:  - neg hematologic  ROS       Musculoskeletal:  - neg musculoskeletal ROS       GI/Hepatic:  - neg GI/hepatic ROS       Renal/Genitourinary:  - ROS Renal section negative       Endo:     (+) Obesity, .      Psychiatric:  - neg psychiatric ROS       Infectious Disease:  - neg infectious disease ROS       Malignancy:      - no malignancy   Other:    (+) No chance of pregnancy C-spine cleared: N/A, H/O Chronic Pain,H/O chronic opiod use , no other significant disability   - neg other ROS                 Physical Exam  Normal systems: cardiovascular, pulmonary and dental    Airway   Mallampati: III  TM distance: >3 FB  Neck ROM: limited    Dental     Cardiovascular   Rhythm and rate: regular and normal      Pulmonary    breath sounds clear to auscultation                    Anesthesia Plan      History & Physical Review  History and physical reviewed and following examination; no interval change.    ASA Status:  3 .    NPO Status:  > 6 hours    Plan for MAC with Intravenous and Propofol induction. Maintenance will be TIVA.  Reason for MAC:  Deep or markedly invasive procedure (G8)  PONV prophylaxis:  Ondansetron (or other 5HT-3)       Postoperative Care  Postoperative pain management:  IV analgesics.      Consents  Anesthetic plan, risks, benefits and alternatives discussed with:  Patient.  Use of blood products discussed: No .   .                          .

## 2018-04-11 NOTE — MR AVS SNAPSHOT
After Visit Summary   4/11/2018    Sharif Giraldo    MRN: [id number]           Patient Information     Date Of Birth          1965        Visit Information        Provider Department      4/11/2018 4:20 PM Avery Zapata MD [facility]        Today's Diagnoses     Preop general physical exam    -  1    Cervical pain          Care Instructions      Before Your Surgery      Call your surgeon if there is any change in your health. This includes signs of a cold or flu (such as a sore throat, runny nose, cough, rash or fever).    Do not smoke, drink alcohol or take over the counter medicine (unless your surgeon or primary care doctor tells you to) for the 24 hours before and after surgery.    If you take prescribed drugs: Follow your doctor s orders about which medicines to take and which to stop until after surgery.    Eating and drinking prior to surgery: follow the instructions from your surgeon    Take a shower or bath the night before surgery. Use the soap your surgeon gave you to gently clean your skin. If you do not have soap from your surgeon, use your regular soap. Do not shave or scrub the surgery site.  Wear clean pajamas and have clean sheets on your bed.     Before Your Surgery      Call your surgeon if there is any change in your health. This includes signs of a cold or flu (such as a sore throat, runny nose, cough, rash or fever).    Do not smoke, drink alcohol or take over the counter medicine (unless your surgeon or primary care doctor tells you to) for the 24 hours before and after surgery.    If you take prescribed drugs: Follow your doctor s orders about which medicines to take and which to stop until after surgery.    Eating and drinking prior to surgery: follow the instructions from your surgeon    Take a shower or bath the night before surgery. Use the soap your surgeon gave you to gently clean your skin. If you do not have soap from your surgeon, use your  "regular soap. Do not shave or scrub the surgery site.  Wear clean pajamas and have clean sheets on your bed.           Follow-ups after your visit        Your next 10 appointments already scheduled     Apr 12, 2018   Procedure with Phong Andrade MD   [facility] ([facility])    [address] [place] [address]   [phone]           From [address]: Exit at [address] on south side of [place]. Turn right on [address]. Turn left at stoplight on [address]. [facility] will be in view two blocks ahead              Who to contact     If you have questions or need follow up information about today's clinic visit or your schedule please contact [facility] directly at [phone].  Normal or non-critical lab and imaging results will be communicated to you by Souqal[employer], letter or phone within 4 business days after the clinic has received the results. If you do not hear from us within 7 days, please contact the clinic through Souqalmalhart or phone. If you have a critical or abnormal lab result, we will notify you by phone as soon as possible.  Submit refill requests through [employer] or call your pharmacy and they will forward the refill request to us. Please allow 3 business days for your refill to be completed.          Additional Information About Your Visit        [employer] Information     [employer] lets you send messages to your doctor, view your test results, renew your prescriptions, schedule appointments and more. To sign up, go to [email or web address] . Click on \"Log in\" on the left side of the screen, which will take you to the Welcome page. Then click on \"Sign up Now\" on the right side of the page.     You will be asked to enter the access code listed below, as well as some personal information. Please follow the directions to create your username and password.     Your access code is: [email or web address]  Expires: 7/10/2018  4:49 PM   " "  Your access code will  in 90 days. If you need help or a new code, please call your [facility] clinic or [phone].        Care EveryWhere ID     This is your Care EveryWhere ID. This could be used by other organizations to access your [facility] medical records  [id number]        Your Vitals Were     Pulse Temperature Height Pulse Oximetry BMI (Body Mass Index)       104 97.8  F (36.6  C) (Temporal) 6' 1\" (1.854 m) 94% 52.31 kg/m2        Blood Pressure from Last 3 Encounters:   18 110/70   18 118/78   18 110/76    Weight from Last 3 Encounters:   18 (!) 396 lb 8 oz (179.9 kg)   18 (!) 397 lb (180.1 kg)   18 (!) 393 lb (178.3 kg)              Today, you had the following     No orders found for display       Primary Care Provider Office Phone # Fax #    Avery Zapata [phone]       [address] [place] [address]        Equal Access to Services     [facility] : Hadii arlette silva hadasho Somarieali, waaxda luqadaha, qaybta kaalmada adefredyyademetrius, brian gramajo . So [facility] [phone].    ATENCIÓN: Si habla español, tiene a clark disposición servicios gratuitos de asistencia lingüística. [facility] [phone].    We comply with applicable federal civil rights laws and [place] laws. We do not discriminate on the basis of race, color, national origin, age, disability, sex, sexual orientation, or gender identity.            Thank you!     Thank you for choosing [facility]  for your care. Our goal is always to provide you with excellent care. Hearing back from our patients is one way we can continue to improve our services. Please take a few minutes to complete the written survey that you may receive in the mail after your visit with us. Thank you!             Your Updated Medication List - Protect others around you: Learn how to safely use, store and throw away your medicines at www.[email or web address].        "   This list is accurate as of 4/11/18  4:49 PM.  Always use your most recent med list.                   Brand Name Dispense Instructions for use Diagnosis    Calcium carb-Vitamin D 500 mg [place]-200 units 500-200 MG-UNIT per tablet    OSCAL with D;Oyster Shell Calcium    1 tablet    Take 600mg daily        Calcium-Magnesium 300-300 MG Tabs      Take 300 mg by mouth daily        FLUoxetine 40 MG capsule    PROzac    30 capsule    TAKE 1 CAPSULE BY MOUTH DAILY    Major depressive disorder, recurrent episode, mild (H)       GLUCOSAMINE 1500 COMPLEX Caps           HYDROcodone-acetaminophen 5-325 MG per tablet    NORCO    30 tablet    Take 1-2 tablets by mouth every 6 hours as needed for moderate to severe pain    Osteoarthritis of spine with radiculopathy, cervical region       lisinopril-hydrochlorothiazide 20-25 MG per tablet    PRINZIDE/ZESTORETIC    90 tablet    TAKE ONE TABLET BY MOUTH EVERY DAY    Essential hypertension with goal blood pressure less than 140/90       Magnesium 400 MG Tabs      Take 400 mg % by mouth 2 times daily        meloxicam 15 MG tablet    MOBIC    90 tablet    Take 1 tablet (15 mg) by mouth daily    Primary osteoarthritis of left knee       order for DME     1 Device    Dispensed 1 Dorsal (Anterior) Night Splint, Size L/XL, with [facility] agreement signed by patient. Angy Garner [facility], November 30, 2015    Tendonitis, Achilles, left, Primary osteoarthritis of left knee       raltegravir 400 MG tablet    ISENTRESS     Take 400 mg by mouth 2 times daily        tadalafil 5 MG tablet    CIALIS    30 tablet    Take 1 tablet (5 mg) by mouth daily Never use with nitroglycerin, terazosin or doxazosin.    Male impotence       testosterone cypionate 200 MG/ML injection    DEPOTESTOTERONE    1 mL    Inject 1 mL (200 mg) into the muscle every 28 days    Hypogonadism male       Vitamin D (Cholecalciferol) 400 units Tabs      Take 400 Units by mouth daily

## 2018-04-11 NOTE — PROGRESS NOTES
[address] [address]  [phone]  Dept: [phone]    PRE-OP EVALUATION:  Today's date: 2018    Sharif Giraldo (: 1965) presents for pre-operative evaluation assessment as requested by Dr. Andrade.  He requires evaluation and anesthesia risk assessment prior to undergoing surgery/procedure for treatment of cervical pain .    Proposed Surgery/ Procedure: Inject epidural cervical   Date of Surgery/ Procedure: 2018  Time of Surgery/ Procedure: 930  Hospital/Surgical Facility: [facility]  Primary Physician: Avery Zapata  Type of Anesthesia Anticipated: Monitor anesthesia care    Patient has a Health Care Directive or Living Will:  NO    1. NO - Do you have a history of heart attack, stroke, stent, bypass or surgery on an artery in the head, neck, heart or legs?  2. NO - Do you ever have any pain or discomfort in your chest?  3. NO - Do you have a history of  Heart Failure?  4. NO - Are you troubled by shortness of breath when: walking on the level, up a slight hill or at night?  5. NO - Do you currently have a cold, bronchitis or other respiratory infection?  6. NO - Do you have a cough, shortness of breath or wheezing?  7. NO - Do you sometimes get pains in the calves of your legs when you walk?  8. NO - Do you or anyone in your family have previous history of blood clots?  9. NO - Do you or does anyone in your family have a serious bleeding problem such as prolonged bleeding following surgeries or cuts?  10. YES - Have you ever had problems with anemia or been told to take iron pills?-Used to take iron.  11. NO - Have you had any abnormal blood loss such as black, tarry or bloody stools, or abnormal vaginal bleeding?  12. NO - Have you ever had a blood transfusion?  13. NO - Have you or any of your relatives ever had problems with anesthesia?  14. YES - Do you have sleep apnea, excessive snoring or daytime drowsiness?-Sleep apnea  15. NO -  Do you have any prosthetic heart valves?  16. NO - Do you have prosthetic joints?  17. NO - Is there any chance that you may be pregnant?      HPI:     HPI related to upcoming procedure: Neck pain with radiculopathy and MRI showing lower cervical spine disc herniation and stenosis.          MEDICAL HISTORY:        Past Medical History:   Diagnosis Date     HTN (hypertension) 2012     Morbid obesity with BMI of 60.0-69.9, adult (H)      LUH (obstructive sleep apnea) 5/2013    Severe      Past Surgical History:   Procedure Laterality Date     C AFF EYE MUSCLE SURG PROC UNLISTED  1970,1974     C ANESTH,REPAIR LO ABD HERNIA NOS  2000,2001    umbilical area     COLONOSCOPY  9/20/2013    Procedure: COLONOSCOPY;  Colonoscopy;  Surgeon: Avery Morales MD;  Location: [facility]     HC EXCISE EXCESS SKIN TISSUE,OTHER      Hx; staph complications of hernia surg     HC KNEE SCOPE, DIAGNOSTIC  1999,2000    Arthroscopy, Knee     HC REMOVAL OF TONSILS,<13 Y/O  1973    Tonsils <10y.o.     HC REPAIR INITIAL INCISIONAL HERNIA; INCARCERATED OR STRANGULATED  09/25/2002    Mesh repair of incarcerated incisional hernia, recurrent.     HERNIA REPAIR, INCISIONAL  10/09/08    Ventral herniorrhaphy/mesh placement.  Excision left elbow mass.     Current Outpatient Prescriptions   Medication Sig Dispense Refill     HYDROcodone-acetaminophen (NORCO) 5-325 MG per tablet Take 1-2 tablets by mouth every 6 hours as needed for moderate to severe pain 30 tablet 0     Calcium-Magnesium 300-300 MG TABS Take 300 mg by mouth daily       Vitamin D, Cholecalciferol, 400 UNITS TABS Take 400 Units by mouth daily       raltegravir (ISENTRESS) 400 MG tablet Take 400 mg by mouth 2 times daily       lisinopril-hydrochlorothiazide (PRINZIDE/ZESTORETIC) 20-25 MG per tablet TAKE ONE TABLET BY MOUTH EVERY DAY 90 tablet 2     testosterone cypionate (DEPOTESTOTERONE) 200 MG/ML injection Inject 1 mL (200 mg) into the muscle every 28 days 1 mL 5     Magnesium 400  MG TABS Take 400 mg % by mouth 2 times daily       meloxicam (MOBIC) 15 MG tablet Take 1 tablet (15 mg) by mouth daily 90 tablet 3     FLUoxetine (PROZAC) 40 MG capsule TAKE 1 CAPSULE BY MOUTH DAILY 30 capsule 1     order for DME Dispensed 1 Dorsal (Anterior) Night Splint, Size L/XL, with [facility] agreement signed by patient. Angy ROSY Garner, November 30, 2015 1 Device 0     calcium carb 1250 mg, 500 mg [place],/vitamin D 200 units (OSCAL WITH D) 500-200 MG-UNIT per tablet Take 600mg daily 1 tablet      Glucosamine-Chondroit-Vit C-Mn (GLUCOSAMINE 1500 COMPLEX) CAPS        tadalafil (CIALIS) 5 MG tablet Take 1 tablet (5 mg) by mouth daily Never use with nitroglycerin, terazosin or doxazosin. 30 tablet 1     OTC products: NSAIDS    Allergies   Allergen Reactions     No Known Drug Allergies       Latex Allergy: NO    Social History   Substance Use Topics     Smoking status: Current Every Day Smoker     Packs/day: 0.01     Years: 10.00     Types: Cigars     Smokeless tobacco: Never Used      Comment:  cigars 2/ day     Alcohol use 0.0 oz/week     0 Standard drinks or equivalent per week      Comment: 1 a week      History   Drug Use No       REVIEW OF SYSTEMS:   CONSTITUTIONAL: NEGATIVE for fever, chills, change in weight complains of some vertigo  INTEGUMENTARY/SKIN: NEGATIVE for worrisome rashes, moles or lesions  ENT/MOUTH: NEGATIVE for ear, mouth and throat problems  RESP: NEGATIVE for significant cough or SOB  CV: NEGATIVE for chest pain, palpitations or peripheral edema  GI: NEGATIVE for nausea, abdominal pain, heartburn, or change in bowel habits    EXAM:   There were no vitals taken for this visit.    GENERAL APPEARANCE: healthy, alert and no distress     EYES: EOMI,  PERRL     HENT: ear canals and TM's normal and nose and mouth without ulcers or lesions     NECK: no adenopathy, no asymmetry, masses, or scars and thyroid normal to palpation     RESP: lungs clear to auscultation - no rales, rhonchi or wheezes     CV:  regular rates and rhythm, normal S1 S2, no S3 or S4 and no murmur, click or rub     ABDOMEN:  soft, nontender, no HSM or masses and bowel sounds normal     MS: extremities normal- no gross deformities noted, no evidence of inflammation in joints, FROM in all extremities.     SKIN: no suspicious lesions or rashes     PSYCH: mentation appears normal. and affect normal/bright     LYMPHATICS: No cervical adenopathy    DIAGNOSTICS:   No labs or EKG required for low risk surgery (cataract, skin procedure, breast biopsy, etc)    Recent Labs   Lab Test  01/04/18   0957  10/05/17   0823  09/23/16   0839  09/17/15   1013   06/09/11   1549   HGB  13.8   --   12.9*  12.4*   < >   --    PLT   --    --   222  235   < >   --    NA   --   144  139  137   < >   --    POTASSIUM   --   4.2  4.1  3.7   < >   --    CR   --   0.72  0.70  0.54*   < >   --    A1C   --    --    --    --    --   5.5    < > = values in this interval not displayed.        IMPRESSION:   Reason for surgery/procedure: Cervical disc herniation and foraminal stenosis    The proposed surgical procedure is considered LOW risk.    REVISED CARDIAC RISK INDEX  The patient has the following serious cardiovascular risks for perioperative complications such as (MI, PE, VFib and 3  AV Block):  No serious cardiac risks  INTERPRETATION: 0 risks: Class I (very low risk - 0.4% complication rate)    The patient has the following additional risks for perioperative complications:  No identified additional risks      ICD-10-CM    1. Preop general physical exam Z01.818        RECOMMENDATIONS:         --Patient is to take all scheduled medications on the day of surgery EXCEPT for modifications listed below.    APPROVAL GIVEN to proceed with proposed procedure, without further diagnostic evaluation       Signed Electronically by: Avery Zapata MD    Copy of this evaluation report is provided to requesting physician.    [facility] Preop Guidelines    Revised Cardiac Risk Index

## 2018-04-11 NOTE — NURSING NOTE
"Chief Complaint   Patient presents with     Pre-Op Exam     Inject epidural cervical       Initial /70 (BP Location: Right arm, Patient Position: Chair, Cuff Size: Adult Large)  Pulse 104  Temp 97.8  F (36.6  C) (Temporal)  Ht 6' 1\" (1.854 m)  Wt (!) 396 lb 8 oz (179.9 kg)  SpO2 94%  BMI 52.31 kg/m2 Estimated body mass index is 52.31 kg/(m^2) as calculated from the following:    Height as of this encounter: 6' 1\" (1.854 m).    Weight as of this encounter: 396 lb 8 oz (179.9 kg).  Medication Reconciliation: complete     "

## 2018-04-12 ENCOUNTER — ANESTHESIA (OUTPATIENT)
Dept: SURGERY | Facility: CLINIC | Age: 53
End: 2018-04-12
Payer: COMMERCIAL

## 2018-04-12 ENCOUNTER — HOSPITAL ENCOUNTER (OUTPATIENT)
Dept: GENERAL RADIOLOGY | Facility: CLINIC | Age: 53
End: 2018-04-12
Attending: ANESTHESIOLOGY | Admitting: ANESTHESIOLOGY
Payer: COMMERCIAL

## 2018-04-12 ENCOUNTER — HOSPITAL ENCOUNTER (OUTPATIENT)
Facility: CLINIC | Age: 53
Discharge: HOME | End: 2018-04-12
Attending: ANESTHESIOLOGY | Admitting: ANESTHESIOLOGY
Payer: COMMERCIAL

## 2018-04-12 VITALS
SYSTOLIC BLOOD PRESSURE: 125 MMHG | RESPIRATION RATE: 18 BRPM | TEMPERATURE: 98.2 F | DIASTOLIC BLOOD PRESSURE: 82 MMHG | OXYGEN SATURATION: 97 % | HEART RATE: 80 BPM

## 2018-04-12 DIAGNOSIS — M54.12 CERVICAL RADICULOPATHY: ICD-10-CM

## 2018-04-12 PROCEDURE — 62321 NJX INTERLAMINAR CRV/THRC: CPT | Performed by: ANESTHESIOLOGY

## 2018-04-12 PROCEDURE — 25000128 H RX IP 250 OP 636: Performed by: ANESTHESIOLOGY

## 2018-04-12 PROCEDURE — 40000277 XR FLUORO NEEDLE PLACEMENT SPINE (WITH PROCEDURE)

## 2018-04-12 PROCEDURE — 25000128 H RX IP 250 OP 636: Performed by: NURSE ANESTHETIST, CERTIFIED REGISTERED

## 2018-04-12 PROCEDURE — 37000008 ZZH ANESTHESIA TECHNICAL FEE, 1ST 30 MIN: Performed by: ANESTHESIOLOGY

## 2018-04-12 RX ORDER — PROPOFOL 10 MG/ML
INJECTION, EMULSION INTRAVENOUS PRN
Status: DISCONTINUED | OUTPATIENT
Start: 2018-04-12 | End: 2018-04-12

## 2018-04-12 RX ORDER — TRIAMCINOLONE ACETONIDE 40 MG/ML
INJECTION, SUSPENSION INTRA-ARTICULAR; INTRAMUSCULAR PRN
Status: DISCONTINUED | OUTPATIENT
Start: 2018-04-12 | End: 2018-04-12 | Stop reason: HOSPADM

## 2018-04-12 RX ORDER — SODIUM CHLORIDE, SODIUM LACTATE, POTASSIUM CHLORIDE, CALCIUM CHLORIDE 600; 310; 30; 20 MG/100ML; MG/100ML; MG/100ML; MG/100ML
INJECTION, SOLUTION INTRAVENOUS CONTINUOUS
Status: DISCONTINUED | OUTPATIENT
Start: 2018-04-12 | End: 2018-04-12 | Stop reason: HOSPADM

## 2018-04-12 RX ORDER — NALOXONE HYDROCHLORIDE 0.4 MG/ML
.1-.4 INJECTION, SOLUTION INTRAMUSCULAR; INTRAVENOUS; SUBCUTANEOUS
Status: DISCONTINUED | OUTPATIENT
Start: 2018-04-12 | End: 2018-04-12 | Stop reason: HOSPADM

## 2018-04-12 RX ORDER — LIDOCAINE 40 MG/G
CREAM TOPICAL
Status: DISCONTINUED | OUTPATIENT
Start: 2018-04-12 | End: 2018-04-12 | Stop reason: HOSPADM

## 2018-04-12 RX ORDER — FENTANYL CITRATE 50 UG/ML
25-50 INJECTION, SOLUTION INTRAMUSCULAR; INTRAVENOUS
Status: DISCONTINUED | OUTPATIENT
Start: 2018-04-12 | End: 2018-04-12 | Stop reason: HOSPADM

## 2018-04-12 RX ORDER — ONDANSETRON 2 MG/ML
4 INJECTION INTRAMUSCULAR; INTRAVENOUS EVERY 30 MIN PRN
Status: DISCONTINUED | OUTPATIENT
Start: 2018-04-12 | End: 2018-04-12 | Stop reason: HOSPADM

## 2018-04-12 RX ORDER — ONDANSETRON 4 MG/1
4 TABLET, ORALLY DISINTEGRATING ORAL EVERY 30 MIN PRN
Status: DISCONTINUED | OUTPATIENT
Start: 2018-04-12 | End: 2018-04-12 | Stop reason: HOSPADM

## 2018-04-12 RX ORDER — IOPAMIDOL 612 MG/ML
INJECTION, SOLUTION INTRATHECAL PRN
Status: DISCONTINUED | OUTPATIENT
Start: 2018-04-12 | End: 2018-04-12 | Stop reason: HOSPADM

## 2018-04-12 RX ORDER — MEPERIDINE HYDROCHLORIDE 25 MG/ML
12.5 INJECTION INTRAMUSCULAR; INTRAVENOUS; SUBCUTANEOUS
Status: DISCONTINUED | OUTPATIENT
Start: 2018-04-12 | End: 2018-04-12 | Stop reason: HOSPADM

## 2018-04-12 RX ADMIN — PROPOFOL 50 MG: 10 INJECTION, EMULSION INTRAVENOUS at 09:57

## 2018-04-12 RX ADMIN — PROPOFOL 40 MG: 10 INJECTION, EMULSION INTRAVENOUS at 10:00

## 2018-04-12 NOTE — ANESTHESIA POSTPROCEDURE EVALUATION
Patient: Sharif Giraldo    Procedure(s):  translaminar epidural injection at cervical 7 - throacic 1 - Wound Class: I-Clean    Diagnosis:cervical radiculopathy  Diagnosis Additional Information: No value filed.    Anesthesia Type:  MAC    Note:  Anesthesia Post Evaluation    Patient location during evaluation: Phase 2  Patient participation: Able to fully participate in evaluation  Level of consciousness: awake and alert  Pain management: adequate  Airway patency: patent  Cardiovascular status: acceptable  Respiratory status: acceptable  Hydration status: acceptable  PONV: none     Anesthetic complications: None    Comments: Pt was happy with his care today. VSS. No complications noted. DC to home with wife        Last vitals:  Vitals:    04/12/18 0850 04/12/18 1022   BP: 128/73 125/82   Pulse: 82 80   Resp: 18 18   Temp: 98.2  F (36.8  C)    SpO2: 97% 97%         Electronically Signed By: SHERITA Domingo CRNA  April 12, 2018  11:26 AM

## 2018-04-12 NOTE — DISCHARGE INSTRUCTIONS
Home Care Instructions                Procedure:  Epidural Steroid Injection or Joint injection    Activity:    Rest today    Do not work today    Resume normal activity tomorrow    Pain:    You may experience soreness at the injection site for one or two days    You may use an ice pack for 20 minutes every 2 hours for the first 24 hours    You may use a heating pad after the first 24 hours    You may use Tylenol  (acetaminophen) every 4 hours or other pain medicines as directed by your physician    Safety  Sedation medicine, if given may remain active for many hours.    It is important for the next 24 hours that you do not:    Drive a car    Operate machines or power tools    Consume alcohol, including beer    Sign any important papers or legal documents    You may experience numbness radiating into your legs or arms, (depending on the procedure location)  This numbness may last several hours.  Until the numb sensation returns to normal please use caution in walking, climbing stairs, stepping out of your vehicle, etc.    Common side effects of steroids:  Not everyone will experience corticosteroid side effects. If side effects are experienced they will gradually subside in the 7-10 day period following an injection.    Most common side effects include:    Flushed face and/or chest    Feeling of warmth, particularly in face but could be overall feeling of warmth    Increased blood sugar in diabetic patients    Menstrual irregularities may occur.  If taking hormone based birth control an alternate method of birth control is recommended    Sleep disturbances and/or mood swings are possible    Leg cramps    Please contact us if you have:  Severe pain   Fever more than 101.5 degrees Fahrenheit  Signs of infection (redness, swelling or drainage)      If you have questions during normal business hours (8am-5pm Monday-Friday) contact the [facility] at [phone]. If you need help after hours, we recommend that  you go to a hospital emergency room or dial 911.

## 2018-04-12 NOTE — IP AVS SNAPSHOT
MRN:[id number]                      After Visit Summary   4/12/2018    Sharif Giraldo    MRN: [id number]           Thank you!     Thank you for choosing [facility] for your care. Our goal is always to provide you with excellent care. Hearing back from our patients is one way we can continue to improve our services. Please take a few minutes to complete the written survey that you may receive in the mail after you visit with us. Thank you!        Patient Information     Date Of Birth          1965        About your hospital stay     You were admitted on:  April 12, 2018 You last received care in the:  [facility] Phase II    You were discharged on:  April 12, 2018       Who to Call     For medical emergencies, please call 911.  For non-urgent questions about your medical care, please call your primary care provider or clinic, [phone]  For questions related to your surgery, please call your surgery clinic        Attending Provider     Provider Specialty    Phong Andrade [facility]       Primary Care Provider Office Phone # Fax #    Avery Zapata [phone]      After Care Instructions     Discharge Instructions       Review outpatient procedure discharge instructions as directed by Provider.                  Further instructions from your care team       Home Care Instructions                Procedure:  Epidural Steroid Injection or Joint injection    Activity:    Rest today    Do not work today    Resume normal activity tomorrow    Pain:    You may experience soreness at the injection site for one or two days    You may use an ice pack for 20 minutes every 2 hours for the first 24 hours    You may use a heating pad after the first 24 hours    You may use Tylenol  (acetaminophen) every 4 hours or other pain medicines as directed by your physician    Safety  Sedation medicine, if given may remain active for many hours.    It is important for the next 24  hours that you do not:    Drive a car    Operate machines or power tools    Consume alcohol, including beer    Sign any important papers or legal documents    You may experience numbness radiating into your legs or arms, (depending on the procedure location)  This numbness may last several hours.  Until the numb sensation returns to normal please use caution in walking, climbing stairs, stepping out of your vehicle, etc.    Common side effects of steroids:  Not everyone will experience corticosteroid side effects. If side effects are experienced they will gradually subside in the 7-10 day period following an injection.    Most common side effects include:    Flushed face and/or chest    Feeling of warmth, particularly in face but could be overall feeling of warmth    Increased blood sugar in diabetic patients    Menstrual irregularities may occur.  If taking hormone based birth control an alternate method of birth control is recommended    Sleep disturbances and/or mood swings are possible    Leg cramps    Please contact us if you have:  Severe pain   Fever more than 101.5 degrees Fahrenheit  Signs of infection (redness, swelling or drainage)      If you have questions during normal business hours (8am-5pm Monday-Friday) contact the [facility] at [phone]. If you need help after hours, we recommend that you go to a hospital emergency room or dial 911.                 Pending Results     No orders found from 4/10/2018 to 4/13/2018.            Admission Information     Date & Time Provider Department Dept. Phone    4/12/2018 Phong Andrade MD [facility] Phase [phone]      Your Vitals Were     Blood Pressure Pulse Temperature Respirations Pulse Oximetry       125/82 80 98.2  F (36.8  C) (Oral) 18 97%       MyChart Information     MyChart lets you send messages to your doctor, view your test results, renew your prescriptions, schedule appointments and more. To sign up, go to  [email or web address] . Click on \"Log in\" on the left side of the screen, which will take you to the Welcome page. Then click on \"Sign up Now\" on the right side of the page.     You will be asked to enter the access code listed below, as well as some personal information. Please follow the directions to create your username and password.     Your access code is: [email or web address]  Expires: 7/10/2018  4:49 PM     Your access code will  in 90 days. If you need help or a new code, please call your [place] clinic or [phone].        Care EveryWhere ID     This is your Care EveryWhere ID. This could be used by other organizations to access your [place] medical records  [id number]        Equal Access to Services     JULIA ACOSTA : Delmar Guerra, kallie fu, angi umana, brian gramajo . So [facility] [phone].    ATENCIÓN: Si habla español, tiene a clark disposición servicios gratuitos de asistencia lingüística. Natacha al [phone].    We comply with applicable federal civil rights laws and [place] laws. We do not discriminate on the basis of race, color, national origin, age, disability, sex, sexual orientation, or gender identity.               Review of your medicines      CONTINUE these medicines which have NOT CHANGED        Dose / Directions    Calcium carb-Vitamin D 500 mg [place]-200 units 500-200 MG-UNIT per tablet   Commonly known as:  OSCAL with D;Oyster Shell Calcium        Take 600mg daily   Quantity:  1 tablet   Refills:  0       Calcium-Magnesium 300-300 MG Tabs        Dose:  300 mg   Take 300 mg by mouth daily   Refills:  0       FLUoxetine 40 MG capsule   Commonly known as:  PROzac   Used for:  Major depressive disorder, recurrent episode, mild (H)        TAKE 1 CAPSULE BY MOUTH DAILY   Quantity:  30 capsule   Refills:  1       GLUCOSAMINE 1500 COMPLEX Caps        Refills:  0       HYDROcodone-acetaminophen 5-325 MG per tablet   Commonly " known as:  NORCO   Used for:  Osteoarthritis of spine with radiculopathy, cervical region        Dose:  1-2 tablet   Take 1-2 tablets by mouth every 6 hours as needed for moderate to severe pain   Quantity:  30 tablet   Refills:  0       lisinopril-hydrochlorothiazide 20-25 MG per tablet   Commonly known as:  PRINZIDE/ZESTORETIC   Used for:  Essential hypertension with goal blood pressure less than 140/90        TAKE ONE TABLET BY MOUTH EVERY DAY   Quantity:  90 tablet   Refills:  2       Magnesium 400 MG Tabs        Dose:  400 mg %   Take 400 mg % by mouth 2 times daily   Refills:  0       meloxicam 15 MG tablet   Commonly known as:  MOBIC   Used for:  Primary osteoarthritis of left knee        Dose:  15 mg   Take 1 tablet (15 mg) by mouth daily   Quantity:  90 tablet   Refills:  3       order for DME   Used for:  Tendonitis, Achilles, left, Primary osteoarthritis of left knee        Dispensed 1 Dorsal (Anterior) Night Splint, Size L/XL, with [facility] agreement signed by patient. Angy Garner [facility], November 30, 2015   Quantity:  1 Device   Refills:  0       raltegravir 400 MG tablet   Commonly known as:  ISENTRESS        Dose:  400 mg   Take 400 mg by mouth 2 times daily   Refills:  0       tadalafil 5 MG tablet   Commonly known as:  CIALIS   Used for:  Male impotence        Dose:  5 mg   Take 1 tablet (5 mg) by mouth daily Never use with nitroglycerin, terazosin or doxazosin.   Quantity:  30 tablet   Refills:  1       testosterone cypionate 200 MG/ML injection   Commonly known as:  DEPOTESTOTERONE   Used for:  Hypogonadism male        Dose:  200 mg   Inject 1 mL (200 mg) into the muscle every 28 days   Quantity:  1 mL   Refills:  5       Vitamin D (Cholecalciferol) 400 units Tabs        Dose:  400 Units   Take 400 Units by mouth daily   Refills:  0                Protect others around you: Learn how to safely use, store and throw away your medicines at www.disposemymeds.org.             Medication List: This is a  list of all your medications and when to take them. Check marks below indicate your daily home schedule. Keep this list as a reference.      Medications           Morning Afternoon Evening Bedtime As Needed    Calcium carb-Vitamin D 500 mg [place]-200 units 500-200 MG-UNIT per tablet   Commonly known as:  OSCAL with D;Oyster Shell Calcium   Take 600mg daily                                Calcium-Magnesium 300-300 MG Tabs   Take 300 mg by mouth daily                                FLUoxetine 40 MG capsule   Commonly known as:  PROzac   TAKE 1 CAPSULE BY MOUTH DAILY                                GLUCOSAMINE 1500 COMPLEX Caps                                HYDROcodone-acetaminophen 5-325 MG per tablet   Commonly known as:  NORCO   Take 1-2 tablets by mouth every 6 hours as needed for moderate to severe pain                                lisinopril-hydrochlorothiazide 20-25 MG per tablet   Commonly known as:  PRINZIDE/ZESTORETIC   TAKE ONE TABLET BY MOUTH EVERY DAY                                Magnesium 400 MG Tabs   Take 400 mg % by mouth 2 times daily                                meloxicam 15 MG tablet   Commonly known as:  MOBIC   Take 1 tablet (15 mg) by mouth daily                                order for DME   Dispensed 1 Dorsal (Anterior) Night Splint, Size L/XL, with [facility] agreement signed by patient. Angy Garner [facility], November 30, 2015                                raltegravir 400 MG tablet   Commonly known as:  ISENTRESS   Take 400 mg by mouth 2 times daily                                tadalafil 5 MG tablet   Commonly known as:  CIALIS   Take 1 tablet (5 mg) by mouth daily Never use with nitroglycerin, terazosin or doxazosin.                                testosterone cypionate 200 MG/ML injection   Commonly known as:  DEPOTESTOTERONE   Inject 1 mL (200 mg) into the muscle every 28 days                                Vitamin D (Cholecalciferol) 400 units Tabs   Take 400 Units by mouth daily

## 2018-04-12 NOTE — H&P (VIEW-ONLY)
[address] [address]  [phone]  Dept: [phone]    PRE-OP EVALUATION:  Today's date: 2018    Sharif Giraldo (: 1965) presents for pre-operative evaluation assessment as requested by Dr. Andrade.  He requires evaluation and anesthesia risk assessment prior to undergoing surgery/procedure for treatment of cervical pain .    Proposed Surgery/ Procedure: Inject epidural cervical   Date of Surgery/ Procedure: 2018  Time of Surgery/ Procedure: 930  Hospital/Surgical Facility: [facility]  Primary Physician: Avery Zapata  Type of Anesthesia Anticipated: Monitor anesthesia care    Patient has a Health Care Directive or Living Will:  NO    1. NO - Do you have a history of heart attack, stroke, stent, bypass or surgery on an artery in the head, neck, heart or legs?  2. NO - Do you ever have any pain or discomfort in your chest?  3. NO - Do you have a history of  Heart Failure?  4. NO - Are you troubled by shortness of breath when: walking on the level, up a slight hill or at night?  5. NO - Do you currently have a cold, bronchitis or other respiratory infection?  6. NO - Do you have a cough, shortness of breath or wheezing?  7. NO - Do you sometimes get pains in the calves of your legs when you walk?  8. NO - Do you or anyone in your family have previous history of blood clots?  9. NO - Do you or does anyone in your family have a serious bleeding problem such as prolonged bleeding following surgeries or cuts?  10. YES - Have you ever had problems with anemia or been told to take iron pills?-Used to take iron.  11. NO - Have you had any abnormal blood loss such as black, tarry or bloody stools, or abnormal vaginal bleeding?  12. NO - Have you ever had a blood transfusion?  13. NO - Have you or any of your relatives ever had problems with anesthesia?  14. YES - Do you have sleep apnea, excessive snoring or daytime drowsiness?-Sleep apnea  15. NO -  Do you have any prosthetic heart valves?  16. NO - Do you have prosthetic joints?  17. NO - Is there any chance that you may be pregnant?      HPI:     HPI related to upcoming procedure: Neck pain with radiculopathy and MRI showing lower cervical spine disc herniation and stenosis.          MEDICAL HISTORY:        Past Medical History:   Diagnosis Date     HTN (hypertension) 2012     Morbid obesity with BMI of 60.0-69.9, adult (H)      LUH (obstructive sleep apnea) 5/2013    Severe      Past Surgical History:   Procedure Laterality Date     C AFF EYE MUSCLE SURG PROC UNLISTED  1970,1974     C ANESTH,REPAIR LO ABD HERNIA NOS  2000,2001    umbilical area     COLONOSCOPY  9/20/2013    Procedure: COLONOSCOPY;  Colonoscopy;  Surgeon: Avery Morales MD;  Location: [facility]     HC EXCISE EXCESS SKIN TISSUE,OTHER      Hx; staph complications of hernia surg     HC KNEE SCOPE, DIAGNOSTIC  1999,2000    Arthroscopy, Knee     HC REMOVAL OF TONSILS,<11 Y/O  1973    Tonsils <10y.o.     HC REPAIR INITIAL INCISIONAL HERNIA; INCARCERATED OR STRANGULATED  09/25/2002    Mesh repair of incarcerated incisional hernia, recurrent.     HERNIA REPAIR, INCISIONAL  10/09/08    Ventral herniorrhaphy/mesh placement.  Excision left elbow mass.     Current Outpatient Prescriptions   Medication Sig Dispense Refill     HYDROcodone-acetaminophen (NORCO) 5-325 MG per tablet Take 1-2 tablets by mouth every 6 hours as needed for moderate to severe pain 30 tablet 0     Calcium-Magnesium 300-300 MG TABS Take 300 mg by mouth daily       Vitamin D, Cholecalciferol, 400 UNITS TABS Take 400 Units by mouth daily       raltegravir (ISENTRESS) 400 MG tablet Take 400 mg by mouth 2 times daily       lisinopril-hydrochlorothiazide (PRINZIDE/ZESTORETIC) 20-25 MG per tablet TAKE ONE TABLET BY MOUTH EVERY DAY 90 tablet 2     testosterone cypionate (DEPOTESTOTERONE) 200 MG/ML injection Inject 1 mL (200 mg) into the muscle every 28 days 1 mL 5     Magnesium 400  MG TABS Take 400 mg % by mouth 2 times daily       meloxicam (MOBIC) 15 MG tablet Take 1 tablet (15 mg) by mouth daily 90 tablet 3     FLUoxetine (PROZAC) 40 MG capsule TAKE 1 CAPSULE BY MOUTH DAILY 30 capsule 1     order for DME Dispensed 1 Dorsal (Anterior) Night Splint, Size L/XL, with [facility] agreement signed by patient. Angy ROSY Garner, November 30, 2015 1 Device 0     calcium carb 1250 mg, 500 mg [place],/vitamin D 200 units (OSCAL WITH D) 500-200 MG-UNIT per tablet Take 600mg daily 1 tablet      Glucosamine-Chondroit-Vit C-Mn (GLUCOSAMINE 1500 COMPLEX) CAPS        tadalafil (CIALIS) 5 MG tablet Take 1 tablet (5 mg) by mouth daily Never use with nitroglycerin, terazosin or doxazosin. 30 tablet 1     OTC products: NSAIDS    Allergies   Allergen Reactions     No Known Drug Allergies       Latex Allergy: NO    Social History   Substance Use Topics     Smoking status: Current Every Day Smoker     Packs/day: 0.01     Years: 10.00     Types: Cigars     Smokeless tobacco: Never Used      Comment:  cigars 2/ day     Alcohol use 0.0 oz/week     0 Standard drinks or equivalent per week      Comment: 1 a week      History   Drug Use No       REVIEW OF SYSTEMS:   CONSTITUTIONAL: NEGATIVE for fever, chills, change in weight complains of some vertigo  INTEGUMENTARY/SKIN: NEGATIVE for worrisome rashes, moles or lesions  ENT/MOUTH: NEGATIVE for ear, mouth and throat problems  RESP: NEGATIVE for significant cough or SOB  CV: NEGATIVE for chest pain, palpitations or peripheral edema  GI: NEGATIVE for nausea, abdominal pain, heartburn, or change in bowel habits    EXAM:   There were no vitals taken for this visit.    GENERAL APPEARANCE: healthy, alert and no distress     EYES: EOMI,  PERRL     HENT: ear canals and TM's normal and nose and mouth without ulcers or lesions     NECK: no adenopathy, no asymmetry, masses, or scars and thyroid normal to palpation     RESP: lungs clear to auscultation - no rales, rhonchi or wheezes     CV:  regular rates and rhythm, normal S1 S2, no S3 or S4 and no murmur, click or rub     ABDOMEN:  soft, nontender, no HSM or masses and bowel sounds normal     MS: extremities normal- no gross deformities noted, no evidence of inflammation in joints, FROM in all extremities.     SKIN: no suspicious lesions or rashes     PSYCH: mentation appears normal. and affect normal/bright     LYMPHATICS: No cervical adenopathy    DIAGNOSTICS:   No labs or EKG required for low risk surgery (cataract, skin procedure, breast biopsy, etc)    Recent Labs   Lab Test  01/04/18   0957  10/05/17   0823  09/23/16   0839  09/17/15   1013   06/09/11   1549   HGB  13.8   --   12.9*  12.4*   < >   --    PLT   --    --   222  235   < >   --    NA   --   144  139  137   < >   --    POTASSIUM   --   4.2  4.1  3.7   < >   --    CR   --   0.72  0.70  0.54*   < >   --    A1C   --    --    --    --    --   5.5    < > = values in this interval not displayed.        IMPRESSION:   Reason for surgery/procedure: Cervical disc herniation and foraminal stenosis    The proposed surgical procedure is considered LOW risk.    REVISED CARDIAC RISK INDEX  The patient has the following serious cardiovascular risks for perioperative complications such as (MI, PE, VFib and 3  AV Block):  No serious cardiac risks  INTERPRETATION: 0 risks: Class I (very low risk - 0.4% complication rate)    The patient has the following additional risks for perioperative complications:  No identified additional risks      ICD-10-CM    1. Preop general physical exam Z01.818        RECOMMENDATIONS:         --Patient is to take all scheduled medications on the day of surgery EXCEPT for modifications listed below.    APPROVAL GIVEN to proceed with proposed procedure, without further diagnostic evaluation       Signed Electronically by: Avery Zapata MD    Copy of this evaluation report is provided to requesting physician.    [facility] Preop Guidelines    Revised Cardiac Risk Index

## 2018-04-12 NOTE — ANESTHESIA CARE TRANSFER NOTE
Patient: Sharif Giraldo    Procedure(s):  translaminar epidural injection at cervical 7 - throacic 1 - Wound Class: I-Clean    Diagnosis: cervical radiculopathy  Diagnosis Additional Information: No value filed.    Anesthesia Type:   MAC     Note:  Airway :Room Air  Patient transferred to:Phase II  Handoff Report: Identifed the Patient, Identified the Reponsible Provider, Reviewed the pertinent medical history, Discussed the surgical course, Reviewed Intra-OP anesthesia mangement and issues during anesthesia, Set expectations for post-procedure period and Allowed opportunity for questions and acknowledgement of understanding      Vitals: (Last set prior to Anesthesia Care Transfer)    CRNA VITALS  4/12/2018 0942 - 4/12/2018 1042      4/12/2018             Pulse: 86    SpO2: 100 %    Resp Rate (observed): 9                Electronically Signed By: SHERITA Domingo CRNA  April 12, 2018  11:32 AM

## 2018-04-12 NOTE — OP NOTE
CHIEF COMPLAINT: Neck pain secondary to cervical spondylosis and cervical disc degeneration  PROCEDURE: C7-T1 Interlaminar epidural steroid injection using fluoroscopic guidance with contrast dye.   PROCEDURE DETAILS: After written informed consent was obtained from the patient, the patient was escorted to the procedure room.  The patient was placed in the prone position.  A  time out  was conducted to verify patient identity, procedure to be performed, side, site, allergies and any special requirements.  The skin over the neck and upper back region were prepped and draped in normal sterile fashion. Fluoroscopy was used to identify the C7-T1 interspace in an AP view and the skin was anesthetized with 2 mL of 1% lidocaine with bicarbonate buffer.  A 20-gauge 3-1/2 inch Tuohy needle was advanced using the loss of resistance technique with preservative free normal saline with fluoroscopic guidance.  Initially I had posterior tissue spread with no spread into the epidural space therefore the needle was removed.  Another difficulty was his body habitus.  I ran out of length from the needle and then I switched over to a 22-gauge 5 inch Quincke needle.  After switching to the longer needle I was able to get loss-of-resistance and was epidural space.  There was mostly flow of contrast to the right epidural gutter.  After negative aspiration for CSF and blood, 1.5 cc of Isovue contrast dye was injected revealing the appropriate cervical epidurogram without evidence of intrathecal or intravascular spread. Following this, a 3-mL solution of 40 mg of triamcinolone with 2 cc preservative-free normal saline was slowly injected.  After injection of the medication, as the needle tip was withdrawn, it was flushed with local anesthetic.  The patient was monitored with blood pressure and pulse oximetry machines with the assistance of an RN throughout the procedure.  The patient was alert and responsive to questions throughout the  procedure.   The patient tolerated the procedure well and was observed in the post-procedural area.  The patient was dismissed without apparent complications.     DIAGNOSIS:  1. Neck pain secondary to cervical spondylosis and cervical disc degeneration  PLAN:  1. Performed a C7-T1 interlaminar epidural steroid injection.   2. The patient was instructed to call [facility] at [phone] in one week with a progress update.      Phong Andrade MD  Diplomate of the American Board of Anesthesiology, Pain Medicine

## 2018-04-12 NOTE — IP AVS SNAPSHOT
[facility] Phase II    [address]     [place] [address]    Phone:  [phone]                                       After Visit Summary   4/12/2018    Sharif Giraldo    MRN: [id number]           After Visit Summary Signature Page     I have received my discharge instructions, and my questions have been answered. I have discussed any challenges I see with this plan with the nurse or doctor.    ..........................................................................................................................................  Patient/Patient Representative Signature      ..........................................................................................................................................  Patient Representative Print Name and Relationship to Patient    ..................................................               ................................................  Date                                            Time    ..........................................................................................................................................  Reviewed by Signature/Title    ...................................................              ..............................................  Date                                                            Time

## 2018-04-23 ENCOUNTER — TRANSFERRED RECORDS (OUTPATIENT)
Dept: HEALTH INFORMATION MANAGEMENT | Facility: CLINIC | Age: 53
End: 2018-04-23

## 2018-04-24 ENCOUNTER — MEDICAL CORRESPONDENCE (OUTPATIENT)
Dept: PHYSICAL THERAPY | Facility: CLINIC | Age: 53
End: 2018-04-24

## 2018-04-26 ENCOUNTER — HOSPITAL ENCOUNTER (OUTPATIENT)
Dept: PHYSICAL THERAPY | Facility: CLINIC | Age: 53
Setting detail: THERAPIES SERIES
End: 2018-04-26
Attending: PSYCHIATRY & NEUROLOGY
Payer: COMMERCIAL

## 2018-04-26 ENCOUNTER — OFFICE VISIT (OUTPATIENT)
Dept: NEUROSURGERY | Facility: CLINIC | Age: 53
End: 2018-04-26
Payer: COMMERCIAL

## 2018-04-26 VITALS
HEIGHT: 73 IN | WEIGHT: 315 LBS | TEMPERATURE: 96.7 F | DIASTOLIC BLOOD PRESSURE: 72 MMHG | BODY MASS INDEX: 41.75 KG/M2 | SYSTOLIC BLOOD PRESSURE: 132 MMHG

## 2018-04-26 DIAGNOSIS — M54.12 CERVICAL RADICULOPATHY: Primary | ICD-10-CM

## 2018-04-26 PROCEDURE — 40000840 ZZHC STATISTIC PT VESTIBULAR VISIT

## 2018-04-26 PROCEDURE — 97110 THERAPEUTIC EXERCISES: CPT | Mod: GP

## 2018-04-26 PROCEDURE — 99214 OFFICE O/P EST MOD 30 MIN: CPT | Performed by: NEUROLOGICAL SURGERY

## 2018-04-26 PROCEDURE — 97162 PT EVAL MOD COMPLEX 30 MIN: CPT | Mod: GP

## 2018-04-26 NOTE — MR AVS SNAPSHOT
After Visit Summary   4/26/2018    Sharif Giraldo    MRN: [id number]           Patient Information     Date Of Birth          1965        Visit Information        Provider Department      4/26/2018 2:00 PM Martin Minor MD [facility]        Today's Diagnoses     Cervical radiculopathy    -  1       Follow-ups after your visit        Your next 10 appointments already scheduled     May 02, 2018  8:30 AM CDT   Vestibular Treatment with Shakira Rivera PT, NL VESTIBULAR ROOM   [facility] Physical Therapy ([facility])    [address] [place] [address] [address] [place] [address]   [phone]            May 02, 2018  9:40 AM CDT   Return Visit with Juve Juares DO   [facility] ([facility])    [address] [place], [place] [address] [address]   [phone]            May 11, 2018  8:30 AM CDT   Vestibular Treatment with Shakira Rivera PT, NL VESTIBULAR ROOM   [facility] Physical Therapy ([facility])    [address] [place] [address] Dr [place] [address]   [phone]            May 18, 2018 10:00 AM CDT   Vestibular Treatment with Shakira Rivera PT, NL VESTIBULAR ROOM   [facility] Physical Therapy ([facility])    [address] [place] [address] Dr [place]my [place] [address]   [phone]            May 25, 2018  9:15 AM CDT   Vestibular Treatment with Shakira Rivera PT, NL VESTIBULAR ROOM   [facility] Physical Therapy ([facility])    [address] [place] [address] [address] [place] [address] [phone]              Future tests that were ordered for you today     Open Future Orders        Priority Expected Expires Ordered    XR Wrist Left G/E 3 Views Routine 5/2/2018 4/26/2019 4/26/2018            Who to contact     If you have questions or need follow up information about today's clinic visit or your schedule please contact [facility] directly at [phone].  Normal or  "non-critical lab and imaging results will be communicated to you by MyChart, letter or phone within 4 business days after the clinic has received the results. If you do not hear from us within 7 days, please contact the clinic through [facility] or phone. If you have a critical or abnormal lab result, we will notify you by phone as soon as possible.  Submit refill requests through [employer] or call your pharmacy and they will forward the refill request to us. Please allow 3 business days for your refill to be completed.          Additional Information About Your Visit        [employer] Information     [employer] lets you send messages to your doctor, view your test results, renew your prescriptions, schedule appointments and more. To sign up, go to [email or web address] . Click on \"Log in\" on the left side of the screen, which will take you to the Welcome page. Then click on \"Sign up Now\" on the right side of the page.     You will be asked to enter the access code listed below, as well as some personal information. Please follow the directions to create your username and password.     Your access code is: [email or web address]  Expires: 7/10/2018  4:49 PM     Your access code will  in 90 days. If you need help or a new code, please call your [place] clinic or [phone].        Care EveryWhere ID     This is your Care EveryWhere ID. This could be used by other organizations to access your [place] medical records  [id number]        Your Vitals Were     Temperature Height BMI (Body Mass Index)             96.7  F (35.9  C) (Temporal) 1.854 m (6' 1\") 52.25 kg/m2          Blood Pressure from Last 3 Encounters:   18 132/72   18 125/82   18 110/70    Weight from Last 3 Encounters:   18 (!) 179.6 kg (396 lb)   18 (!) 179.9 kg (396 lb 8 oz)   18 (!) 180.1 kg (397 lb)              Today, you had the following     No orders found for display       Primary Care Provider Office Phone # Fax #    " Avery Zapata [phone]       [address] [place] [address]        Equal Access to Services     JULIA ACOSTA : Hadtad aad ku hadkelvinsonia Miguelali, patrickdemetrius tovartomásha, angi valerieradha umana, brian tafoya rox pang. So [facility] [phone].    ATENCIÓN: Si habla español, tiene a clark disposición servicios gratuitos de asistencia lingüística. Llame al [phone].    We comply with applicable federal civil rights laws and [place] laws. We do not discriminate on the basis of race, color, national origin, age, disability, sex, sexual orientation, or gender identity.            Thank you!     Thank you for choosing [facility]  for your care. Our goal is always to provide you with excellent care. Hearing back from our patients is one way we can continue to improve our services. Please take a few minutes to complete the written survey that you may receive in the mail after your visit with us. Thank you!             Your Updated Medication List - Protect others around you: Learn how to safely use, store and throw away your medicines at www.disposemymeds.org.          This list is accurate as of 4/26/18  3:08 PM.  Always use your most recent med list.                   Brand Name Dispense Instructions for use Diagnosis    Calcium carb-Vitamin D 500 mg [place]-200 units 500-200 MG-UNIT per tablet    OSCAL with D;Oyster Shell Calcium    1 tablet    Take 600mg daily        Calcium-Magnesium 300-300 MG Tabs      Take 300 mg by mouth daily        FLUoxetine 40 MG capsule    PROzac    30 capsule    TAKE 1 CAPSULE BY MOUTH DAILY    Major depressive disorder, recurrent episode, mild (H)       GLUCOSAMINE 1500 COMPLEX Caps           HYDROcodone-acetaminophen 5-325 MG per tablet    NORCO    30 tablet    Take 1-2 tablets by mouth every 6 hours as needed for moderate to severe pain    Osteoarthritis of spine with radiculopathy, cervical region        lisinopril-hydrochlorothiazide 20-25 MG per tablet    PRINZIDE/ZESTORETIC    90 tablet    TAKE ONE TABLET BY MOUTH EVERY DAY    Essential hypertension with goal blood pressure less than 140/90       Magnesium 400 MG Tabs      Take 400 mg % by mouth 2 times daily        meloxicam 15 MG tablet    MOBIC    90 tablet    Take 1 tablet (15 mg) by mouth daily    Primary osteoarthritis of left knee       order for DME     1 Device    Dispensed 1 Dorsal (Anterior) Night Splint, Size L/XL, with [facility] agreement signed by patient. Angy Garner CMA, November 30, 2015    Tendonitis, Achilles, left, Primary osteoarthritis of left knee       raltegravir 400 MG tablet    ISENTRESS     Take 400 mg by mouth 2 times daily        tadalafil 5 MG tablet    CIALIS    30 tablet    Take 1 tablet (5 mg) by mouth daily Never use with nitroglycerin, terazosin or doxazosin.    Male impotence       testosterone cypionate 200 MG/ML injection    DEPOTESTOTERONE    1 mL    Inject 1 mL (200 mg) into the muscle every 28 days    Hypogonadism male       Vitamin D (Cholecalciferol) 400 units Tabs      Take 400 Units by mouth daily

## 2018-04-26 NOTE — PROGRESS NOTES
Sharif Giraldo is a 52 year old male who presents for evaluation of his chief complaint of neck pain and left arm pain.  He describes pain that radiates down the left side of his neck and shoulder, into the posterior lateral aspect of his left upper arm.  He does have some numbness and tingling down this distribution as well.  Symptoms have been present for about 1 month, with no particular event or injury.  He also states that he feels his left hand is developing some weakness.  He has just begun treatment with physical therapy.  He is not taking any pain medication.  He denies any bowel or bladder changes, or problems with balance or coordination.  However, he does also mention that he has been dealing with some vertigo type symptoms over the last couple of months as well.     Returns for follow up.  Sharp and tingling pain to the left arm, forearm, elbow, and entire hand.  EMG showed left C7 radiculopathy.  Slight hand weakness.  3 sessions of PT without improvement.  Underwent FRANCOIS 2 weeks ago with significant improvement in pain.    Past Medical History:   Diagnosis Date     HTN (hypertension) 2012     Morbid obesity with BMI of 60.0-69.9, adult (H)      LUH (obstructive sleep apnea) 5/2013    Severe        Past Medical History reviewed with patient during visit.    Past Surgical History:   Procedure Laterality Date     C AFF EYE MUSCLE SURG PROC UNLISTED  1970,1974     C ANESTH,REPAIR LO ABD HERNIA NOS  2000,2001    umbilical area     COLONOSCOPY  9/20/2013    Procedure: COLONOSCOPY;  Colonoscopy;  Surgeon: Avery Morales MD;  Location:  GI     HC EXCISE EXCESS SKIN TISSUE,OTHER      Hx; staph complications of hernia surg     HC KNEE SCOPE, DIAGNOSTIC  1999,2000    Arthroscopy, Knee     HC REMOVAL OF TONSILS,<9 Y/O  1973    Tonsils <10y.o.     HC REPAIR INITIAL INCISIONAL HERNIA; INCARCERATED OR STRANGULATED  09/25/2002    Mesh repair of incarcerated incisional hernia, recurrent.     HERNIA  REPAIR, INCISIONAL  10/09/08    Ventral herniorrhaphy/mesh placement.  Excision left elbow mass.     INJECT EPIDURAL CERVICAL N/A 4/12/2018    Procedure: INJECT EPIDURAL CERVICAL;  translaminar epidural injection at cervical 7 - throacic 1;  Surgeon: Phong Andrade MD;  Location: [facility] OR     Past Surgical History reviewed with patient during visit.    Current Outpatient Prescriptions   Medication     calcium carb 1250 mg, 500 mg [place],/vitamin D 200 units (OSCAL WITH D) 500-200 MG-UNIT per tablet     Calcium-Magnesium 300-300 MG TABS     FLUoxetine (PROZAC) 40 MG capsule     Glucosamine-[place]roit-Vit C-Mn (GLUCOSAMINE 1500 COMPLEX) CAPS     HYDROcodone-acetaminophen (NORCO) 5-325 MG per tablet     lisinopril-hydrochlorothiazide (PRINZIDE/ZESTORETIC) 20-25 MG per tablet     Magnesium 400 MG TABS     meloxicam (MOBIC) 15 MG tablet     order for DME     raltegravir (ISENTRESS) 400 MG tablet     tadalafil (CIALIS) 5 MG tablet     testosterone cypionate (DEPOTESTOTERONE) 200 MG/ML injection     Vitamin D, Cholecalciferol, 400 UNITS TABS     No current facility-administered medications for this visit.        Allergies   Allergen Reactions     No Known Drug Allergies        Social History     Social History     Marital status:      Spouse name: Amparo     Number of children: 0     Years of education: 16     Occupational History     [employer]     Social History Main Topics     Smoking status: Current Every Day Smoker     Packs/day: 0.01     Years: 10.00     Types: Cigars     Smokeless tobacco: Never Used      Comment:  cigars 2/ day     Alcohol use 0.0 oz/week     0 Standard drinks or equivalent per week      Comment: 1 a week      Drug use: No     Sexual activity: Yes     Partners: Female     Other Topics Concern      Service No     Blood Transfusions No     Caffeine Concern Yes     coffee/tea/pop: 3/d       Occupational Exposure No     Hobby Hazards Yes          Sleep Concern No     Stress  "Concern No     Weight Concern Yes     desire wt loss     Special Diet No     Back Care No     Exercise No     Bike Helmet No     Seat Belt Yes     Social History Narrative       Family History   Problem Relation Age of Onset     HEART DISEASE Paternal Grandfather      heart attack     CEREBROVASCULAR DISEASE Paternal Grandfather      Alzheimer Disease Paternal Grandfather      HEART DISEASE Maternal Grandmother      heart attack     DIABETES Maternal Grandmother      adult onset     Alzheimer Disease Father      snores     Alzheimer Disease Paternal Grandmother      Hypertension Mother      DIABETES Mother           ROS: 10 point ROS neg other than the symptoms noted above in the HPI.    Vital Signs: /72  Temp 96.7  F (35.9  C) (Temporal)  Ht 1.854 m (6' 1\")  Wt (!) 179.6 kg (396 lb)  BMI 52.25 kg/m2    Examination:  Constitutional:  Alert, well nourished, NAD.  HEENT: Normocephalic, atraumatic.   Pulmonary:  Without shortness of breath, normal effort.   Lymph: no lymphadenopathy to low back or LE.   Integumentary: Skin is free of rashes or lesions.   Cardiovascular:  No pitting edema of BLE.    Psych: Normal affect, no apparent distress    Neurological:  Awake  Alert  Oriented x 3  Speech clear  Cranial nerves II - XII grossly intact  Motor exam   Shoulder Abduction:  Right:  5/5   Left:  5/5  Biceps:                      Right:  5/5   Left:  5/5  Triceps:                     Right:  5/5   Left:  5/5  Wrist Extensors:       Right:  5/5   Left:  5/5  Wrist Flexors:           Right:  5/5   Left:  5/5  Intrinsics:                   Right:  5/5   Left:  4+/5  Strength is overall normal in the upper extremities, but  strength does seem to be slightly decreased on the left  Sensation normal to bilateral upper and lower extremities.    Reflexes are 2+ in the brachial radialis and triceps. Negative Paty sign bilaterally.    Musculoskeletal:  Gait: Able to stand from a seated position. Normal non-antalgic, " non-myelopathic gait.  Able to heel/toe walk without loss of balance  Cervical examination reveals good range of motion.  No tenderness to palpation of the cervical spine or paraspinous muscles bilaterally.    Imaging:   MRI of the cervical spine [facility] was reviewed in the office today.  It demonstrates multiple levels of disc degeneration, with left paracentral disc herniation at C6-7.  There is also disc herniation at C7-T1.  Both of these contribute to bilateral foraminal stenosis.    Assessment/Plan:     Cervicalgia  Cervical disc herniation C6-7 and C7-T1  I lateral upper extremity paresthesias      Left sided disk herniations and stenosis at C5-6, C6-7, and C7-T1  Will continue to observe results from FRANCOIS  Discussed that he can do 3 FRANCOIS per year  Continuing PT for vertigo and neck pain  If symptoms recur, could consider C5-T1 ACDF

## 2018-04-26 NOTE — NURSING NOTE
"Sharif Giraldo is a 52 year old male who presents for:  Chief Complaint   Patient presents with     Neurologic Problem     follow up after DILAN and PT, DILAN did well, PT did not do well         Initial Vitals:  /72  Temp 96.7  F (35.9  C) (Temporal)  Ht 6' 1\" (1.854 m)  Wt (!) 396 lb (179.6 kg)  BMI 52.25 kg/m2 Estimated body mass index is 52.25 kg/(m^2) as calculated from the following:    Height as of this encounter: 6' 1\" (1.854 m).    Weight as of this encounter: 396 lb (179.6 kg).. Body surface area is 3.04 meters squared. BP completed using cuff size: large  Data Unavailable    Do you feel safe in your environment?  Yes  Do you need any refills today? No    Nursing Comments:         Acosta Veliz    "

## 2018-04-27 NOTE — PROGRESS NOTES
04/26/18 1020   Quick Adds   Quick Adds Vestibular Eval   Type of Visit Initial OP PT Evaluation   General Information   Start of Care Date 04/26/18   Referring Physician Dr. Gogo Lebron   Orders Evaluate and Treat as Indicated   Order Date 04/23/18   Medical Diagnosis peripheral vertigo   Onset of illness/injury or Date of Surgery 03/26/18   Precautions/Limitations no known precautions/limitations   Surgical/Medical history reviewed Yes   Pertinent history of current problem (include personal factors and/or comorbidities that impact the POC) dizziness, pain in knee, ankle. cervical pain with injection 4/12/2018  C7 T1  disc issue C6 C7   Pertinent Visual History  2 mons ago had some visual distortion but short lived 15 secs.    Prior level of functional mobility (indep mobilty but antalgic gait )   Prior level of function comment pulled a hamstring this last weekend he states .    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 just some joint pain limiting wlking distance   Patient/Family Goals Statement get rid of the dizziness   Fall Risk Screen   Fall screen completed by PT   Have you fallen 2 or more times in the past year? No   Have you fallen and had an injury in the past year? No   System Outcome Measures   Outcome Measures BPPV   Dizziness Handicap Inventory (score out of 100) A decrease in score by 17.18 or greater indicates a clinically significant change in symptoms. 24   Pain   Patient currently in pain Yes   Pain location knees and neck   Pain rating knees 3/10 at rest, 7/10 with walking. Neck 3/10 average, best is 0/10   Pain description Ache   Cognitive Status Examination   Orientation orientation to person, place and time   Level of Consciousness alert   Follows Commands and Answers Questions  100% of the time   Personal Safety and Judgment intact   Memory intact   Posture   Posture Forward head position;Protracted shoulders   Range of Motion (ROM)   ROM Comment Cervical ROM 50% extension. 80% rotation bilaterally but slow due to dizziness increase. side bent 50%   Strength   Strength Comments 5/5 UE and LE   Bed Mobility   Bed Mobility Comments indep   Transfer Skills   Transfer Comments indep   Gait   Gait Comments wide base , antalgic   Balance   Balance Comments needs modified CTSIB   Sensory Examination   Sensory Perception no deficits were identified   Coordination   Coordination no deficits were identified   Muscle Tone   Muscle Tone no deficits were identified   Cervicogenic Screen   Neck ROM see above   Vertebral Artery Test Comments provacation test negative   Oculomotor Exam   Smooth Pursuit Normal   Saccades Comments mild loss of focus   VOR Normal   Rapid Head Thrust Normal   Convergence Testing Normal   Infrared Goggle Exam or Frenzel Lense Exam   Vestibular Suppressant in Last 24 Hours? No   Exam completed with Frenzel Lenses   Spontaneous Nystagmus comments neg   Katerin-Hallpike (right) Negative   Katerin-Hallpike (Left) Negative   HSCC Supine Roll Test (Right) Negative   HSCC Supine Roll Test (Left) Negative   Planned Therapy Interventions   Planned Therapy Interventions balance training;manual therapy   Clinical Impression   Criteria for Skilled Therapeutic Interventions Met yes, treatment indicated   PT Diagnosis cervical dysfunction and eye focus dysfunction   Functional limitations due to impairments turnign head too fast causes dizziness and lokking down and then back up hke gets the feeling of being off balance. He states that layin gdown and then getting up causes the feeling of being off balance   Clinical Presentation Evolving/Changing   Clinical Presentation Rationale more testing with balance and cervical spine   Clinical Decision Making (Complexity) Moderate complexity   Therapy  Frequency 1 time/week   Predicted Duration of Therapy Intervention (days/wks) 60 days   Risk & Benefits of therapy have been explained Yes   Patient, Family & other staff in agreement with plan of care Yes   Education Assessment   Preferred Learning Style Listening;Demonstration;Pictures/video   Barriers to Learning No barriers   GOALS   PT Eval Goals 1;2   Goal 1   Goal Identifier 1   Goal Description Pt will have DHI reduced 75 to 100% to indicate unbalance feeling is gone.   Target Date 06/24/18   Total Evaluation Time   Total Evaluation Time (Minutes) 45

## 2018-05-02 ENCOUNTER — RADIANT APPOINTMENT (OUTPATIENT)
Dept: GENERAL RADIOLOGY | Facility: CLINIC | Age: 53
End: 2018-05-02
Attending: ORTHOPAEDIC SURGERY
Payer: COMMERCIAL

## 2018-05-02 ENCOUNTER — OFFICE VISIT (OUTPATIENT)
Dept: ORTHOPEDICS | Facility: CLINIC | Age: 53
End: 2018-05-02
Payer: COMMERCIAL

## 2018-05-02 ENCOUNTER — HOSPITAL ENCOUNTER (OUTPATIENT)
Dept: PHYSICAL THERAPY | Facility: CLINIC | Age: 53
Setting detail: THERAPIES SERIES
End: 2018-05-02
Attending: PSYCHIATRY & NEUROLOGY
Payer: COMMERCIAL

## 2018-05-02 VITALS
SYSTOLIC BLOOD PRESSURE: 132 MMHG | WEIGHT: 315 LBS | BODY MASS INDEX: 41.75 KG/M2 | TEMPERATURE: 96.7 F | HEIGHT: 73 IN | DIASTOLIC BLOOD PRESSURE: 70 MMHG

## 2018-05-02 DIAGNOSIS — M25.532 LEFT WRIST PAIN: ICD-10-CM

## 2018-05-02 DIAGNOSIS — G56.02 CARPAL TUNNEL SYNDROME OF LEFT WRIST: Primary | ICD-10-CM

## 2018-05-02 DIAGNOSIS — M54.12 CERVICAL RADICULOPATHY: ICD-10-CM

## 2018-05-02 PROCEDURE — 40000840 ZZHC STATISTIC PT VESTIBULAR VISIT

## 2018-05-02 PROCEDURE — 97112 NEUROMUSCULAR REEDUCATION: CPT | Mod: GP

## 2018-05-02 PROCEDURE — 99213 OFFICE O/P EST LOW 20 MIN: CPT | Performed by: ORTHOPAEDIC SURGERY

## 2018-05-02 PROCEDURE — 73110 X-RAY EXAM OF WRIST: CPT | Mod: TC

## 2018-05-02 PROCEDURE — 97140 MANUAL THERAPY 1/> REGIONS: CPT | Mod: GP

## 2018-05-02 ASSESSMENT — PAIN SCALES - GENERAL: PAINLEVEL: MILD PAIN (3)

## 2018-05-02 NOTE — PROGRESS NOTES
"Consult    Chief Complaint: Sharif Giraldo is a 52 year old male who is being seen for   Chief Complaint   Patient presents with     Musculoskeletal Problem     left wrist pain     Consult     reF: Dr. Riojas       History of Present Illness:   Sharif Giraldo is a 52 year old male who is seen in consultation at the request of Dr. Riojas for evaluation of left wrist symptoms.    Returns to clinic to discuss left wrist issues. States for awhile now has been noticing mild intermittent tingling to all five fingers of his left hand. All fingers equally. Not terribly bothersome per patient. Patient not sure if any time or day or night, or any activity makes it better or worse.  States no alondra numbness. Does notice increasing weakness to his hand.  Right hand dominant, works at a computer.  No pain.  States once a while shooting pain from elbow to hand.  Also notes does have shooting pain from the neck to the hand, has been seeing neurosurgery who states he has a herniated disks in his neck and recently received a cervical FRANCOIS without benefit. Patient got an EMG, which reported C7 radiculopathy and moderate carpal tunnel and so we recommended to see orthopedic surgery.  Has not tried any bracing, medications, or interventions for this.    Is currently being followed by neurosurgery and per patient their plan is to give more time for FRANCOIS to work, then talk possibility of cervical fusion if does not work.     REVIEW OF SYSTEMS  10 point review systems performed otherwise negative as noted as per history of present illness.    Physical Exam:  Vitals: /70 (BP Location: Right arm, Patient Position: Chair, Cuff Size: Adult Large)  Temp 96.7  F (35.9  C) (Temporal)  Ht 1.854 m (6' 1\")  Wt (!) 176.4 kg (388 lb 14.4 oz)  BMI 51.31 kg/m2  BMI= Body mass index is 51.31 kg/(m^2).  Constitutional: healthy, alert and no acute distress   Psychiatric: mentation appears normal and affect normal/bright  NEURO: no focal " deficits  RESP: Normal with easy respirations and no use of accessory muscles to breathe, no audible wheezing or retractions  CV: LUE:   no edema         Regular rate and rhythm by palpation  SKIN: No erythema, rashes, excoriation, or breakdown. No evidence of infection.   JOINT/EXTREMITIES:left wrist/hand. No swelling, no deformity. No significant atrophy of thenar eminence. Full ROM to wrist and fingers.  Full ROM to elbow.  Sensation intact.  Radial pulse 2+.  Decreased  strength left compared to right.  Negative Tinel's at wrist and elbow, negative Phalen's.    Lymph: no appreciated LUE lymphedema      Diagnostic Modalities:  left wrist X-ray: No fracture, dislocation and or lesions. Normal alignment.  Left UE EMG report reviewed: chronic C7 radiculopathy, moderate left carpal tunnel syndrome.   Independent visualization of the images was performed.      Impression: left hand tingling, questionable carpal tunnel syndrome with underlying cervical radiculopathy, questionable cubital tunnel syndrome    Plan:  All of the above pertinent physical exam and imaging modalities findings was reviewed with Sharif.  Patient has an identified cervical radiculopathy and herniated disks in neck per report.  He also does have shooting pain/tingling from neck to hand.  However, he also has +EMG findings and reports of decreasing hand strength.  He also mentions occasional separate elbow to hand pain.  There are elements of C7 radiculopathy + CTS + cubital tunnel syndrome.  Discussed with patient.  Will start with conservative measures for presumed carpal tunnel syndrome as both diagnostic and therapeutic interventions.  Patient agreeable to OT, using a cock up wrist splint at night and returning if not better.      I recommend conservative care for the patient to include formal physical therapy, bracing . Today I provided or dispensed occupational therapy, brace- cock up wrist splint for night.    Return to clinic 4,  week(s) if needed, or sooner as needed for changes.  Re-x-ray on return: No    Scribed by:  Sedrick Padilla APRN, CNP, DNP  12:30 PM  5/2/2018    I attest I have seen and evaluated the patient.  I agree with above impression and plan.  Joaquin Juares D.O.

## 2018-05-02 NOTE — LETTER
5/2/2018         RE: Sharif Giraldo  [address] [facility] [address]        Dear Colleague,    Thank you for referring your patient, Sharif Giraldo, to the [facility]. Please see a copy of my visit note below.    Consult    Chief Complaint: Sharif Giraldo is a 52 year old male who is being seen for   Chief Complaint   Patient presents with     Musculoskeletal Problem     left wrist pain     Consult     reF: Dr. Riojas       History of Present Illness:   Sharif Giraldo is a 52 year old male who is seen in consultation at the request of Dr. Riojas for evaluation of left wrist symptoms.    Returns to clinic to discuss left wrist issues. States for awhile now has been noticing mild intermittent tingling to all five fingers of his left hand. All fingers equally. Not terribly bothersome per patient. Patient not sure if any time or day or night, or any activity makes it better or worse.  States no alondra numbness. Does notice increasing weakness to his hand.  Right hand dominant, works at a computer.  No pain.  States once a while shooting pain from elbow to hand.  Also notes does have shooting pain from the neck to the hand, has been seeing neurosurgery who states he has a herniated disks in his neck and recently received a cervical FRANCOIS without benefit. Patient got an EMG, which reported C7 radiculopathy and moderate carpal tunnel and so we recommended to see orthopedic surgery.  Has not tried any bracing, medications, or interventions for this.    Is currently being followed by neurosurgery and per patient their plan is to give more time for FRANCOIS to work, then talk possibility of cervical fusion if does not work.     REVIEW OF SYSTEMS  10 point review systems performed otherwise negative as noted as per history of present illness.    Physical Exam:  Vitals: /70 (BP Location: Right arm, Patient Position: Chair, Cuff Size: Adult Large)  Temp 96.7  F (35.9  C) (Temporal)  Ht  "1.854 m (6' 1\")  Wt (!) 176.4 kg (388 lb 14.4 oz)  BMI 51.31 kg/m2  BMI= Body mass index is 51.31 kg/(m^2).  Constitutional: healthy, alert and no acute distress   Psychiatric: mentation appears normal and affect normal/bright  NEURO: no focal deficits  RESP: Normal with easy respirations and no use of accessory muscles to breathe, no audible wheezing or retractions  CV: LUE:   no edema         Regular rate and rhythm by palpation  SKIN: No erythema, rashes, excoriation, or breakdown. No evidence of infection.   JOINT/EXTREMITIES:left wrist/hand. No swelling, no deformity. No significant atrophy of thenar eminence. Full ROM to wrist and fingers.  Full ROM to elbow.  Sensation intact.  Radial pulse 2+.  Decreased  strength left compared to right.  Negative Tinel's at wrist and elbow, negative Phalen's.    Lymph: no appreciated LUE lymphedema      Diagnostic Modalities:  left wrist X-ray: No fracture, dislocation and or lesions. Normal alignment.  Left UE EMG report reviewed: chronic C7 radiculopathy, moderate left carpal tunnel syndrome.   Independent visualization of the images was performed.      Impression: left hand tingling, questionable carpal tunnel syndrome with underlying cervical radiculopathy, questionable cubital tunnel syndrome    Plan:  All of the above pertinent physical exam and imaging modalities findings was reviewed with Sharif.  Patient has an identified cervical radiculopathy and herniated disks in neck per report.  He also does have shooting pain/tingling from neck to hand.  However, he also has +EMG findings and reports of decreasing hand strength.  He also mentions occasional separate elbow to hand pain.  There are elements of C7 radiculopathy + CTS + cubital tunnel syndrome.  Discussed with patient.  Will start with conservative measures for presumed carpal tunnel syndrome as both diagnostic and therapeutic interventions.  Patient agreeable to OT, using a cock up wrist splint at night " and returning if not better.      I recommend conservative care for the patient to include formal physical therapy, bracing . Today I provided or dispensed occupational therapy, brace- cock up wrist splint for night.    Return to clinic 4, week(s) if needed, or sooner as needed for changes.  Re-x-ray on return: No    Scribed by:  Sedrick Padilla APRN, CNP, DNP  12:30 PM  5/2/2018    I attest I have seen and evaluated the patient.  I agree with above impression and plan.  Joaquin Juares D.O.        Again, thank you for allowing me to participate in the care of your patient.        Sincerely,        Juve Juares, DO

## 2018-05-02 NOTE — MR AVS SNAPSHOT
After Visit Summary   5/2/2018    Sharif Giraldo    MRN: [id number]           Patient Information     Date Of Birth          1965        Visit Information        Provider Department      5/2/2018 9:40 AM Juve Juares,  [facility]        Today's Diagnoses     Left wrist pain    -  1       Follow-ups after your visit        Your next 10 appointments already scheduled     May 11, 2018  8:30 AM CDT   Vestibular Treatment with Shakira Rivera PT, NL VESTIBULAR ROOM   [facility] ([facility])    [address] [place] [address] Dr Delmy ACUNA [address]   [phone]            May 18, 2018 10:00 AM CDT   Vestibular Treatment with Shakira Rivera PT, NL VESTIBULAR ROOM   [facility] ([facility])    [address] [place] [address] Dr Delmy ACUNA [id number] [phone]            May 25, 2018  9:15 AM CDT   Vestibular Treatment with Shakira Rivera PT, NL VESTIBULAR ROOM   [facility])    [address] [place] [address] Dr Delmy ACUNA [id number] [phone]              Who to contact     If you have questions or need follow up information about today's clinic visit or your schedule please contact [facility] directly at [phone].  Normal or non-critical lab and imaging results will be communicated to you by MyChart, letter or phone within 4 business days after the clinic has received the results. If you do not hear from us within 7 days, please contact the clinic through MyChart or phone. If you have a critical or abnormal lab result, we will notify you by phone as soon as possible.  Submit refill requests through Travee or call your pharmacy and they will forward the refill request to us. Please allow 3 business days for your refill to be completed.          Additional Information About Your Visit        Travee Information     Travee lets you send messages  "to your doctor, view your test results, renew your prescriptions, schedule appointments and more. To sign up, go to [email or web address] . Click on \"Log in\" on the left side of the screen, which will take you to the Welcome page. Then click on \"Sign up Now\" on the right side of the page.     You will be asked to enter the access code listed below, as well as some personal information. Please follow the directions to create your username and password.     Your access code is: [email or web address]  Expires: 7/10/2018  4:49 PM     Your access code will  in 90 days. If you need help or a new code, please call your [facility] clinic or [phone].        Care EveryWhere ID     This is your Care EveryWhere ID. This could be used by other organizations to access your [facility] medical records  [id number]        Your Vitals Were     Temperature Height BMI (Body Mass Index)             96.7  F (35.9  C) (Temporal) 1.854 m (6' 1\") 51.31 kg/m2          Blood Pressure from Last 3 Encounters:   18 132/70   18 132/72   18 125/82    Weight from Last 3 Encounters:   18 (!) 176.4 kg (388 lb 14.4 oz)   18 (!) 179.6 kg (396 lb)   18 (!) 179.9 kg (396 lb 8 oz)               Primary Care Provider Office Phone # Fax #    Avery Zapata [phone] [address] [facility] [address]        Equal Access to Services     [facility] campusADELINA : Hadii arlette Guerra, waandres fu, qabrian mathis. So [facility] [phone].    ATENCIÓN: Si habla español, tiene a clark disposición servicios gratuitos de asistencia lingüística. Natacha al [phone].    We comply with applicable federal civil rights laws and [place] laws. We do not discriminate on the basis of race, color, national origin, age, disability, sex, sexual orientation, or gender identity.            Thank you!     Thank you for choosing [facility] " [facility]  [facility]. Our goal is always to provide you with excellent care. Hearing back from our patients is one way we can continue to improve our services. Please take a few minutes to complete the written survey that you may receive in the mail after your visit with us. Thank you!             Your Updated Medication List - Protect others around you: Learn how to safely use, store and throw away your medicines at www.disposemymeds.org.          This list is accurate as of 5/2/18  9:42 AM.  Always use your most recent med list.                   Brand Name Dispense Instructions for use Diagnosis    Calcium carb-Vitamin D 500 mg [place]-200 units 500-200 MG-UNIT per tablet    OSCAL with D;Oyster Shell Calcium    1 tablet    Take 600mg daily        Calcium-Magnesium 300-300 MG Tabs      Take 300 mg by mouth daily        FLUoxetine 40 MG capsule    PROzac    30 capsule    TAKE 1 CAPSULE BY MOUTH DAILY    Major depressive disorder, recurrent episode, mild (H)       GLUCOSAMINE 1500 COMPLEX Caps           HYDROcodone-acetaminophen 5-325 MG per tablet    NORCO    30 tablet    Take 1-2 tablets by mouth every 6 hours as needed for moderate to severe pain    Osteoarthritis of spine with radiculopathy, cervical region       lisinopril-hydrochlorothiazide 20-25 MG per tablet    PRINZIDE/ZESTORETIC    90 tablet    TAKE ONE TABLET BY MOUTH EVERY DAY    Essential hypertension with goal blood pressure less than 140/90       Magnesium 400 MG Tabs      Take 400 mg % by mouth 2 times daily        meloxicam 15 MG tablet    MOBIC    90 tablet    Take 1 tablet (15 mg) by mouth daily    Primary osteoarthritis of left knee       order for DME     1 Device    Dispensed 1 Dorsal (Anterior) Night Splint, Size L/XL, with [facility] agreement signed by patient. Angy Garner CMA, November 30, 2015    Tendonitis, Achilles, left, Primary osteoarthritis of left knee       raltegravir 400 MG tablet    ISENTRESS     Take 400 mg by mouth 2 times  daily        tadalafil 5 MG tablet    CIALIS    30 tablet    Take 1 tablet (5 mg) by mouth daily Never use with nitroglycerin, terazosin or doxazosin.    Male impotence       testosterone cypionate 200 MG/ML injection    DEPOTESTOTERONE    1 mL    Inject 1 mL (200 mg) into the muscle every 28 days    Hypogonadism male       Vitamin D (Cholecalciferol) 400 units Tabs      Take 400 Units by mouth daily

## 2018-05-02 NOTE — NURSING NOTE
"Chief Complaint   Patient presents with     Musculoskeletal Problem     left wrist pain     Consult     reF: Dr. Riojas       Initial /70 (BP Location: Right arm, Patient Position: Chair, Cuff Size: Adult Large)  Temp 96.7  F (35.9  C) (Temporal)  Ht 1.854 m (6' 1\")  Wt (!) 176.4 kg (388 lb 14.4 oz)  BMI 51.31 kg/m2 Estimated body mass index is 51.31 kg/(m^2) as calculated from the following:    Height as of this encounter: 1.854 m (6' 1\").    Weight as of this encounter: 176.4 kg (388 lb 14.4 oz).  Medication Reconciliation: complete   Nelsy/EBONY     "

## 2018-05-15 ENCOUNTER — HOSPITAL ENCOUNTER (OUTPATIENT)
Dept: OCCUPATIONAL THERAPY | Facility: CLINIC | Age: 53
Setting detail: THERAPIES SERIES
End: 2018-05-15
Attending: NURSE PRACTITIONER
Payer: COMMERCIAL

## 2018-05-15 PROCEDURE — 97110 THERAPEUTIC EXERCISES: CPT | Mod: GO | Performed by: OCCUPATIONAL THERAPIST

## 2018-05-15 PROCEDURE — 40000839 ZZH STATISTIC HAND THERAPY VISIT: Performed by: OCCUPATIONAL THERAPIST

## 2018-05-15 PROCEDURE — 97165 OT EVAL LOW COMPLEX 30 MIN: CPT | Mod: GO | Performed by: OCCUPATIONAL THERAPIST

## 2018-05-18 ENCOUNTER — HOSPITAL ENCOUNTER (OUTPATIENT)
Dept: PHYSICAL THERAPY | Facility: CLINIC | Age: 53
Setting detail: THERAPIES SERIES
End: 2018-05-18
Attending: PSYCHIATRY & NEUROLOGY
Payer: COMMERCIAL

## 2018-05-18 PROCEDURE — 40000840 ZZHC STATISTIC PT VESTIBULAR VISIT

## 2018-05-18 PROCEDURE — 97140 MANUAL THERAPY 1/> REGIONS: CPT | Mod: GP

## 2018-05-18 PROCEDURE — 97112 NEUROMUSCULAR REEDUCATION: CPT | Mod: GP

## 2018-05-25 ENCOUNTER — HOSPITAL ENCOUNTER (OUTPATIENT)
Dept: PHYSICAL THERAPY | Facility: CLINIC | Age: 53
Setting detail: THERAPIES SERIES
End: 2018-05-25
Attending: PSYCHIATRY & NEUROLOGY
Payer: COMMERCIAL

## 2018-05-25 PROCEDURE — 97140 MANUAL THERAPY 1/> REGIONS: CPT | Mod: GP

## 2018-05-25 PROCEDURE — 97110 THERAPEUTIC EXERCISES: CPT | Mod: GP

## 2018-05-25 PROCEDURE — 40000840 ZZHC STATISTIC PT VESTIBULAR VISIT

## 2018-06-15 ENCOUNTER — HOSPITAL ENCOUNTER (OUTPATIENT)
Dept: PHYSICAL THERAPY | Facility: CLINIC | Age: 53
Setting detail: THERAPIES SERIES
End: 2018-06-15
Attending: PSYCHIATRY & NEUROLOGY
Payer: COMMERCIAL

## 2018-06-15 PROCEDURE — 40000840 ZZHC STATISTIC PT VESTIBULAR VISIT

## 2018-06-15 PROCEDURE — 97140 MANUAL THERAPY 1/> REGIONS: CPT | Mod: GP

## 2018-06-15 PROCEDURE — 97112 NEUROMUSCULAR REEDUCATION: CPT | Mod: GP

## 2018-06-19 NOTE — ADDENDUM NOTE
Encounter addended by: Shakira Rivera, PT on: 6/18/2018  7:16 PM<BR>     Actions taken: Flowsheet data copied forward, Flowsheet accepted

## 2018-06-22 ENCOUNTER — HOSPITAL ENCOUNTER (OUTPATIENT)
Dept: PHYSICAL THERAPY | Facility: CLINIC | Age: 53
Setting detail: THERAPIES SERIES
End: 2018-06-22
Attending: PSYCHIATRY & NEUROLOGY
Payer: COMMERCIAL

## 2018-06-22 DIAGNOSIS — R60.0 BILATERAL LEG EDEMA: Primary | ICD-10-CM

## 2018-06-22 PROCEDURE — 40000718 ZZHC STATISTIC PT DEPARTMENT ORTHO VISIT

## 2018-06-22 PROCEDURE — 97110 THERAPEUTIC EXERCISES: CPT | Mod: GP

## 2018-06-22 PROCEDURE — 97140 MANUAL THERAPY 1/> REGIONS: CPT | Mod: GP

## 2018-06-26 NOTE — ADDENDUM NOTE
Encounter addended by: Shakira Rivera, PT on: 6/26/2018 10:56 AM<BR>     Actions taken: Flowsheet data copied forward, Flowsheet accepted

## 2018-07-13 ENCOUNTER — HOSPITAL ENCOUNTER (OUTPATIENT)
Dept: PHYSICAL THERAPY | Facility: CLINIC | Age: 53
Setting detail: THERAPIES SERIES
End: 2018-07-13
Attending: PSYCHIATRY & NEUROLOGY
Payer: COMMERCIAL

## 2018-07-13 PROCEDURE — 97140 MANUAL THERAPY 1/> REGIONS: CPT | Mod: GP

## 2018-07-13 PROCEDURE — 40000840 ZZHC STATISTIC PT VESTIBULAR VISIT

## 2018-07-16 NOTE — ADDENDUM NOTE
Encounter addended by: Shakira Rivera, PT on: 7/16/2018  6:16 PM<BR>     Actions taken: Flowsheet data copied forward, Flowsheet accepted

## 2018-10-10 NOTE — ADDENDUM NOTE
Encounter addended by: Shakira Rivera, PT on: 10/10/2018  9:04 AM<BR>     Actions taken: Sign clinical note, Episode resolved

## 2018-10-10 NOTE — PROGRESS NOTES
Outpatient Physical Therapy Discharge Note     Patient: Sharif Giraldo  : 1965    Beginning/End Dates of Reporting Period:  2018 to 2018    Referring Provider: Dr. Gogo Lebron    Therapy Diagnosis: Vertigo     Client Self Report: Pt reports that the dizziness symptoms have reduced 90% from the begining . He states that he is ready to end PT. He understands that we are at our best for the dizziness reduction. He has knee pain and some swelling in LE. He is exercising in the pool for calorie burning because he needs a total knee. 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 He has it managed now.       Outcome Measures (most recent score):  Dizziness Handicap Inventory (score out of 100) A decrease in score by 17.18 or greater indicates a clinically significant change in symptoms.: 4     Goals:  Goal Identifier 1   Goal Description Pt will have DHI reduced 75 to 100% to indicate unbalance feeling is gone.   Target Date 18   Date Met      Progress: goal met     Plan:  Discharge from therapy.    Discharge:    Reason for Discharge: Patient has met all goals.    Equipment Issued: none    Discharge Plan: Patient to continue home program.

## 2018-10-17 DIAGNOSIS — M17.12 PRIMARY OSTEOARTHRITIS OF LEFT KNEE: ICD-10-CM

## 2018-10-17 DIAGNOSIS — I10 ESSENTIAL HYPERTENSION WITH GOAL BLOOD PRESSURE LESS THAN 140/90: ICD-10-CM

## 2018-10-19 RX ORDER — LISINOPRIL AND HYDROCHLOROTHIAZIDE 20; 25 MG/1; MG/1
TABLET ORAL
Qty: 90 TABLET | Refills: 0 | Status: SHIPPED | OUTPATIENT
Start: 2018-10-19 | End: 2019-01-18

## 2018-10-19 RX ORDER — MELOXICAM 15 MG/1
TABLET ORAL
Qty: 90 TABLET | Refills: 0 | Status: SHIPPED | OUTPATIENT
Start: 2018-10-19 | End: 2019-01-18

## 2018-10-19 NOTE — TELEPHONE ENCOUNTER
"Lisinopril-hydrochlorothiazide Last Written Prescription Date:  1/17/18  Last Fill Quantity: 90,  # refills: 2   Last office visit: 4/11/2018 with prescribing provider:  Avery Dasilva Office Visit:      [place] Last Written Prescription Date:  10/5/17  Last Fill Quantity: 90,  # refills: 3   Last office visit: 4/11/2018 with prescribing provider:  Avery Dasilva Office Visit:      Requested Prescriptions   Pending Prescriptions Disp Refills     lisinopril-hydrochlorothiazide (PRINZIDE/ZESTORETIC) 20-25 MG per tablet [Pharmacy Med Name: LISINOPRIL-HYDROCHLOROTH 20-25 TABS] 90 tablet 2     Sig: TAKE ONE TABLET BY MOUTH EVERY DAY    Diuretics (Including Combos) Protocol Failed    10/17/2018  7:42 PM       Failed - Normal serum creatinine on file in past 12 months    Recent Labs   Lab Test  10/05/17   0823   CR  0.72             Failed - Normal serum potassium on file in past 12 months    Recent Labs   Lab Test  10/05/17   0823   POTASSIUM  4.2                   Failed - Normal serum sodium on file in past 12 months    Recent Labs   Lab Test  10/05/17   0823   NA  144             Passed - Blood pressure under 140/90 in past 12 months    BP Readings from Last 3 Encounters:   05/02/18 132/70   04/26/18 132/72   04/12/18 125/82                Passed - Recent (12 mo) or future (30 days) visit within the authorizing provider's specialty    Patient had office visit in the last 12 months or has a visit in the next 30 days with authorizing provider or within the authorizing provider's specialty.  See \"Patient Info\" tab in inbasket, or \"Choose Columns\" in Meds & Orders section of the refill encounter.             Passed - Patient is age 18 or older        meloxicam (MOBIC) 15 MG tablet [Pharmacy Med Name: MELOXICAM 15MG TABS] 90 tablet 3     Sig: TAKE ONE TABLET BY MOUTH EVERY DAY    NSAID Medications Failed    10/17/2018  7:42 PM       Failed - Normal ALT on file in past 12 months    Recent Labs   Lab Test  " "10/05/17   0823   ALT  40            Failed - Normal AST on file in past 12 months    Recent Labs   Lab Test  10/05/17   0823   AST  21            Failed - Normal CBC on file in past 12 months    Recent Labs   Lab Test  01/04/18   0957  09/23/16   0839   WBC   --   5.9   RBC   --   6.24*   HGB  13.8  12.9*   HCT   --   39.3*   PLT   --   222                Failed - Normal serum creatinine on file in past 12 months    Recent Labs   Lab Test  10/05/17   0823   CR  0.72            Passed - Blood pressure under 140/90 in past 12 months    BP Readings from Last 3 Encounters:   05/02/18 132/70   04/26/18 132/72   04/12/18 125/82                Passed - Recent (12 mo) or future (30 days) visit within the authorizing provider's specialty    Patient had office visit in the last 12 months or has a visit in the next 30 days with authorizing provider or within the authorizing provider's specialty.  See \"Patient Info\" tab in inbasket, or \"Choose Columns\" in Meds & Orders section of the refill encounter.             Passed - Patient is age 6-64 years        Routing refill request to provider for review/approval because:  Drug interaction warning  Labs not current:  CR, K+, NA, ALT, AST, CBC    Piper Coronado RN          "

## 2018-10-25 DIAGNOSIS — E29.1 HYPOGONADISM MALE: ICD-10-CM

## 2018-10-26 RX ORDER — TESTOSTERONE CYPIONATE 200 MG/ML
200 INJECTION, SOLUTION INTRAMUSCULAR
Qty: 1 ML | Refills: 5 | Status: SHIPPED | OUTPATIENT
Start: 2018-10-26 | End: 2020-02-17

## 2018-10-26 NOTE — TELEPHONE ENCOUNTER
testosterone cypionate (DEPOTESTOTERONE) 200 MG/ML injection      Last Written Prescription Date:  1/4/2018  Last Fill Quantity: 1 mL,   # refills: 5  Last Office Visit: 4/11/2018  Future Office visit:       Routing refill request to provider for review/approval because:  Drug not on the [facility], [facility] or [facility] refill protocol or controlled substance

## 2019-01-18 ENCOUNTER — OFFICE VISIT (OUTPATIENT)
Dept: FAMILY MEDICINE | Facility: CLINIC | Age: 54
End: 2019-01-18
Payer: COMMERCIAL

## 2019-01-18 VITALS
HEIGHT: 74 IN | HEART RATE: 87 BPM | RESPIRATION RATE: 16 BRPM | WEIGHT: 315 LBS | SYSTOLIC BLOOD PRESSURE: 130 MMHG | BODY MASS INDEX: 40.43 KG/M2 | TEMPERATURE: 97.1 F | OXYGEN SATURATION: 98 % | DIASTOLIC BLOOD PRESSURE: 70 MMHG

## 2019-01-18 DIAGNOSIS — E29.1 HYPOGONADISM MALE: ICD-10-CM

## 2019-01-18 DIAGNOSIS — E78.5 HYPERLIPIDEMIA LDL GOAL <130: ICD-10-CM

## 2019-01-18 DIAGNOSIS — M47.22 OSTEOARTHRITIS OF SPINE WITH RADICULOPATHY, CERVICAL REGION: ICD-10-CM

## 2019-01-18 DIAGNOSIS — M17.12 PRIMARY OSTEOARTHRITIS OF LEFT KNEE: ICD-10-CM

## 2019-01-18 DIAGNOSIS — I10 ESSENTIAL HYPERTENSION WITH GOAL BLOOD PRESSURE LESS THAN 140/90: ICD-10-CM

## 2019-01-18 DIAGNOSIS — Z12.5 SCREENING FOR PROSTATE CANCER: ICD-10-CM

## 2019-01-18 DIAGNOSIS — Z00.01 ENCOUNTER FOR GENERAL ADULT MEDICAL EXAMINATION WITH ABNORMAL FINDINGS: Primary | ICD-10-CM

## 2019-01-18 DIAGNOSIS — E66.01 MORBID OBESITY (H): ICD-10-CM

## 2019-01-18 LAB
ALBUMIN SERPL-MCNC: 3.8 G/DL (ref 3.4–5)
ALP SERPL-CCNC: 91 U/L (ref 40–150)
ALT SERPL W P-5'-P-CCNC: 38 U/L (ref 0–70)
ANION GAP SERPL CALCULATED.3IONS-SCNC: 5 MMOL/L (ref 3–14)
AST SERPL W P-5'-P-CCNC: 21 U/L (ref 0–45)
BILIRUB SERPL-MCNC: 0.8 MG/DL (ref 0.2–1.3)
BUN SERPL-MCNC: 13 MG/DL (ref 7–30)
CALCIUM SERPL-MCNC: 8.9 MG/DL (ref 8.5–10.1)
CHLORIDE SERPL-SCNC: 107 MMOL/L (ref 94–109)
CHOLEST SERPL-MCNC: 114 MG/DL
CO2 SERPL-SCNC: 29 MMOL/L (ref 20–32)
CREAT SERPL-MCNC: 0.74 MG/DL (ref 0.66–1.25)
GFR SERPL CREATININE-BSD FRML MDRD: >90 ML/MIN/{1.73_M2}
GLUCOSE SERPL-MCNC: 106 MG/DL (ref 70–99)
HDLC SERPL-MCNC: 41 MG/DL
HGB BLD-MCNC: 13.3 G/DL (ref 13.3–17.7)
LDLC SERPL CALC-MCNC: 55 MG/DL
NONHDLC SERPL-MCNC: 73 MG/DL
POTASSIUM SERPL-SCNC: 4.1 MMOL/L (ref 3.4–5.3)
PROT SERPL-MCNC: 6.9 G/DL (ref 6.8–8.8)
PSA SERPL-MCNC: 2.19 UG/L (ref 0–4)
SODIUM SERPL-SCNC: 141 MMOL/L (ref 133–144)
TRIGL SERPL-MCNC: 89 MG/DL

## 2019-01-18 PROCEDURE — 99213 OFFICE O/P EST LOW 20 MIN: CPT | Mod: 25 | Performed by: FAMILY MEDICINE

## 2019-01-18 PROCEDURE — 36415 COLL VENOUS BLD VENIPUNCTURE: CPT | Performed by: FAMILY MEDICINE

## 2019-01-18 PROCEDURE — 85018 HEMOGLOBIN: CPT | Performed by: FAMILY MEDICINE

## 2019-01-18 PROCEDURE — 84153 ASSAY OF PSA TOTAL: CPT | Performed by: FAMILY MEDICINE

## 2019-01-18 PROCEDURE — 80053 COMPREHEN METABOLIC PANEL: CPT | Performed by: FAMILY MEDICINE

## 2019-01-18 PROCEDURE — 99396 PREV VISIT EST AGE 40-64: CPT | Performed by: FAMILY MEDICINE

## 2019-01-18 PROCEDURE — 80061 LIPID PANEL: CPT | Performed by: FAMILY MEDICINE

## 2019-01-18 RX ORDER — MELOXICAM 15 MG/1
15 TABLET ORAL DAILY
Qty: 90 TABLET | Refills: 0 | Status: SHIPPED | OUTPATIENT
Start: 2019-01-18 | End: 2019-04-26

## 2019-01-18 RX ORDER — HYDROCODONE BITARTRATE AND ACETAMINOPHEN 5; 325 MG/1; MG/1
1-2 TABLET ORAL EVERY 6 HOURS PRN
Qty: 30 TABLET | Refills: 0 | Status: SHIPPED | OUTPATIENT
Start: 2019-01-18 | End: 2019-01-18

## 2019-01-18 RX ORDER — LISINOPRIL AND HYDROCHLOROTHIAZIDE 20; 25 MG/1; MG/1
1 TABLET ORAL DAILY
Qty: 90 TABLET | Refills: 3 | Status: SHIPPED | OUTPATIENT
Start: 2019-01-18 | End: 2020-02-17

## 2019-01-18 ASSESSMENT — ENCOUNTER SYMPTOMS
FEVER: 0
NERVOUS/ANXIOUS: 0
HEMATOCHEZIA: 0
DIZZINESS: 0
DIARRHEA: 0
ABDOMINAL PAIN: 0
COUGH: 0
CHILLS: 0
FREQUENCY: 0
HEMATURIA: 0
CONSTIPATION: 0
EYE PAIN: 0

## 2019-01-18 ASSESSMENT — PATIENT HEALTH QUESTIONNAIRE - PHQ9: SUM OF ALL RESPONSES TO PHQ QUESTIONS 1-9: 5

## 2019-01-18 ASSESSMENT — MIFFLIN-ST. JEOR: SCORE: 2678.21

## 2019-01-18 ASSESSMENT — PAIN SCALES - GENERAL: PAINLEVEL: MILD PAIN (3)

## 2019-01-18 NOTE — PROGRESS NOTES
SUBJECTIVE:   CC: Sharif Giraldo is an 53 year old male who presents for preventative health visit.     Physical   Annual:     Getting at least 3 servings of Calcium per day:  Yes    Bi-annual eye exam:  NO    Dental care twice a year:  Yes    Sleep apnea or symptoms of sleep apnea:  Sleep apnea    Diet:  Regular (no restrictions)    Frequency of exercise:  1 day/week    Duration of exercise:  Less than 15 minutes    Taking medications regularly:  Yes    Medication side effects:  None    Additional concerns today:  No    PHQ-2 Total Score: 2      His refill on his medication for hypertension.  Needs some labs done and will discuss the results of these.  He needs a refill on his meloxicam which he takes for chronic neck pain and osteoarthritis of the knee.  He had injection of the C-spine last year which helped for a while.  He was suffering from vertigo and went to the vestibular clinic and this helped with that but he thinks a lot of it is from the neck.  Overall he is much better.  He needs follow-up with urology for his hypogonadism.  We will arrange this and get a PSA.    Patient is fasting labs are placed    Today's PHQ-2 Score:   PHQ-2 ( 1999 Pf[employer]) 1/18/2019   Q1: Little interest or pleasure in doing things 1   Q2: Feeling down, depressed or hopeless 1   PHQ-2 Score 2   Q1: Little interest or pleasure in doing things Several days   Q2: Feeling down, depressed or hopeless Several days   PHQ-2 Score 2       Abuse: Current or Past(Physical, Sexual or Emotional)- No  Do you feel safe in your environment? Yes    Social History     Tobacco Use     Smoking status: Current Every Day Smoker     Packs/day: 0.01     Years: 10.00     Pack years: 0.10     Types: Cigars     Smokeless tobacco: Never Used     Tobacco comment:  cigars 2/ day   Substance Use Topics     Alcohol use: Yes     Alcohol/week: 0.0 oz     Comment: 1 a week      Alcohol Use 1/18/2019   If you drink alcohol do you typically have greater than 3  drinks per day OR greater than 7 drinks per week? No       Last PSA:   PSA   Date Value Ref Range Status   06/10/2016 1.10 0 - 4 ug/L Final       Reviewed orders with patient. Reviewed health maintenance and updated orders accordingly - Yes      Reviewed and updated as needed this visit by clinical staff  Tobacco  Allergies  Meds  Med Hx  Surg Hx  Fam Hx  Soc Hx        Reviewed and updated as needed this visit by Provider        Past Medical History:   Diagnosis Date     HTN (hypertension) 2012     Morbid obesity with BMI of 60.0-69.9, adult (H)      LUH (obstructive sleep apnea) 5/2013    Severe       Past Surgical History:   Procedure Laterality Date     C AFF EYE MUSCLE SURG PROC UNLISTED  1970,1974     C ANESTH,REPAIR LO ABD HERNIA NOS  2000,2001    umbilical area     COLONOSCOPY  9/20/2013    Procedure: COLONOSCOPY;  Colonoscopy;  Surgeon: Avery Morales MD;  Location:  GI     HC EXCISE EXCESS SKIN TISSUE,OTHER      Hx; staph complications of hernia surg     HC KNEE SCOPE, DIAGNOSTIC  1999,2000    Arthroscopy, Knee     HC REMOVAL OF TONSILS,<11 Y/O  1973    Tonsils <10y.o.     HC REPAIR INITIAL INCISIONAL HERNIA; INCARCERATED OR STRANGULATED  09/25/2002    Mesh repair of incarcerated incisional hernia, recurrent.     HERNIA REPAIR, INCISIONAL  10/09/08    Ventral herniorrhaphy/mesh placement.  Excision left elbow mass.     INJECT EPIDURAL CERVICAL N/A 4/12/2018    Procedure: INJECT EPIDURAL CERVICAL;  translaminar epidural injection at cervical 7 - throacic 1;  Surgeon: Phong Andrade MD;  Location: [facility]       Review of Systems   Constitutional: Negative for chills and fever.   HENT: Negative for congestion and ear pain.    Eyes: Negative for pain.   Respiratory: Negative for cough.    Cardiovascular: Negative for chest pain.   Gastrointestinal: Negative for abdominal pain, constipation, diarrhea and hematochezia.   Genitourinary: Negative for frequency, genital sores and hematuria.    Neurological: Negative for dizziness.   Psychiatric/Behavioral: The patient is not nervous/anxious.          OBJECTIVE:   There were no vitals taken for this visit.    Physical Exam  GENERAL: healthy, alert and no distress  EYES: Eyes grossly normal to inspection, PERRL and conjunctivae and sclerae normal  HENT: ear canals and TM's normal, nose and mouth without ulcers or lesions  NECK: no adenopathy, no asymmetry, masses, or scars and thyroid normal to palpation  RESP: lungs clear to auscultation - no rales, rhonchi or wheezes  CV: regular rate and rhythm, normal S1 S2, no S3 or S4, no murmur, click or rub, no peripheral edema and peripheral pulses strong  ABDOMEN: soft, nontender, no hepatosplenomegaly, no masses and bowel sounds normal  MS: no gross musculoskeletal defects noted, no edema  SKIN: no suspicious lesions or rashes  NEURO: Normal strength and tone, mentation intact and speech normal  PSYCH: mentation appears normal, affect normal/bright    Diagnostic Test Results:  Results for orders placed or performed in visit on 01/18/19 (from the past 24 hour(s))   Lipid panel reflex to direct LDL Fasting   Result Value Ref Range    Cholesterol 114 <200 mg/dL    Triglycerides 89 <150 mg/dL    HDL Cholesterol 41 >39 mg/dL    LDL Cholesterol Calculated 55 <100 mg/dL    Non HDL Cholesterol 73 <130 mg/dL   PSA, tumor marker   Result Value Ref Range    PSA 2.19 0 - 4 ug/L   Comprehensive metabolic panel (BMP + Alb, Alk Phos, ALT, AST, Total. Bili, TP)   Result Value Ref Range    Sodium 141 133 - 144 mmol/L    Potassium 4.1 3.4 - 5.3 mmol/L    Chloride 107 94 - 109 mmol/L    Carbon Dioxide 29 20 - 32 mmol/L    Anion Gap 5 3 - 14 mmol/L    Glucose 106 (H) 70 - 99 mg/dL    Urea Nitrogen 13 7 - 30 mg/dL    Creatinine 0.74 0.66 - 1.25 mg/dL    GFR Estimate >90 >60 mL/min/[1.73_m2]    GFR Estimate If Black >90 >60 mL/min/[1.73_m2]    Calcium 8.9 8.5 - 10.1 mg/dL    Bilirubin Total 0.8 0.2 - 1.3 mg/dL    Albumin 3.8 3.4 -  "5.0 g/dL    Protein Total 6.9 6.8 - 8.8 g/dL    Alkaline Phosphatase 91 40 - 150 U/L    ALT 38 0 - 70 U/L    AST 21 0 - 45 U/L   Hemoglobin   Result Value Ref Range    Hemoglobin 13.3 13.3 - 17.7 g/dL       ASSESSMENT/PLAN:   1. Encounter for general adult medical examination with abnormal findings  See discussion below.    2. Osteoarthritis of spine with radiculopathy, cervical region  Refill his Mobic.  He states he is not taking Vicodin anymore.  Follow with the spine clinic if needed.    3. Essential hypertension with goal blood pressure less than 140/90  Blood pressure is fine is tolerating medication well.  Will notify him with the test results and refill his medication.  - lisinopril-hydrochlorothiazide (PRINZIDE/ZESTORETIC) 20-25 MG tablet; Take 1 tablet by mouth daily  Dispense: 90 tablet; Refill: 3  - Comprehensive metabolic panel (BMP + Alb, Alk Phos, ALT, AST, Total. Bili, TP)    4. Hyperlipidemia LDL goal <130  We will notify with results.  - Lipid panel reflex to direct LDL Fasting    5. Screening for prostate cancer  Follows with urology.  Will make a follow-up appointment for him.  - PSA, tumor marker    6. Hypogonadism male  We will notify with results this is checked because of his testosterone injections.  - Hemoglobin    7. Primary osteoarthritis of left knee    - meloxicam (MOBIC) 15 MG tablet; Take 1 tablet (15 mg) by mouth daily  Dispense: 90 tablet; Refill: 0    8. Morbid obesity (H)  Discussed dietary consult with him.      COUNSELING:   Reviewed preventive health counseling, as reflected in patient instructions       Regular exercise       Healthy diet/nutrition       Vision screening    BP Readings from Last 1 Encounters:   05/02/18 132/70     Estimated body mass index is 51.31 kg/m  as calculated from the following:    Height as of 5/2/18: 1.854 m (6' 1\").    Weight as of 5/2/18: 176.4 kg (388 lb 14.4 oz).      Weight management plan: Discussed healthy diet and exercise guidelines     " reports that he has been smoking cigars.  He has a 0.10 pack-year smoking history. he has never used smokeless tobacco.  Tobacco Cessation Action Plan: Self help information given to patient    Counseling Resources:  ATP IV Guidelines  Pooled Cohorts Equation Calculator  FRAX Risk Assessment  ICSI Preventive Guidelines  Dietary Guidelines for Americans, 2010  USDA's MyPlate  ASA Prophylaxis  Lung CA Screening    Avery Zapata MD  [facility]

## 2019-01-18 NOTE — LETTER
January 31, 2019      Sharif Giraldo  [address] [address] [facility] [address]        Dear ,    We are writing to inform you of your test results.    PSA normal at 2.19 but is up from 2 years ago when it was 1.1.  This should be rechecked in 6 months to another year cholesterol is normal at 114.  Chemistry panel was normal with borderline blood sugar at 106.  Hemoglobin is normal at 13.3.     Resulted Orders   Lipid panel reflex to direct LDL Fasting   Result Value Ref Range    Cholesterol 114 <200 mg/dL    Triglycerides 89 <150 mg/dL      Comment:      Fasting specimen    HDL Cholesterol 41 >39 mg/dL    LDL Cholesterol Calculated 55 <100 mg/dL      Comment:      Desirable:       <100 mg/dl    Non HDL Cholesterol 73 <130 mg/dL   PSA, tumor marker   Result Value Ref Range    PSA 2.19 0 - 4 ug/L      Comment:      Assay Method:  Chemiluminescence using Siemens Vista analyzer   Comprehensive metabolic panel (BMP + Alb, Alk Phos, ALT, AST, Total. Bili, TP)   Result Value Ref Range    Sodium 141 133 - 144 mmol/L    Potassium 4.1 3.4 - 5.3 mmol/L    Chloride 107 94 - 109 mmol/L    Carbon Dioxide 29 20 - 32 mmol/L    Anion Gap 5 3 - 14 mmol/L    Glucose 106 (H) 70 - 99 mg/dL      Comment:      Fasting specimen    Urea Nitrogen 13 7 - 30 mg/dL    Creatinine 0.74 0.66 - 1.25 mg/dL    GFR Estimate >90 >60 mL/min/[1.73_m2]      Comment:      Non  GFR Calc  Starting 12/18/2018, serum creatinine based estimated GFR (eGFR) will be   calculated using the Chronic Kidney Disease Epidemiology Collaboration   (CKD-EPI) equation.      GFR Estimate If Black >90 >60 mL/min/[1.73_m2]      Comment:       GFR Calc  Starting 12/18/2018, serum creatinine based estimated GFR (eGFR) will be   calculated using the Chronic Kidney Disease Epidemiology Collaboration   (CKD-EPI) equation.      Calcium 8.9 8.5 - 10.1 mg/dL    Bilirubin Total 0.8 0.2 - 1.3 mg/dL    Albumin 3.8 3.4 - 5.0 g/dL    Protein  Total 6.9 6.8 - 8.8 g/dL    Alkaline Phosphatase 91 40 - 150 U/L    ALT 38 0 - 70 U/L    AST 21 0 - 45 U/L   Hemoglobin   Result Value Ref Range    Hemoglobin 13.3 13.3 - 17.7 g/dL       If you have any questions or concerns, please call the clinic at the number listed above.       Sincerely,        Avery Zapata MD

## 2019-01-31 NOTE — RESULT ENCOUNTER NOTE
2 weeks ago show PSA normal at 2.19 but is up from 2 years ago when it was 1.1.  This should be rechecked in 6 months to another year cholesterol is normal at 114.  Chemistry panel was normal with borderline blood sugar at 106.  Hemoglobin is normal at 13.3.

## 2019-04-26 DIAGNOSIS — M17.12 PRIMARY OSTEOARTHRITIS OF LEFT KNEE: ICD-10-CM

## 2019-04-29 RX ORDER — MELOXICAM 15 MG/1
TABLET ORAL
Qty: 90 TABLET | Refills: 0 | Status: SHIPPED | OUTPATIENT
Start: 2019-04-29 | End: 2019-07-26

## 2019-04-29 NOTE — TELEPHONE ENCOUNTER
"NBA  Last Written Prescription Date:  1/18/19  Last Fill Quantity: 90,  # refills: 0   Last office visit: 1/18/2019 with prescribing provider:     Future Office Visit:  NONE  Requested Prescriptions   Pending Prescriptions Disp Refills     meloxicam (MOBIC) 15 MG tablet [Pharmacy Med Name: MELOXICAM 15MG TABS] 90 tablet 0     Sig: TAKE ONE TABLET BY MOUTH ONCE DAILY       NSAID Medications Failed - 4/26/2019 10:58 AM        Failed - Normal CBC on file in past 12 months     Recent Labs   Lab Test 01/18/19  0738  09/23/16  0839   WBC  --   --  5.9   RBC  --   --  6.24*   HGB 13.3   < > 12.9*   HCT  --   --  39.3*   PLT  --   --  222    < > = values in this interval not displayed.           Passed - Blood pressure under 140/90 in past 12 months     BP Readings from Last 3 Encounters:   01/18/19 130/70   05/02/18 132/70   04/26/18 132/72           Passed - Normal ALT on file in past 12 months     Recent Labs   Lab Test 01/18/19  0738   ALT 38           Passed - Normal AST on file in past 12 months     Recent Labs   Lab Test 01/18/19  0738   AST 21           Passed - Recent (12 mo) or future (30 days) visit within the authorizing provider's specialty     Patient had office visit in the last 12 months or has a visit in the next 30 days with authorizing provider or within the authorizing provider's specialty.  See \"Patient Info\" tab in inbasket, or \"Choose Columns\" in Meds & Orders section of the refill encounter.              Passed - Patient is age 6-64 years        Passed - Medication is active on med list        Passed - Normal serum creatinine on file in past 12 months     Recent Labs   Lab Test 01/18/19  0738   CR 0.74           Routing refill request to provider for review/approval because:  Labs out of range: See above  FARIDEH Cordova              "

## 2019-07-26 DIAGNOSIS — M17.12 PRIMARY OSTEOARTHRITIS OF LEFT KNEE: ICD-10-CM

## 2019-07-29 RX ORDER — MELOXICAM 15 MG/1
TABLET ORAL
Qty: 30 TABLET | Refills: 0 | Status: SHIPPED | OUTPATIENT
Start: 2019-07-29 | End: 2019-11-08

## 2019-07-29 NOTE — TELEPHONE ENCOUNTER
"Requested Prescriptions   Pending Prescriptions Disp Refills     meloxicam (MOBIC) 15 MG tablet [Pharmacy Med Name: MELOXICAM 15MG TABS] 90 tablet 0     Sig: TAKE ONE TABLET BY MOUTH ONCE DAILY   Last Written Prescription Date:  4/29/2019  Last Fill Quantity: 90,  # refills: 0   Last office visit: 1/18/2019 with prescribing provider:     Future Office Visit:        NSAID Medications Failed - 7/26/2019  8:08 AM        Failed - Normal CBC on file in past 12 months     Recent Labs   Lab Test 01/18/19  0738  09/23/16  0839   WBC  --   --  5.9   RBC  --   --  6.24*   HGB 13.3   < > 12.9*   HCT  --   --  39.3*   PLT  --   --  222    < > = values in this interval not displayed.             Passed - Blood pressure under 140/90 in past 12 months     BP Readings from Last 3 Encounters:   01/18/19 130/70   05/02/18 132/70   04/26/18 132/72             Passed - Normal ALT on file in past 12 months     Recent Labs   Lab Test 01/18/19  0738   ALT 38           Passed - Normal AST on file in past 12 months     Recent Labs   Lab Test 01/18/19  0738   AST 21             Passed - Recent (12 mo) or future (30 days) visit within the authorizing provider's specialty     Patient had office visit in the last 12 months or has a visit in the next 30 days with authorizing provider or within the authorizing provider's specialty.  See \"Patient Info\" tab in inbasket, or \"Choose Columns\" in Meds & Orders section of the refill encounter.              Passed - Patient is age 6-64 years        Passed - Medication is active on med list        Passed - Normal serum creatinine on file in past 12 months     Recent Labs   Lab Test 01/18/19  0738   CR 0.74         Routing refill request to provider for review/approval because:  Labs not current:  CBC    T'd up 1 month for provider review.  Мария Carrion RN            "

## 2019-10-09 ENCOUNTER — OFFICE VISIT (OUTPATIENT)
Dept: FAMILY MEDICINE | Facility: CLINIC | Age: 54
End: 2019-10-09
Payer: COMMERCIAL

## 2019-10-09 VITALS
SYSTOLIC BLOOD PRESSURE: 122 MMHG | BODY MASS INDEX: 41.75 KG/M2 | DIASTOLIC BLOOD PRESSURE: 80 MMHG | HEART RATE: 120 BPM | HEIGHT: 73 IN | OXYGEN SATURATION: 96 % | WEIGHT: 315 LBS | TEMPERATURE: 97.6 F

## 2019-10-09 DIAGNOSIS — R25.2 HAND CRAMPS: ICD-10-CM

## 2019-10-09 DIAGNOSIS — J40 BRONCHITIS: Primary | ICD-10-CM

## 2019-10-09 PROCEDURE — 99214 OFFICE O/P EST MOD 30 MIN: CPT | Performed by: FAMILY MEDICINE

## 2019-10-09 ASSESSMENT — PATIENT HEALTH QUESTIONNAIRE - PHQ9: SUM OF ALL RESPONSES TO PHQ QUESTIONS 1-9: 3

## 2019-10-09 ASSESSMENT — MIFFLIN-ST. JEOR: SCORE: 2578.97

## 2019-10-09 NOTE — PROGRESS NOTES
Subjective     Sharif Giraldo is a 54 year old male who presents to clinic today for the following health issues:    HPI   Acute Illness   Acute illness concerns:   Onset: 10/5/2019    Fever: no    Chills/Sweats: YES    Headache (location?): no    Sinus Pressure:YES    Conjunctivitis:  no    Ear Pain: no    Rhinorrhea: YES    Congestion: YES    Sore Throat: no     Cough: YES-productive of yellow sputum    Wheeze: YES    Decreased Appetite: YES    Nausea: no     Vomiting: no     Diarrhea:  YES    Dysuria/Freq.: no    Fatigue/Achiness: YES    Sick/Strep Exposure: YES- colds      Therapies Tried and outcome: sudafed and mucinex.     SUBJECTIVE:  Sharif  is a 54 year old male who presents for: Symptoms as noted above also complaining of cramping in his left hand the base of his thumb up into the base of the index and middle finger.  He is right-handed.  He has had no known injury.  He has had an EMG on this before and there is signs of mild carpal tunnel syndrome but he does not report any numbness or tingling is mainly just cramping in the hand especially on a day that he works.    Past Medical History:   Diagnosis Date     HTN (hypertension) 2012     Morbid obesity with BMI of 60.0-69.9, adult (H)      LUH (obstructive sleep apnea) 5/2013    Severe      Past Surgical History:   Procedure Laterality Date     C AFF EYE MUSCLE SURG PROC UNLISTED  1970,1974     C ANESTH,REPAIR LO ABD HERNIA NOS  2000,2001    umbilical area     COLONOSCOPY  9/20/2013    Procedure: COLONOSCOPY;  Colonoscopy;  Surgeon: Avery Morales MD;  Location: [facility]     HC EXCISE EXCESS SKIN TISSUE,OTHER      Hx; staph complications of hernia surg     HC KNEE SCOPE, DIAGNOSTIC  1999,2000    Arthroscopy, Knee     HC REMOVAL OF TONSILS,<11 Y/O  1973    Tonsils <10y.o.     HC REPAIR INITIAL INCISIONAL HERNIA; INCARCERATED OR STRANGULATED  09/25/2002    Mesh repair of incarcerated incisional hernia, recurrent.     HERNIA REPAIR, INCISIONAL   10/09/08    Ventral herniorrhaphy/mesh placement.  Excision left elbow mass.     INJECT EPIDURAL CERVICAL N/A 4/12/2018    Procedure: INJECT EPIDURAL CERVICAL;  translaminar epidural injection at cervical 7 - throacic 1;  Surgeon: Phong Andrade MD;  Location: [facility]     Social History     Tobacco Use     Smoking status: Current Every Day Smoker     Packs/day: 0.01     Years: 10.00     Pack years: 0.10     Types: Cigars     Smokeless tobacco: Never Used     Tobacco comment:  cigars 2/ day   Substance Use Topics     Alcohol use: Yes     Alcohol/week: 0.0 standard drinks     Comment: 1 a week      Current Outpatient Medications   Medication Sig Dispense Refill     amoxicillin-clavulanate (AUGMENTIN) 875-125 MG tablet Take 1 tablet by mouth 2 times daily for 10 days 20 tablet 0     calcium carb 1250 mg, 500 mg [place],/vitamin D 200 units (OSCAL WITH D) 500-200 MG-UNIT per tablet Take 600mg daily 1 tablet      Calcium-Magnesium 300-300 MG TABS Take 300 mg by mouth daily       lisinopril-hydrochlorothiazide (PRINZIDE/ZESTORETIC) 20-25 MG tablet Take 1 tablet by mouth daily 90 tablet 3     Magnesium 400 MG TABS Take 400 mg % by mouth 2 times daily       meloxicam (MOBIC) 15 MG tablet TAKE ONE TABLET BY MOUTH ONCE DAILY 30 tablet 0     Vitamin D, Cholecalciferol, 400 UNITS TABS Take 400 Units by mouth daily       FLUoxetine (PROZAC) 40 MG capsule TAKE 1 CAPSULE BY MOUTH DAILY (Patient not taking: Reported on 1/18/2019) 30 capsule 1     Glucosamine-Chondroit-Vit C-Mn (GLUCOSAMINE 1500 COMPLEX) CAPS        order for DME Dispensed 1 Dorsal (Anterior) Night Splint, Size L/XL, with [facility] agreement signed by patient. Angy Garner CMA, November 30, 2015 (Patient not taking: Reported on 1/18/2019) 1 Device 0     raltegravir (ISENTRESS) 400 MG tablet Take 400 mg by mouth 2 times daily       tadalafil (CIALIS) 5 MG tablet Take 1 tablet (5 mg) by mouth daily Never use with nitroglycerin, terazosin or doxazosin. (Patient not taking:  "Reported on 1/18/2019) 30 tablet 1     testosterone cypionate (DEPOTESTOSTERONE) 200 MG/ML injection Inject 1 mL (200 mg) into the muscle every 28 days (Patient not taking: Reported on 10/9/2019) 1 mL 5       REVIEW OF SYSTEMS:   5 point ROS negative except as noted above in HPI, including Gen., Resp, CV, GI &  system review.     OBJECTIVE:  Vitals: /80 (BP Location: Left arm, Patient Position: Sitting, Cuff Size: Adult Large)   Pulse 120   Temp 97.6  F (36.4  C) (Temporal)   Ht 1.854 m (6' 1\")   Wt (!) 169.5 kg (373 lb 11.2 oz)   SpO2 96%   BMI 49.30 kg/m    BMI= Body mass index is 49.3 kg/m .  He is alert frequent coughing noted in the room.  Throat with some drainage.  Ears are clear.  Neck supple.  Lungs with a few rhonchi.  Heart regular rhythm.  Hand shows no deformity.  Negative Tinel sign.  Good range of motion of the wrist.   strength is okay.    ASSESSMENT:  #1 bronchitis #2 left hand cramping    PLAN:  Augmentin 875 twice daily over-the-counter cold and cough medicines as he is been taking follow-up for x-ray if not improving.  Will refer him to sports medicine regarding the hand he may have some tenosynovitis.    Weight management plan: Discussed healthy diet and exercise guidelines    Avery Zapata MD  [facility]              "

## 2019-10-09 NOTE — LETTER
My Depression Action Plan  Name: Sharif Giraldo   Date of Birth 1965  Date: 10/9/2019    My doctor: Avery Zapata   My clinic: [address] [address] [phone]          GREEN    ZONE   Good Control    What it looks like:     Things are going generally well. You have normal up s and down s. You may even feel depressed from time to time, but bad moods usually last less than a day.   What you need to do:  1. Continue to care for yourself (see self care plan)  2. Check your depression survival kit and update it as needed  3. Follow your physician s recommendations including any medication.  4. Do not stop taking medication unless you consult with your physician first.           YELLOW         ZONE Getting Worse    What it looks like:     Depression is starting to interfere with your life.     It may be hard to get out of bed; you may be starting to isolate yourself from others.    Symptoms of depression are starting to last most all day and this has happened for several days.     You may have suicidal thoughts but they are not constant.   What you need to do:     1. Call your care team, your response to treatment will improve if you keep your care team informed of your progress. Yellow periods are signs an adjustment may need to be made.     2. Continue your self-care, even if you have to fake it!    3. Talk to someone in your support network    4. Open up your depression survival kit           RED    ZONE Medical Alert - Get Help    What it looks like:     Depression is seriously interfering with your life.     You may experience these or other symptoms: You can t get out of bed most days, can t work or engage in other necessary activities, you have trouble taking care of basic hygiene, or basic responsibilities, thoughts of suicide or death that will not go away, self-injurious behavior.     What you need to do:  1. Call your care team and  request a same-day appointment. If they are not available (weekends or after hours) call your local crisis line, emergency room or 911.            Depression Self Care Plan / Survival Kit    Self-Care for Depression  Here s the deal. Your body and mind are really not as separate as most people think.  What you do and think affects how you feel and how you feel influences what you do and think. This means if you do things that people who feel good do, it will help you feel better.  Sometimes this is all it takes.  There is also a place for medication and therapy depending on how severe your depression is, so be sure to consult with your medical provider and/ or Behavioral Health Consultant if your symptoms are worsening or not improving.     In order to better manage my stress, I will:    Exercise  Get some form of exercise, every day. This will help reduce pain and release endorphins, the  feel good  chemicals in your brain. This is almost as good as taking antidepressants!  This is not the same as joining a gym and then never going! (they count on that by the way ) It can be as simple as just going for a walk or doing some gardening, anything that will get you moving.      Hygiene   Maintain good hygiene (Get out of bed in the morning, Make your bed, Brush your teeth, Take a shower, and Get dressed like you were going to work, even if you are unemployed).  If your clothes don't fit try to get ones that do.    Diet  I will strive to eat foods that are good for me, drink plenty of water, and avoid excessive sugar, caffeine, alcohol, and other mood-altering substances.  Some foods that are helpful in depression are: complex carbohydrates, B vitamins, flaxseed, fish or fish oil, fresh fruits and vegetables.    Psychotherapy  I agree to participate in Individual Therapy (if recommended).    Medication  If prescribed medications, I agree to take them.  Missing doses can result in serious side effects.  I understand that  drinking alcohol, or other illicit drug use, may cause potential side effects.  I will not stop my medication abruptly without first discussing it with my provider.    Staying Connected With Others  I will stay in touch with my friends, family members, and my primary care provider/team.    Use your imagination  Be creative.  We all have a creative side; it doesn t matter if it s oil painting, sand castles, or mud pies! This will also kick up the endorphins.    Witness Beauty  (AKA stop and smell the roses) Take a look outside, even in mid-winter. Notice colors, textures. Watch the squirrels and birds.     Service to others  Be of service to others.  There is always someone else in need.  By helping others we can  get out of ourselves  and remember the really important things.  This also provides opportunities for practicing all the other parts of the program.    Humor  Laugh and be silly!  Adjust your TV habits for less news and crime-drama and more comedy.    Control your stress  Try breathing deep, massage therapy, biofeedback, and meditation. Find time to relax each day.     My support system    Clinic Contact:  Phone number:    Contact 1:  Phone number:    Contact 2:  Phone number:    Mandaeism/:  Phone number:    Therapist:  Phone number:    Local crisis center:    Phone number:    Other community support:  Phone number:

## 2019-10-10 NOTE — PROGRESS NOTES
Sports Medicine Clinic Visit    PCP: Avery Zapata    CC: Patient presents with:  Left Hand - Pain  Right Hand - Pain      HPI:  Sharif Giraldo is a 54 year old male who is seen in consultation at the request of Dr. Zapata.   He notes left hand cramping that began ~ 6 months ago. His right hand began cramping a couple of days ago.  He notes that it is worse after using the hands a lot (for example with yard work).  He notes the fingers want to close as the hands cramp.  He feels he cramping in the palm of the hand and into the volar wrist.  He notes he gets the cramping 1-2 times per week and it happens mostly in the evening.  He rates the pain at a 6/10 at its worst and a 0/10 currently.  Symptoms are relieved with nothing. Symptoms are worsened by use of the hands. He endorses weakness and cramping.   He denies catching, locking, numbness and tingling.  Other treatment has included magnesium and wrist bracing at night.  He denies recent leg cramping. He has had CTS in the past in both hands, but did not require surgery.  His current symptoms feel different than this.      Review of Systems:  Musculoskeletal: as above  Remainder of review of systems is negative including constitutional, eyes, ENT, CV, pulmonary, GI, , endocrine, skin, hematologic, and neurologic except as noted in HPI or medical history.    History reviewed. No pertinent past surgical/medical/family/social history other than as mentioned in HPI.    Patient Active Problem List   Diagnosis     Umbilical hernia     Morbid obesity (H)     CARDIOVASCULAR SCREENING; LDL GOAL LESS THAN 130     Hypertension goal BP (blood pressure) < 140/90     Nocturia     Anemia     LUH (obstructive sleep apnea)-very severe ()     Sleep related hypoventilation/hypoxemia in other disease     Hypogonadism male     Thalassemia carrier     Major depressive disorder, recurrent episode, mild (H)     Osteoarthritis of spine with radiculopathy, cervical region      Carpal tunnel syndrome of left wrist     Cervical pain     Cervical radiculopathy     Past Medical History:   Diagnosis Date     HTN (hypertension) 2012     Morbid obesity with BMI of 60.0-69.9, adult (H)      LUH (obstructive sleep apnea) 5/2013    Severe      Past Surgical History:   Procedure Laterality Date     C AFF EYE MUSCLE SURG PROC UNLISTED  1970,1974     C ANESTH,REPAIR LO ABD HERNIA NOS  2000,2001    umbilical area     COLONOSCOPY  9/20/2013    Procedure: COLONOSCOPY;  Colonoscopy;  Surgeon: Avery Morales MD;  Location:  GI     HC EXCISE EXCESS SKIN TISSUE,OTHER      Hx; staph complications of hernia surg     HC KNEE SCOPE, DIAGNOSTIC  1999,2000    Arthroscopy, Knee     HC REMOVAL OF TONSILS,<13 Y/O  1973    Tonsils <10y.o.     HC REPAIR INITIAL INCISIONAL HERNIA; INCARCERATED OR STRANGULATED  09/25/2002    Mesh repair of incarcerated incisional hernia, recurrent.     HERNIA REPAIR, INCISIONAL  10/09/08    Ventral herniorrhaphy/mesh placement.  Excision left elbow mass.     INJECT EPIDURAL CERVICAL N/A 4/12/2018    Procedure: INJECT EPIDURAL CERVICAL;  translaminar epidural injection at cervical 7 - throacic 1;  Surgeon: Phong Andrade MD;  Location: [facility] OR     Family History   Problem Relation Age of Onset     Heart Disease Paternal Grandfather         heart attack     Cerebrovascular Disease Paternal Grandfather      Alzheimer Disease Paternal Grandfather      Heart Disease Maternal Grandmother         heart attack     Diabetes Maternal Grandmother         adult onset     Alzheimer Disease Father         snores     Alzheimer Disease Paternal Grandmother      Hypertension Mother      Diabetes Mother      Social History     Socioeconomic History     Marital status:      Spouse name: Amparo     Number of children: 0     Years of education: 16     Highest education level: Not on file   Occupational History     Occupation: [employer].     Employer: [employer]   Social Needs      Financial resource strain: Not on file     Food insecurity:     Worry: Not on file     Inability: Not on file     Transportation needs:     Medical: Not on file     Non-medical: Not on file   Tobacco Use     Smoking status: Current Every Day Smoker     Packs/day: 0.01     Years: 10.00     Pack years: 0.10     Types: Cigars     Smokeless tobacco: Never Used     Tobacco comment:  cigars 2/ day   Substance and Sexual Activity     Alcohol use: Yes     Alcohol/week: 0.0 standard drinks     Comment: 1 a week      Drug use: No     Sexual activity: Yes     Partners: Female   Lifestyle     Physical activity:     Days per week: Not on file     Minutes per session: Not on file     Stress: Not on file   Relationships     Social connections:     Talks on phone: Not on file     Gets together: Not on file     Attends Worship service: Not on file     Active member of club or organization: Not on file     Attends meetings of clubs or organizations: Not on file     Relationship status: Not on file     Intimate partner violence:     Fear of current or ex partner: Not on file     Emotionally abused: Not on file     Physically abused: Not on file     Forced sexual activity: Not on file   Other Topics Concern      Service No     Blood Transfusions No     Caffeine Concern Yes     Comment: coffee/tea/pop: 3/d       Occupational Exposure No     Hobby Hazards Yes     Comment:      Sleep Concern No     Stress Concern No     Weight Concern Yes     Comment: desire wt loss     Special Diet No     Back Care No     Exercise No     Bike Helmet No     Seat Belt Yes     Self-Exams Not Asked     Parent/sibling w/ CABG, MI or angioplasty before 65F 55M? Not Asked   Social History Narrative     Not on file       He does desk work.     Current Outpatient Medications   Medication     amoxicillin-clavulanate (AUGMENTIN) 875-125 MG tablet     calcium carb 1250 mg, 500 mg [place],/vitamin D 200 units (OSCAL WITH D) 500-200 MG-UNIT per tablet      "Calcium-Magnesium 300-300 MG TABS     FLUoxetine (PROZAC) 40 MG capsule     Glucosamine-Chondroit-Vit C-Mn (GLUCOSAMINE 1500 COMPLEX) CAPS     lisinopril-hydrochlorothiazide (PRINZIDE/ZESTORETIC) 20-25 MG tablet     Magnesium 400 MG TABS     meloxicam (MOBIC) 15 MG tablet     order for DME     raltegravir (ISENTRESS) 400 MG tablet     tadalafil (CIALIS) 5 MG tablet     testosterone cypionate (DEPOTESTOSTERONE) 200 MG/ML injection     Vitamin D, Cholecalciferol, 400 UNITS TABS     No current facility-administered medications for this visit.      Allergies   Allergen Reactions     No Known Drug Allergies          Objective:  /76   Ht 1.854 m (6' 1\")   Wt (!) 169.6 kg (373 lb 12.8 oz)   BMI 49.32 kg/m      General: Alert and in no distress    Head: Normocephalic, atraumatic  Eyes: no scleral icterus or conjunctival erythema   Oropharynx:  Mucous membranes moist  Skin: no erythema, petechiae, or jaundice  CV: regular rhythm by palpation, 2+ distal pulses  Resp: normal respiratory effort without conversational dyspnea   Psych: normal mood and affect     Neuro: Motor strength and sensation as noted below    Musculoskeletal:    Bilateral Wrist and Hand exam    Inspection:       No swelling, bruising or deformity bilaterally    Tenderness:  None    ROM:       Full and symmetric active range of motion of the forearm, wrist and digits bilaterally    Strength:  Forearm supination 5/5 bilaterally  Forearm pronation 5/5 bilaterally  Wrist extension 5/5 bilaterally  Wrist flexion 5/5 bilaterally   strength 5/5 bilaterally  Finger abduction 5/5 bilaterally    Neurovascular:       2+ radial pulses bilaterally and normal sensation to light touch in the radial, median and ulnar nerve distributions      Radiology:  No imaging obtained today    Assessment:  1. Left hand weakness    2. Muscle cramps        Plan:  Discussed the assessment with the patient and developed a plan together:  -Occupational therapy referral.  " Please do 5-6 days of exercises per week between formal sessions and the home exercises they provide.  -Ice or heat 15-20 minutes as needed (Avoid sleeping on a heating pad or ice).  -Patient's preferred over the counter medication as directed on packaging as needed for pain or soreness.  -Ordered a comprehensive metabolic panel to evaluate for electrolyte abnormalities.  He planning on obtaining this at his next physical.  -Also discussed EMG to evaluate for a myopathic process.      -Follow up as needed if symptoms fail to improve or worsen.  Please call with questions or concerns.        Anne-Marie Roth MD, CAQ Sports Medicine  [facility]

## 2019-10-11 ENCOUNTER — OFFICE VISIT (OUTPATIENT)
Dept: ORTHOPEDICS | Facility: CLINIC | Age: 54
End: 2019-10-11
Payer: COMMERCIAL

## 2019-10-11 VITALS
WEIGHT: 315 LBS | HEIGHT: 73 IN | BODY MASS INDEX: 41.75 KG/M2 | DIASTOLIC BLOOD PRESSURE: 76 MMHG | SYSTOLIC BLOOD PRESSURE: 134 MMHG

## 2019-10-11 DIAGNOSIS — R29.898 LEFT HAND WEAKNESS: Primary | ICD-10-CM

## 2019-10-11 DIAGNOSIS — R25.2 MUSCLE CRAMPS: ICD-10-CM

## 2019-10-11 PROCEDURE — 99203 OFFICE O/P NEW LOW 30 MIN: CPT | Performed by: PHYSICAL MEDICINE & REHABILITATION

## 2019-10-11 ASSESSMENT — MIFFLIN-ST. JEOR: SCORE: 2579.43

## 2019-10-11 NOTE — LETTER
10/11/2019         RE: Sharif Giraldo  [address] [facility] [address] [facility] [address]        Dear Colleague,    Thank you for referring your patient, Sharif Giraldo, to the [facility]. Please see a copy of my visit note below.    Sports Medicine Clinic Visit    PCP: Avery Zapata    CC: Patient presents with:  Left Hand - Pain  Right Hand - Pain      HPI:  Sharif Giraldo is a 54 year old male who is seen in consultation at the request of Dr. Zapata.   He notes left hand cramping that began ~ 6 months ago. His right hand began cramping a couple of days ago.  He notes that it is worse after using the hands a lot (for example with yard work).  He notes the fingers want to close as the hands cramp.  He feels he cramping in the palm of the hand and into the volar wrist.  He notes he gets the cramping 1-2 times per week and it happens mostly in the evening.  He rates the pain at a 6/10 at its worst and a 0/10 currently.  Symptoms are relieved with nothing. Symptoms are worsened by use of the hands. He endorses weakness and cramping.   He denies catching, locking, numbness and tingling.  Other treatment has included magnesium and wrist bracing at night.  He denies recent leg cramping. He has had CTS in the past in both hands, but did not require surgery.  His current symptoms feel different than this.      Review of Systems:  Musculoskeletal: as above  Remainder of review of systems is negative including constitutional, eyes, ENT, CV, pulmonary, GI, , endocrine, skin, hematologic, and neurologic except as noted in HPI or medical history.    History reviewed. No pertinent past surgical/medical/family/social history other than as mentioned in HPI.    Patient Active Problem List   Diagnosis     Umbilical hernia     Morbid obesity (H)     CARDIOVASCULAR SCREENING; LDL GOAL LESS THAN 130     Hypertension goal BP (blood pressure) < 140/90     Nocturia     Anemia     LUH (obstructive sleep  apnea)-very severe ()     Sleep related hypoventilation/hypoxemia in other disease     Hypogonadism male     Thalassemia carrier     Major depressive disorder, recurrent episode, mild (H)     Osteoarthritis of spine with radiculopathy, cervical region     Carpal tunnel syndrome of left wrist     Cervical pain     Cervical radiculopathy     Past Medical History:   Diagnosis Date     HTN (hypertension) 2012     Morbid obesity with BMI of 60.0-69.9, adult (H)      LUH (obstructive sleep apnea) 5/2013    Severe      Past Surgical History:   Procedure Laterality Date     C AFF EYE MUSCLE SURG PROC UNLISTED  1970,1974     C ANESTH,REPAIR LO ABD HERNIA NOS  2000,2001    umbilical area     COLONOSCOPY  9/20/2013    Procedure: COLONOSCOPY;  Colonoscopy;  Surgeon: Avery Morales MD;  Location:  GI     HC EXCISE EXCESS SKIN TISSUE,OTHER      Hx; staph complications of hernia surg     HC KNEE SCOPE, DIAGNOSTIC  1999,2000    Arthroscopy, Knee     HC REMOVAL OF TONSILS,<9 Y/O  1973    Tonsils <10y.o.     HC REPAIR INITIAL INCISIONAL HERNIA; INCARCERATED OR STRANGULATED  09/25/2002    Mesh repair of incarcerated incisional hernia, recurrent.     HERNIA REPAIR, INCISIONAL  10/09/08    Ventral herniorrhaphy/mesh placement.  Excision left elbow mass.     INJECT EPIDURAL CERVICAL N/A 4/12/2018    Procedure: INJECT EPIDURAL CERVICAL;  translaminar epidural injection at cervical 7 - throacic 1;  Surgeon: Phong Andrade MD;  Location:  OR     Family History   Problem Relation Age of Onset     Heart Disease Paternal Grandfather         heart attack     Cerebrovascular Disease Paternal Grandfather      Alzheimer Disease Paternal Grandfather      Heart Disease Maternal Grandmother         heart attack     Diabetes Maternal Grandmother         adult onset     Alzheimer Disease Father         snores     Alzheimer Disease Paternal Grandmother      Hypertension Mother      Diabetes Mother      Social History      Socioeconomic History     Marital status:      Spouse name: Amparo     Number of children: 0     Years of education: 16     Highest education level: Not on file   Occupational History     Occupation: [employer].     Employer: [employer]   Social Needs     Financial resource strain: Not on file     Food insecurity:     Worry: Not on file     Inability: Not on file     Transportation needs:     Medical: Not on file     Non-medical: Not on file   Tobacco Use     Smoking status: Current Every Day Smoker     Packs/day: 0.01     Years: 10.00     Pack years: 0.10     Types: Cigars     Smokeless tobacco: Never Used     Tobacco comment:  cigars 2/ day   Substance and Sexual Activity     Alcohol use: Yes     Alcohol/week: 0.0 standard drinks     Comment: 1 a week      Drug use: No     Sexual activity: Yes     Partners: Female   Lifestyle     Physical activity:     Days per week: Not on file     Minutes per session: Not on file     Stress: Not on file   Relationships     Social connections:     Talks on phone: Not on file     Gets together: Not on file     Attends Nondenominational service: Not on file     Active member of club or organization: Not on file     Attends meetings of clubs or organizations: Not on file     Relationship status: Not on file     Intimate partner violence:     Fear of current or ex partner: Not on file     Emotionally abused: Not on file     Physically abused: Not on file     Forced sexual activity: Not on file   Other Topics Concern      Service No     Blood Transfusions No     Caffeine Concern Yes     Comment: coffee/tea/pop: 3/d       Occupational Exposure No     Hobby Hazards Yes     Comment:      Sleep Concern No     Stress Concern No     Weight Concern Yes     Comment: desire wt loss     Special Diet No     Back Care No     Exercise No     Bike Helmet No     Seat Belt Yes     Self-Exams Not Asked     Parent/sibling w/ CABG, MI or angioplasty before 65F 55M? Not Asked   Social  "History Narrative     Not on file       He does desk work.     Current Outpatient Medications   Medication     amoxicillin-clavulanate (AUGMENTIN) 875-125 MG tablet     calcium carb 1250 mg, 500 mg [place],/vitamin D 200 units (OSCAL WITH D) 500-200 MG-UNIT per tablet     Calcium-Magnesium 300-300 MG TABS     FLUoxetine (PROZAC) 40 MG capsule     Glucosamine-Chondroit-Vit C-Mn (GLUCOSAMINE 1500 COMPLEX) CAPS     lisinopril-hydrochlorothiazide (PRINZIDE/ZESTORETIC) 20-25 MG tablet     Magnesium 400 MG TABS     meloxicam (MOBIC) 15 MG tablet     order for DME     raltegravir (ISENTRESS) 400 MG tablet     tadalafil (CIALIS) 5 MG tablet     testosterone cypionate (DEPOTESTOSTERONE) 200 MG/ML injection     Vitamin D, Cholecalciferol, 400 UNITS TABS     No current facility-administered medications for this visit.      Allergies   Allergen Reactions     No Known Drug Allergies          Objective:  /76   Ht 1.854 m (6' 1\")   Wt (!) 169.6 kg (373 lb 12.8 oz)   BMI 49.32 kg/m       General: Alert and in no distress    Head: Normocephalic, atraumatic  Eyes: no scleral icterus or conjunctival erythema   Oropharynx:  Mucous membranes moist  Skin: no erythema, petechiae, or jaundice  CV: regular rhythm by palpation, 2+ distal pulses  Resp: normal respiratory effort without conversational dyspnea   Psych: normal mood and affect     Neuro: Motor strength and sensation as noted below    Musculoskeletal:    Bilateral Wrist and Hand exam    Inspection:       No swelling, bruising or deformity bilaterally    Tenderness:  None    ROM:       Full and symmetric active range of motion of the forearm, wrist and digits bilaterally    Strength:  Forearm supination 5/5 bilaterally  Forearm pronation 5/5 bilaterally  Wrist extension 5/5 bilaterally  Wrist flexion 5/5 bilaterally   strength 5/5 bilaterally  Finger abduction 5/5 bilaterally    Neurovascular:       2+ radial pulses bilaterally and normal sensation to light touch in the " radial, median and ulnar nerve distributions      Radiology:  No imaging obtained today    Assessment:  1. Left hand weakness    2. Muscle cramps        Plan:  Discussed the assessment with the patient and developed a plan together:  -Occupational therapy referral.  Please do 5-6 days of exercises per week between formal sessions and the home exercises they provide.  -Ice or heat 15-20 minutes as needed (Avoid sleeping on a heating pad or ice).  -Patient's preferred over the counter medication as directed on packaging as needed for pain or soreness.  -Ordered a comprehensive metabolic panel to evaluate for electrolyte abnormalities.  He planning on obtaining this at his next physical.  -Also discussed EMG to evaluate for a myopathic process.      -Follow up as needed if symptoms fail to improve or worsen.  Please call with questions or concerns.        Anne-Marie Roth MD, [facility] Sports Medicine  [facility]    Again, thank you for allowing me to participate in the care of your patient.        Sincerely,        Jessica Roth MD

## 2019-10-11 NOTE — PATIENT INSTRUCTIONS
-Occupational therapy referral.  Please do 5-6 days of exercises per week between formal sessions and the home exercises they provide.  -Ice or heat 15-20 minutes as needed (Avoid sleeping on a heating pad or ice).  -Patient's preferred over the counter medication as directed on packaging as needed for pain or soreness.  -Ordered a comprehensive metabolic panel.  You are planning on obtaining this at your next physical.  -Also discussed EMG.    -Follow up as needed if symptoms fail to improve or worsen.  Please call with questions or concerns.

## 2019-11-08 ENCOUNTER — HOSPITAL ENCOUNTER (OUTPATIENT)
Dept: OCCUPATIONAL THERAPY | Facility: CLINIC | Age: 54
Setting detail: THERAPIES SERIES
End: 2019-11-08
Attending: PHYSICAL MEDICINE & REHABILITATION
Payer: COMMERCIAL

## 2019-11-08 DIAGNOSIS — M17.12 PRIMARY OSTEOARTHRITIS OF LEFT KNEE: ICD-10-CM

## 2019-11-08 DIAGNOSIS — R25.2 MUSCLE CRAMPS: ICD-10-CM

## 2019-11-08 DIAGNOSIS — R29.898 LEFT HAND WEAKNESS: ICD-10-CM

## 2019-11-08 PROCEDURE — 97110 THERAPEUTIC EXERCISES: CPT | Mod: GO | Performed by: OCCUPATIONAL THERAPIST

## 2019-11-08 PROCEDURE — 97165 OT EVAL LOW COMPLEX 30 MIN: CPT | Mod: GO | Performed by: OCCUPATIONAL THERAPIST

## 2019-11-08 PROCEDURE — 97026 INFRARED THERAPY: CPT | Mod: GO | Performed by: OCCUPATIONAL THERAPIST

## 2019-11-08 RX ORDER — MELOXICAM 15 MG/1
TABLET ORAL
Qty: 30 TABLET | Refills: 0 | Status: SHIPPED | OUTPATIENT
Start: 2019-11-08 | End: 2020-02-17

## 2019-11-08 NOTE — TELEPHONE ENCOUNTER
"Requested Prescriptions   Pending Prescriptions Disp Refills     meloxicam (MOBIC) 15 MG tablet [Pharmacy Med Name: MELOXICAM 15MG TABS] 30 tablet 0     Sig: TAKE ONE TABLET BY MOUTH ONCE DAILY   Last Written Prescription Date:  7/29/19  Last Fill Quantity: 30,  # refills: 0   Last office visit: 10/9/2019 with prescribing provider:  Benny   Future Office Visit:        NSAID Medications Failed - 11/8/2019 10:49 AM        Failed - Normal CBC on file in past 12 months     Recent Labs   Lab Test 01/18/19  0738  09/23/16  0839   WBC  --   --  5.9   RBC  --   --  6.24*   HGB 13.3   < > 12.9*   HCT  --   --  39.3*   PLT  --   --  222    < > = values in this interval not displayed.             Passed - Blood pressure under 140/90 in past 12 months     BP Readings from Last 3 Encounters:   10/11/19 134/76   10/09/19 122/80   01/18/19 130/70             Passed - Normal ALT on file in past 12 months     Recent Labs   Lab Test 01/18/19  0738   ALT 38           Passed - Normal AST on file in past 12 months     Recent Labs   Lab Test 01/18/19  0738   AST 21           Passed - Recent (12 mo) or future (30 days) visit within the authorizing provider's specialty     Patient has had an office visit with the authorizing provider or a provider within the authorizing providers department within the previous 12 mos or has a future within next 30 days. See \"Patient Info\" tab in inbasket, or \"Choose Columns\" in Meds & Orders section of the refill encounter.              Passed - Patient is age 6-64 years        Passed - Medication is active on med list        Passed - Normal serum creatinine on file in past 12 months     Recent Labs   Lab Test 01/18/19  0738   CR 0.74             "

## 2019-11-08 NOTE — PROGRESS NOTES
Occupational Therapy Evaluation       11/08/19 0947   General Information/History   Start Of Care Date 11/08/19   Referring Physician Dr Jessica Roth   Orders Evaluate And Treat As Indicated   Orders Date 11/11/19   Medical Diagnosis left hand weakness R29.898, muscle cramps R25.2   Additional Occupational Profile Info/Pertinent history of current problem Copy physician note dated; 10/11/19: Sharif Giraldo is a 54 year old male who is seen in consultation at the request of Dr. Zapata.   He notes left hand cramping that began ~ 6 months ago. His right hand began cramping a couple of days ago.  He notes that it is worse after using the hands a lot (for example with yard work).  He notes the fingers want to close as the hands cramp.  He feels he cramping in the palm of the hand and into the volar wrist.  He notes he gets the cramping 1-2 times per week and it happens mostly in the evening.  He rates the pain at a 6/10 at its worst and a 0/10 currently.  Symptoms are relieved with nothing. Symptoms are worsened by use of the hands. He endorses weakness and cramping.   He denies catching, locking, numbness and tingling.  Other treatment has included magnesium and wrist bracing at night.  He denies recent leg cramping. He has had CTS in the past in both hands, but did not require surgery.  His current symptoms feel different than this.  11/8/19 OT eval:  Patient did have to move and readjust his head/neck positioning throughout the eval.  He reported previous neck pain and issues.  Neck tightness may be contributing to arm tightness and cramping. Decresaed functional use of the hands for BADL/IADLs.   Previous treatment or current condition wrist brace, left   How/Where did it occur Other;With repetition/overuse  (computer)   Occurence comment increased hand triggering and cramping with increased grasp work.   Onset date of current episode/exacerbation 05/08/19   Chronicity New   Hand Dominance Right   Affected  "side Left   Functional limitations perform activities of daily living;perform required work activities   Reported Symptoms Pain;Loss of Motion/Stiffness;Locking;Loss of strength   Important Activities hunting, work/computers   Level of functions comments UEFI: 74/80   Patient role/Employment history Employed   Occupation payment design engeer   Employment Status Working in normal job without restrictions   Patient/Family goals statement \"get rid of the cramping\"   Fall Risk Screen   Fall screen completed by OT   Have you fallen 2 or more times in the past year? No   Have you fallen and had an injury in the past year? No   Is patient a fall risk? No   Pain   Pain Primary Pain Report   Primary Pain Report   Location left   Radiation Hand;Volar Forearm;Wrist   Pain Quality Cramping;Sharp   Frequency Intermittent   Scale 1/10   Pain Is Worse In The P.m.   Pain Is Exacerbated By Rest;Activity/movement   Pain Is Relieved By Splints   Progression Since Onset Gradually Improving   ROM   ROM AROM   AROM   Comments No formal measurements taken this date.  Pt does have limited end range in wrist extension and supination.  Tight left common flexor mm groups.   Strength   Strength Strength    Avg - Left 75#    Avg - Right 85#   Lateral Pinch - Left 13#   Lateral Pinch - Right 22#   3 Point Pinch - Left 16#   3 Point Pinch - Right 21#   Education Assessment   Preferred Learning Style Listening;Demonstration   Barriers to Learning No barriers   Therapy Interventions   Planned Therapy Interventions Ultrasound;Strengthening;ROM;Stretching;Manual Therapy;Home Program;Light Therapy;Paraffin;Ergonomic Patient Education   Clinical Impression   Criteria for Skilled Therapeutic Interventions Met yes;treatment indicated   OT Diagnosis Increased left common flexor mm groups and cervical issues are reducing his pain free functional use of the left hand for work, home and leisure activiites.   Influenced by the following impairments " Pain;Decreased range of motion;Decreased strength   Assessment of Occupational Performance 1-3 Performance Deficits   Identified Performance Deficits work, home mgt and leisure activiites   Clinical Decision Making (Complexity) Low complexity   Therapy Frequency 1x week    Predicted Duration of Therapy Intervention (days/wks) 8 weeks   Risks and Benefits of Treatment have been explained. Yes   Patient, Family & other staff in agreement with plan of care Yes   Hand Eval Goals   Hand Eval Goals 1;2;3   Hand Goal 1   Goal Identifier home mgmt   Goal Description Paulo will report no left hand cramping or locking of the fingers when participating in sustained grasp home mgmt task ie yardwork or meal prepr/clean up.   Target Date 01/08/20   Hand Goal 2   Goal Identifier work   Goal Description Paulo will report 100% use of ergonomic positioning and completing full arm/hand stretch up to 5x during his work day to increase extensibility and soft tissue response and decrease triggering/cramping of the left hand.   Target Date 01/08/20   Hand Goal 3   Goal Identifier leisure   Goal Description Paulo will increase left hand pinch strength by 3-5#; in order to be able to manipulate necessary items for hunting and other sports/leisure activities.   Target Date 01/08/20   Total Evaluation Time   OT Eval, Low Complexity Minutes (43841) 30       Thank you for referring Paulo  To OT services to assist with decrease tightness and cramping of the left hand to improve functional use for BADL/IADLs.    If you have any questions or concerns, please contact me at [phone].    Tisha Wolf MA, OTR/L  [facility]

## 2019-11-08 NOTE — TELEPHONE ENCOUNTER
Routing refill request to provider for review/approval because:  Labs not current:  CBC    Inocencia Wilson RN on 11/8/2019 at 11:34 AM

## 2019-11-22 ENCOUNTER — HOSPITAL ENCOUNTER (OUTPATIENT)
Dept: OCCUPATIONAL THERAPY | Facility: CLINIC | Age: 54
Setting detail: THERAPIES SERIES
End: 2019-11-22
Attending: PHYSICAL MEDICINE & REHABILITATION
Payer: COMMERCIAL

## 2019-11-22 PROCEDURE — 97035 APP MDLTY 1+ULTRASOUND EA 15: CPT | Mod: GO | Performed by: OCCUPATIONAL THERAPIST

## 2019-11-22 PROCEDURE — 97140 MANUAL THERAPY 1/> REGIONS: CPT | Mod: GO | Performed by: OCCUPATIONAL THERAPIST

## 2019-11-22 PROCEDURE — 97110 THERAPEUTIC EXERCISES: CPT | Mod: GO | Performed by: OCCUPATIONAL THERAPIST

## 2019-11-29 ENCOUNTER — HOSPITAL ENCOUNTER (OUTPATIENT)
Dept: OCCUPATIONAL THERAPY | Facility: CLINIC | Age: 54
Setting detail: THERAPIES SERIES
End: 2019-11-29
Attending: PHYSICAL MEDICINE & REHABILITATION
Payer: COMMERCIAL

## 2019-11-29 PROCEDURE — 97110 THERAPEUTIC EXERCISES: CPT | Mod: GO | Performed by: OCCUPATIONAL THERAPIST

## 2019-11-29 PROCEDURE — 97140 MANUAL THERAPY 1/> REGIONS: CPT | Mod: GO | Performed by: OCCUPATIONAL THERAPIST

## 2019-11-29 PROCEDURE — 97035 APP MDLTY 1+ULTRASOUND EA 15: CPT | Mod: GO | Performed by: OCCUPATIONAL THERAPIST

## 2019-12-06 ENCOUNTER — HOSPITAL ENCOUNTER (OUTPATIENT)
Dept: OCCUPATIONAL THERAPY | Facility: CLINIC | Age: 54
Setting detail: THERAPIES SERIES
End: 2019-12-06
Attending: PHYSICAL MEDICINE & REHABILITATION
Payer: COMMERCIAL

## 2019-12-06 PROCEDURE — 97140 MANUAL THERAPY 1/> REGIONS: CPT | Mod: GO | Performed by: OCCUPATIONAL THERAPIST

## 2019-12-06 PROCEDURE — 97035 APP MDLTY 1+ULTRASOUND EA 15: CPT | Mod: GO | Performed by: OCCUPATIONAL THERAPIST

## 2019-12-06 PROCEDURE — 97110 THERAPEUTIC EXERCISES: CPT | Mod: GO | Performed by: OCCUPATIONAL THERAPIST

## 2019-12-13 ENCOUNTER — HOSPITAL ENCOUNTER (OUTPATIENT)
Dept: OCCUPATIONAL THERAPY | Facility: CLINIC | Age: 54
Setting detail: THERAPIES SERIES
End: 2019-12-13
Attending: PHYSICAL MEDICINE & REHABILITATION
Payer: COMMERCIAL

## 2019-12-13 PROCEDURE — 97110 THERAPEUTIC EXERCISES: CPT | Mod: GO | Performed by: OCCUPATIONAL THERAPIST

## 2019-12-13 PROCEDURE — 97140 MANUAL THERAPY 1/> REGIONS: CPT | Mod: GO | Performed by: OCCUPATIONAL THERAPIST

## 2019-12-13 PROCEDURE — 97035 APP MDLTY 1+ULTRASOUND EA 15: CPT | Mod: GO | Performed by: OCCUPATIONAL THERAPIST

## 2019-12-20 ENCOUNTER — HOSPITAL ENCOUNTER (OUTPATIENT)
Dept: OCCUPATIONAL THERAPY | Facility: CLINIC | Age: 54
Setting detail: THERAPIES SERIES
End: 2019-12-20
Attending: PHYSICAL MEDICINE & REHABILITATION
Payer: COMMERCIAL

## 2019-12-20 PROCEDURE — 97140 MANUAL THERAPY 1/> REGIONS: CPT | Mod: GO | Performed by: OCCUPATIONAL THERAPIST

## 2019-12-20 PROCEDURE — 97110 THERAPEUTIC EXERCISES: CPT | Mod: GO | Performed by: OCCUPATIONAL THERAPIST

## 2019-12-20 PROCEDURE — 97035 APP MDLTY 1+ULTRASOUND EA 15: CPT | Mod: GO | Performed by: OCCUPATIONAL THERAPIST

## 2019-12-20 NOTE — PROGRESS NOTES
Outpatient Occupational Therapy Discharge Note     Patient: Sharif Giraldo  : 1965    Beginning/End Dates of Reporting Period:  19 to 2019  Pt seen for 6 OT treatment sessions since SOC.    Referring Provider: Dr Jessica Roth    Therapy Diagnosis: left arm muscle cramps; pain and mm tightness effecting his functional use for BADL/IADLs.    Client Self Report: Discharge this date, per pt request.  He states feels better, with less hand cramping and pain.    Objective Measurements:     Objective Measure: POG    Details: left 68# slightly down     Objective Measure: Pinch   Details: lateral 13# and 3 point 13.5# Norm     Objective Measure: UE and hand; cramps/contracture/spams   Details: improved, slight cramping at times during the noc reported, he continues to wear wrist braces at noc as needed     Objective Measure: UEFI   Details: 75/80  Improved            Goals:     Hand Goal 1   Goal Identifier home mgmt   Goal Description Paulo will report no left hand cramping or locking of the fingers when participating in sustained grasp home mgmt task ie yardwork or meal prepr/clean up.   Target Date 20  Goal met   Hand Goal 2   Goal Identifier work   Goal Description Paulo will report 100% use of ergonomic positioning and completing full arm/hand stretch up to 5x during his work day to increase extensibility and soft tissue response and decrease triggering/cramping of the left hand.   Target Date 20  Improved   Hand Goal 3   Goal Identifier leisure   Goal Description Paulo will increase left hand pinch strength by 3-5#; in order to be able to manipulate necessary items for hunting and other sports/leisure activities.   Target Date 20   Slight improved         Progress Toward Goals:   Progress this reporting period: refer above    Plan:  Discharge from therapy.    Reason for Discharge: Patient chooses to discontinue therapy.      Discharge Plan: Patient to continue home  program.    Thank you for referring Paulo  To OT services to assist with decrease hand cramping and improve function.    If you have any questions or concerns, please contact me at [phone].    Tisha Wolf MA, OTR/[facility]

## 2020-02-14 NOTE — PROGRESS NOTES
SUBJECTIVE:   CC: Sharif Giraldo is an 54 year old male who presents for preventative health visit.     Patient states that he has been experiencing chest heaviness for the last couple of months. Denies any pain or increased SOB.   Can last several minutes to several hours.  Not necessarily with activity.  Family history of stroke and heart disease.          Healthy Habits:     Getting at least 3 servings of Calcium per day:  Yes    Bi-annual eye exam:  Yes    Dental care twice a year:  Yes    Sleep apnea or symptoms of sleep apnea:  Sleep apnea    Diet:  Regular (no restrictions)    Frequency of exercise:  None    Taking medications regularly:  Yes    Medication side effects:  Not applicable    PHQ-2 Total Score: 2    Additional concerns today:  No              Today's PHQ-2 Score:   PHQ-2 ( 1999 [employer]) 2/17/2020   Q1: Little interest or pleasure in doing things 1   Q2: Feeling down, depressed or hopeless 1   PHQ-2 Score 2   Q1: Little interest or pleasure in doing things Several days   Q2: Feeling down, depressed or hopeless Several days   PHQ-2 Score 2       Abuse: Current or Past(Physical, Sexual or Emotional)- No  Do you feel safe in your environment? Yes    Have you ever done Advance Care Planning? (For example, a Health Directive, POLST, or a discussion with a medical provider or your loved ones about your wishes): No, advance care planning information given to patient to review.  Patient plans to discuss their wishes with loved ones or provider.      Social History     Tobacco Use     Smoking status: Current Every Day Smoker     Packs/day: 0.01     Years: 10.00     Pack years: 0.10     Types: Cigars     Smokeless tobacco: Never Used     Tobacco comment:  cigars 2/ day   Substance Use Topics     Alcohol use: Yes     Alcohol/week: 0.0 standard drinks     Comment: 1 a week      If you drink alcohol do you typically have >3 drinks per day or >7 drinks per week? No    Alcohol Use 2/17/2020   Prescreen: >3  drinks/day or >7 drinks/week? No   Prescreen: >3 drinks/day or >7 drinks/week? -   No flowsheet data found.    Last PSA:   PSA   Date Value Ref Range Status   01/18/2019 2.19 0 - 4 ug/L Final     Comment:     Assay Method:  Chemiluminescence using Siemens Vista analyzer       Reviewed orders with patient. Reviewed health maintenance and updated orders accordingly - Yes      Reviewed and updated as needed this visit by clinical staff  Tobacco  Allergies  Meds  Med Hx  Surg Hx  Fam Hx  Soc Hx        Reviewed and updated as needed this visit by Provider        Past Medical History:   Diagnosis Date     HTN (hypertension) 2012     Morbid obesity with BMI of 60.0-69.9, adult (H)      LUH (obstructive sleep apnea) 5/2013    Severe       Past Surgical History:   Procedure Laterality Date     C AFF EYE MUSCLE SURG PROC UNLISTED  1970,1974     C ANESTH,REPAIR LO ABD HERNIA NOS  2000,2001    umbilical area     COLONOSCOPY  9/20/2013    Procedure: COLONOSCOPY;  Colonoscopy;  Surgeon: Avery Morales MD;  Location:  GI     HC EXCISE EXCESS SKIN TISSUE,OTHER      Hx; staph complications of hernia surg     HC KNEE SCOPE, DIAGNOSTIC  1999,2000    Arthroscopy, Knee     HC REMOVAL OF TONSILS,<13 Y/O  1973    Tonsils <10y.o.     HC REPAIR INITIAL INCISIONAL HERNIA; INCARCERATED OR STRANGULATED  09/25/2002    Mesh repair of incarcerated incisional hernia, recurrent.     HERNIA REPAIR, INCISIONAL  10/09/08    Ventral herniorrhaphy/mesh placement.  Excision left elbow mass.     INJECT EPIDURAL CERVICAL N/A 4/12/2018    Procedure: INJECT EPIDURAL CERVICAL;  translaminar epidural injection at cervical 7 - throacic 1;  Surgeon: Phong Andrade MD;  Location:  OR       Review of Systems   Constitutional: Negative for chills and fever.   HENT: Negative for congestion, ear pain, hearing loss and sore throat.    Eyes: Negative for pain and visual disturbance.   Respiratory: Positive for shortness of breath. Negative for  "cough.    Cardiovascular: Positive for palpitations and peripheral edema. Negative for chest pain.   Gastrointestinal: Negative for abdominal pain, constipation, diarrhea, heartburn, hematochezia and nausea.   Genitourinary: Positive for impotence. Negative for discharge, dysuria, frequency, genital sores, hematuria and urgency.   Musculoskeletal: Positive for arthralgias and joint swelling. Negative for myalgias.   Skin: Negative for rash.   Neurological: Negative for dizziness, weakness, headaches and paresthesias.   Psychiatric/Behavioral: Positive for mood changes. The patient is not nervous/anxious.          OBJECTIVE:   /84 (BP Location: Left arm, Patient Position: Sitting, Cuff Size: Adult Large)   Pulse 116   Temp 97.9  F (36.6  C) (Temporal)   Resp 16   Ht 1.854 m (6' 1\")   Wt (!) 181.7 kg (400 lb 9.6 oz)   SpO2 96%   BMI 52.85 kg/m      Physical Exam  GENERAL: healthy, alert and no distress  EYES: Eyes grossly normal to inspection, PERRL and conjunctivae and sclerae normal  HENT: ear canals and TM's normal, nose and mouth without ulcers or lesions  NECK: no adenopathy, no asymmetry, masses, or scars and thyroid normal to palpation  RESP: lungs clear to auscultation - no rales, rhonchi or wheezes  CV: regular rate and rhythm, normal S1 S2, no S3 or S4, no murmur, click or rub, no peripheral edema and peripheral pulses strong  ABDOMEN: soft, nontender, no hepatosplenomegaly, no masses and bowel sounds normal  MS: no gross musculoskeletal defects noted, no edema  SKIN: no suspicious lesions or rashes  NEURO: Normal strength and tone, mentation intact and speech normal  PSYCH: mentation appears normal, affect normal/bright        ASSESSMENT/PLAN:   1. Routine general medical examination at a health care facility  See below    2. Chest pain, unspecified type  I feel we need to work this up.  We will set him up for a Lexiscan test his knees are really bad and his weight prevents him from doing a " "treadmill.  - NM Lexiscan stress test; Future    3. Primary osteoarthritis of left knee  Severe arthritis of the knees causes him to limp.  - meloxicam (MOBIC) 15 MG tablet; Take 1 tablet (15 mg) by mouth daily  Dispense: 30 tablet; Refill: 3    4. Essential hypertension with goal blood pressure less than 140/90  Renew his medication.  Will notify of lab results.  - lisinopril-hydrochlorothiazide (ZESTORETIC) 20-25 MG tablet; Take 1 tablet by mouth daily  Dispense: 90 tablet; Refill: 3  - Comprehensive metabolic panel  - Lipid Profile    5. Screening for prostate cancer  We will notify him of results.  - PSA, screen    6. Need for vaccination    - TD PRSERV FREE >=7 YRS ADS IM [92604]  - 1st  Administration  [82317]  - Each additional admin.  (Right click and add QUANTITY)  [03521]    7. Need for prophylactic vaccination and inoculation against influenza    - INFLUENZA QUAD, RECOMBINANT, P-FREE (RIV4) (FLUBLOCK) [32156]    COUNSELING:   Reviewed preventive health counseling, as reflected in patient instructions       Regular exercise       Healthy diet/nutrition       Vision screening    Estimated body mass index is 52.85 kg/m  as calculated from the following:    Height as of this encounter: 1.854 m (6' 1\").    Weight as of this encounter: 181.7 kg (400 lb 9.6 oz).     Weight management plan: Discussed healthy diet and exercise guidelines     reports that he has been smoking cigars. He has a 0.10 pack-year smoking history. He has never used smokeless tobacco.      Counseling Resources:  ATP IV Guidelines  Pooled Cohorts Equation Calculator  FRAX Risk Assessment  ICSI Preventive Guidelines  Dietary Guidelines for Americans, 2010  USDA's MyPlate  ASA Prophylaxis  Lung CA Screening    Avery Zapata MD  [facility]  "

## 2020-02-17 ENCOUNTER — OFFICE VISIT (OUTPATIENT)
Dept: FAMILY MEDICINE | Facility: CLINIC | Age: 55
End: 2020-02-17
Payer: COMMERCIAL

## 2020-02-17 ENCOUNTER — TELEPHONE (OUTPATIENT)
Dept: FAMILY MEDICINE | Facility: CLINIC | Age: 55
End: 2020-02-17

## 2020-02-17 VITALS
DIASTOLIC BLOOD PRESSURE: 84 MMHG | SYSTOLIC BLOOD PRESSURE: 136 MMHG | RESPIRATION RATE: 16 BRPM | TEMPERATURE: 97.9 F | WEIGHT: 315 LBS | OXYGEN SATURATION: 96 % | HEART RATE: 116 BPM | BODY MASS INDEX: 41.75 KG/M2 | HEIGHT: 73 IN

## 2020-02-17 DIAGNOSIS — Z12.5 SCREENING FOR PROSTATE CANCER: ICD-10-CM

## 2020-02-17 DIAGNOSIS — Z23 NEED FOR PROPHYLACTIC VACCINATION AND INOCULATION AGAINST INFLUENZA: ICD-10-CM

## 2020-02-17 DIAGNOSIS — I10 ESSENTIAL HYPERTENSION WITH GOAL BLOOD PRESSURE LESS THAN 140/90: ICD-10-CM

## 2020-02-17 DIAGNOSIS — Z00.00 ROUTINE GENERAL MEDICAL EXAMINATION AT A HEALTH CARE FACILITY: Primary | ICD-10-CM

## 2020-02-17 DIAGNOSIS — Z23 NEED FOR VACCINATION: ICD-10-CM

## 2020-02-17 DIAGNOSIS — R07.9 CHEST PAIN, UNSPECIFIED TYPE: ICD-10-CM

## 2020-02-17 DIAGNOSIS — M17.12 PRIMARY OSTEOARTHRITIS OF LEFT KNEE: ICD-10-CM

## 2020-02-17 LAB
ALBUMIN SERPL-MCNC: 3.9 G/DL (ref 3.4–5)
ALP SERPL-CCNC: 97 U/L (ref 40–150)
ALT SERPL W P-5'-P-CCNC: 51 U/L (ref 0–70)
ANION GAP SERPL CALCULATED.3IONS-SCNC: 5 MMOL/L (ref 3–14)
AST SERPL W P-5'-P-CCNC: 30 U/L (ref 0–45)
BILIRUB SERPL-MCNC: 0.8 MG/DL (ref 0.2–1.3)
BUN SERPL-MCNC: 13 MG/DL (ref 7–30)
CALCIUM SERPL-MCNC: 8.9 MG/DL (ref 8.5–10.1)
CHLORIDE SERPL-SCNC: 107 MMOL/L (ref 94–109)
CHOLEST SERPL-MCNC: 149 MG/DL
CO2 SERPL-SCNC: 28 MMOL/L (ref 20–32)
CREAT SERPL-MCNC: 0.7 MG/DL (ref 0.66–1.25)
GFR SERPL CREATININE-BSD FRML MDRD: >90 ML/MIN/{1.73_M2}
GLUCOSE SERPL-MCNC: 100 MG/DL (ref 70–99)
HDLC SERPL-MCNC: 42 MG/DL
LDLC SERPL CALC-MCNC: 83 MG/DL
NONHDLC SERPL-MCNC: 107 MG/DL
POTASSIUM SERPL-SCNC: 3.9 MMOL/L (ref 3.4–5.3)
PROT SERPL-MCNC: 7.4 G/DL (ref 6.8–8.8)
PSA SERPL-ACNC: 1.87 UG/L (ref 0–4)
SODIUM SERPL-SCNC: 140 MMOL/L (ref 133–144)
TRIGL SERPL-MCNC: 120 MG/DL

## 2020-02-17 PROCEDURE — 99396 PREV VISIT EST AGE 40-64: CPT | Mod: 25 | Performed by: FAMILY MEDICINE

## 2020-02-17 PROCEDURE — G0103 PSA SCREENING: HCPCS | Performed by: FAMILY MEDICINE

## 2020-02-17 PROCEDURE — 90471 IMMUNIZATION ADMIN: CPT | Performed by: FAMILY MEDICINE

## 2020-02-17 PROCEDURE — 80053 COMPREHEN METABOLIC PANEL: CPT | Performed by: FAMILY MEDICINE

## 2020-02-17 PROCEDURE — 80061 LIPID PANEL: CPT | Performed by: FAMILY MEDICINE

## 2020-02-17 PROCEDURE — 90472 IMMUNIZATION ADMIN EACH ADD: CPT | Performed by: FAMILY MEDICINE

## 2020-02-17 PROCEDURE — 36415 COLL VENOUS BLD VENIPUNCTURE: CPT | Performed by: FAMILY MEDICINE

## 2020-02-17 PROCEDURE — 99213 OFFICE O/P EST LOW 20 MIN: CPT | Mod: 25 | Performed by: FAMILY MEDICINE

## 2020-02-17 PROCEDURE — 90682 RIV4 VACC RECOMBINANT DNA IM: CPT | Performed by: FAMILY MEDICINE

## 2020-02-17 PROCEDURE — 90714 TD VACC NO PRESV 7 YRS+ IM: CPT | Performed by: FAMILY MEDICINE

## 2020-02-17 RX ORDER — MELOXICAM 15 MG/1
15 TABLET ORAL DAILY
Qty: 30 TABLET | Refills: 3 | Status: SHIPPED | OUTPATIENT
Start: 2020-02-17 | End: 2021-04-02

## 2020-02-17 RX ORDER — LISINOPRIL AND HYDROCHLOROTHIAZIDE 20; 25 MG/1; MG/1
1 TABLET ORAL DAILY
Qty: 90 TABLET | Refills: 3 | Status: SHIPPED | OUTPATIENT
Start: 2020-02-17 | End: 2021-04-02

## 2020-02-17 ASSESSMENT — ENCOUNTER SYMPTOMS
CHILLS: 0
PALPITATIONS: 1
HEMATURIA: 0
FREQUENCY: 0
HEARTBURN: 0
SHORTNESS OF BREATH: 1
JOINT SWELLING: 1
PARESTHESIAS: 0
DIARRHEA: 0
ARTHRALGIAS: 1
FEVER: 0
NERVOUS/ANXIOUS: 0
DIZZINESS: 0
SORE THROAT: 0
MYALGIAS: 0
ABDOMINAL PAIN: 0
WEAKNESS: 0
CONSTIPATION: 0
HEADACHES: 0
HEMATOCHEZIA: 0
NAUSEA: 0
EYE PAIN: 0
COUGH: 0
DYSURIA: 0

## 2020-02-17 ASSESSMENT — MIFFLIN-ST. JEOR: SCORE: 2700.99

## 2020-02-17 NOTE — TELEPHONE ENCOUNTER
----- Message from Avery Zapata MD sent at 2/17/2020  1:05 PM CST -----  PSA is 1.87 your chemistry panel including blood sugar liver and kidney tests is entirely normal and your cholesterol is excellent at 149

## 2020-02-17 NOTE — TELEPHONE ENCOUNTER
Tried to reach patient, left message for patient to call the clinic back.    Jessika Dean, [facility]

## 2020-02-17 NOTE — RESULT ENCOUNTER NOTE
PSA is 1.87 your chemistry panel including blood sugar liver and kidney tests is entirely normal and your cholesterol is excellent at 149

## 2020-02-18 ENCOUNTER — HOSPITAL ENCOUNTER (OUTPATIENT)
Dept: NUCLEAR MEDICINE | Facility: CLINIC | Age: 55
Setting detail: NUCLEAR MEDICINE
Discharge: HOME | End: 2020-02-20
Attending: FAMILY MEDICINE | Admitting: FAMILY MEDICINE
Payer: COMMERCIAL

## 2020-02-18 DIAGNOSIS — R07.9 CHEST PAIN, UNSPECIFIED TYPE: ICD-10-CM

## 2020-02-18 PROCEDURE — 34300033 ZZH RX 343: Performed by: FAMILY MEDICINE

## 2020-02-18 PROCEDURE — 78452 HT MUSCLE IMAGE SPECT MULT: CPT | Mod: 26 | Performed by: INTERNAL MEDICINE

## 2020-02-18 PROCEDURE — A9502 TC99M TETROFOSMIN: HCPCS | Performed by: FAMILY MEDICINE

## 2020-02-18 PROCEDURE — 93016 CV STRESS TEST SUPVJ ONLY: CPT | Performed by: INTERNAL MEDICINE

## 2020-02-18 PROCEDURE — 25000128 H RX IP 250 OP 636: Performed by: FAMILY MEDICINE

## 2020-02-18 PROCEDURE — 78452 HT MUSCLE IMAGE SPECT MULT: CPT

## 2020-02-18 PROCEDURE — 93017 CV STRESS TEST TRACING ONLY: CPT | Performed by: INTERNAL MEDICINE

## 2020-02-18 PROCEDURE — 93018 CV STRESS TEST I&R ONLY: CPT | Performed by: INTERNAL MEDICINE

## 2020-02-18 RX ORDER — REGADENOSON 0.08 MG/ML
0.4 INJECTION, SOLUTION INTRAVENOUS ONCE
Status: COMPLETED | OUTPATIENT
Start: 2020-02-18 | End: 2020-02-18

## 2020-02-18 RX ADMIN — REGADENOSON 0.4 MG: 0.08 INJECTION, SOLUTION INTRAVENOUS at 09:25

## 2020-02-18 RX ADMIN — Medication 36.2 MCI.: at 09:30

## 2020-02-20 ENCOUNTER — HOSPITAL ENCOUNTER (OUTPATIENT)
Dept: NUCLEAR MEDICINE | Facility: CLINIC | Age: 55
Setting detail: NUCLEAR MEDICINE
End: 2020-02-20
Attending: FAMILY MEDICINE
Payer: COMMERCIAL

## 2020-02-20 LAB
CV STRESS MAX HR HE: 109
RATE PRESSURE PRODUCT: NORMAL
STRESS ECHO BASELINE DIASTOLIC HE: 82
STRESS ECHO BASELINE HR: 88
STRESS ECHO BASELINE SYSTOLIC BP: 134
STRESS ECHO CALCULATED PERCENT HR: 66 %
STRESS ECHO LAST STRESS DIASTOLIC BP: 86
STRESS ECHO LAST STRESS SYSTOLIC BP: 146
STRESS ECHO TARGET HR: 164

## 2020-02-20 PROCEDURE — 34300033 ZZH RX 343: Performed by: FAMILY MEDICINE

## 2020-02-20 PROCEDURE — A9502 TC99M TETROFOSMIN: HCPCS | Performed by: FAMILY MEDICINE

## 2020-02-20 RX ADMIN — Medication 36.4 MILLICURIE: at 08:55

## 2020-03-02 NOTE — RESULT ENCOUNTER NOTE
The nuclear stress test is most likely negative they said but the sensitivity of the test is reduced because of the image quality is less because of your body size.  If further issues we need to do other testing.

## 2020-12-23 ENCOUNTER — TELEPHONE (OUTPATIENT)
Dept: FAMILY MEDICINE | Facility: CLINIC | Age: 55
End: 2020-12-23

## 2020-12-23 NOTE — TELEPHONE ENCOUNTER
Incoming fax received from [facility]. They monitor the patients prescription use and they have found that he may have stopped taking his lisinopril/HCTZ 20-25mg. Please advise or call patient.  Mercy Amin CMA

## 2020-12-23 NOTE — TELEPHONE ENCOUNTER
Patient states that he is still taking the lisinopril. He ran out of it on Saturday and just picked up his new prescription the other day at [facility].    Jessika Dean, CMA

## 2021-01-09 ENCOUNTER — HEALTH MAINTENANCE LETTER (OUTPATIENT)
Age: 56
End: 2021-01-09

## 2021-03-30 ASSESSMENT — ENCOUNTER SYMPTOMS
HEMATURIA: 0
FREQUENCY: 0
ABDOMINAL PAIN: 0
NERVOUS/ANXIOUS: 0
COUGH: 0
JOINT SWELLING: 0
DIZZINESS: 0
ARTHRALGIAS: 1
FEVER: 0
MYALGIAS: 0
CONSTIPATION: 0
HEARTBURN: 0
HEMATOCHEZIA: 0
PARESTHESIAS: 0
CHILLS: 0
PALPITATIONS: 0
WEAKNESS: 0
DIARRHEA: 0
SHORTNESS OF BREATH: 1
NAUSEA: 0
HEADACHES: 0
DYSURIA: 0
EYE PAIN: 0
SORE THROAT: 0

## 2021-04-02 ENCOUNTER — OFFICE VISIT (OUTPATIENT)
Dept: FAMILY MEDICINE | Facility: CLINIC | Age: 56
End: 2021-04-02
Payer: COMMERCIAL

## 2021-04-02 VITALS
WEIGHT: 315 LBS | HEART RATE: 122 BPM | BODY MASS INDEX: 41.75 KG/M2 | OXYGEN SATURATION: 98 % | TEMPERATURE: 96.8 F | DIASTOLIC BLOOD PRESSURE: 74 MMHG | RESPIRATION RATE: 16 BRPM | HEIGHT: 73 IN | SYSTOLIC BLOOD PRESSURE: 136 MMHG

## 2021-04-02 DIAGNOSIS — Z00.00 ROUTINE GENERAL MEDICAL EXAMINATION AT A HEALTH CARE FACILITY: Primary | ICD-10-CM

## 2021-04-02 DIAGNOSIS — M17.12 PRIMARY OSTEOARTHRITIS OF LEFT KNEE: ICD-10-CM

## 2021-04-02 DIAGNOSIS — D56.3 THALASSEMIA CARRIER: ICD-10-CM

## 2021-04-02 DIAGNOSIS — Z12.5 SCREENING FOR PROSTATE CANCER: ICD-10-CM

## 2021-04-02 DIAGNOSIS — Z13.6 CARDIOVASCULAR SCREENING; LDL GOAL LESS THAN 130: ICD-10-CM

## 2021-04-02 DIAGNOSIS — I10 ESSENTIAL HYPERTENSION WITH GOAL BLOOD PRESSURE LESS THAN 140/90: ICD-10-CM

## 2021-04-02 DIAGNOSIS — I10 HYPERTENSION GOAL BP (BLOOD PRESSURE) < 140/90: ICD-10-CM

## 2021-04-02 PROCEDURE — 99396 PREV VISIT EST AGE 40-64: CPT | Performed by: FAMILY MEDICINE

## 2021-04-02 RX ORDER — MELOXICAM 15 MG/1
15 TABLET ORAL DAILY
Qty: 90 TABLET | Refills: 3 | Status: SHIPPED | OUTPATIENT
Start: 2021-04-02 | End: 2022-04-21

## 2021-04-02 RX ORDER — LISINOPRIL AND HYDROCHLOROTHIAZIDE 20; 25 MG/1; MG/1
1 TABLET ORAL DAILY
Qty: 90 TABLET | Refills: 3 | Status: SHIPPED | OUTPATIENT
Start: 2021-04-02 | End: 2022-04-21

## 2021-04-02 ASSESSMENT — ENCOUNTER SYMPTOMS
DYSURIA: 0
JOINT SWELLING: 0
CHILLS: 0
NERVOUS/ANXIOUS: 0
NAUSEA: 0
DIZZINESS: 0
PARESTHESIAS: 0
SHORTNESS OF BREATH: 1
HEADACHES: 0
FREQUENCY: 0
HEARTBURN: 0
SORE THROAT: 0
HEMATOCHEZIA: 0
PALPITATIONS: 0
ABDOMINAL PAIN: 0
COUGH: 0
WEAKNESS: 0
HEMATURIA: 0
MYALGIAS: 0
EYE PAIN: 0
DIARRHEA: 0
CONSTIPATION: 0
ARTHRALGIAS: 1
FEVER: 0

## 2021-04-02 ASSESSMENT — PATIENT HEALTH QUESTIONNAIRE - PHQ9: SUM OF ALL RESPONSES TO PHQ QUESTIONS 1-9: 1

## 2021-04-02 ASSESSMENT — PAIN SCALES - GENERAL: PAINLEVEL: MILD PAIN (2)

## 2021-04-02 ASSESSMENT — MIFFLIN-ST. JEOR: SCORE: 2716.86

## 2021-04-02 NOTE — PROGRESS NOTES
SUBJECTIVE:   CC: Sharif Giraldo is an 55 year old male who presents for preventative health visit.     Patient has been advised of split billing requirements and indicates understanding: Yes  Healthy Habits:     Getting at least 3 servings of Calcium per day:  Yes    Bi-annual eye exam:  Yes    Dental care twice a year:  Yes    Sleep apnea or symptoms of sleep apnea:  Sleep apnea    Diet:  Regular (no restrictions)    Frequency of exercise:  None    Taking medications regularly:  Yes    Medication side effects:  Not applicable    PHQ-2 Total Score: 1    Additional concerns today:  No      Today's PHQ-2 Score:   PHQ-2 ( 1999 Pfizer) 3/30/2021   Q1: Little interest or pleasure in doing things 1   Q2: Feeling down, depressed or hopeless 1   PHQ-2 Score 2   Q1: Little interest or pleasure in doing things Several days   Q2: Feeling down, depressed or hopeless Several days   PHQ-2 Score 2       Abuse: Current or Past(Physical, Sexual or Emotional)- No  Do you feel safe in your environment? Yes        Social History     Tobacco Use     Smoking status: Current Every Day Smoker     Packs/day: 0.01     Years: 10.00     Pack years: 0.10     Types: Cigars     Smokeless tobacco: Never Used     Tobacco comment:  cigars 2/ day   Substance Use Topics     Alcohol use: Yes     Alcohol/week: 0.0 standard drinks     Comment: 1 a week      If you drink alcohol do you typically have >3 drinks per day or >7 drinks per week? No    Alcohol Use 3/30/2021   Prescreen: >3 drinks/day or >7 drinks/week? No   Prescreen: >3 drinks/day or >7 drinks/week? -   No flowsheet data found.    Last PSA:   PSA   Date Value Ref Range Status   02/17/2020 1.87 0 - 4 ug/L Final     Comment:     Assay Method:  Chemiluminescence using Siemens Vista analyzer       Reviewed orders with patient. Reviewed health maintenance and updated orders accordingly - Yes      Reviewed and updated as needed this visit by clinical staff  Tobacco  Allergies  Meds      Soc  Hx        Reviewed and updated as needed this visit by Provider                Past Medical History:   Diagnosis Date     HTN (hypertension) 2012     Morbid obesity with BMI of 60.0-69.9, adult (H)      LUH (obstructive sleep apnea) 5/2013    Severe       Past Surgical History:   Procedure Laterality Date     C AFF EYE MUSCLE SURG PROC UNLISTED  1970,1974     C ANESTH,REPAIR LO ABD HERNIA NOS  2000,2001    umbilical area     COLONOSCOPY  9/20/2013    Procedure: COLONOSCOPY;  Colonoscopy;  Surgeon: Avery Morales MD;  Location:  GI     HC EXCISE EXCESS SKIN TISSUE,OTHER      Hx; staph complications of hernia surg     HC KNEE SCOPE, DIAGNOSTIC  1999,2000    Arthroscopy, Knee     HC REMOVAL OF TONSILS,<11 Y/O  1973    Tonsils <10y.o.     HC REPAIR INITIAL INCISIONAL HERNIA; INCARCERATED OR STRANGULATED  09/25/2002    Mesh repair of incarcerated incisional hernia, recurrent.     HERNIA REPAIR, INCISIONAL  10/09/08    Ventral herniorrhaphy/mesh placement.  Excision left elbow mass.     INJECT EPIDURAL CERVICAL N/A 4/12/2018    Procedure: INJECT EPIDURAL CERVICAL;  translaminar epidural injection at cervical 7 - throacic 1;  Surgeon: Phong Andrade MD;  Location:  OR       Review of Systems   Constitutional: Negative for chills and fever.   HENT: Negative for congestion, ear pain, hearing loss and sore throat.    Eyes: Negative for pain and visual disturbance.   Respiratory: Positive for shortness of breath. Negative for cough.    Cardiovascular: Positive for peripheral edema. Negative for chest pain and palpitations.   Gastrointestinal: Negative for abdominal pain, constipation, diarrhea, heartburn, hematochezia and nausea.   Genitourinary: Positive for impotence. Negative for discharge, dysuria, frequency, genital sores, hematuria and urgency.   Musculoskeletal: Positive for arthralgias. Negative for joint swelling and myalgias.   Skin: Negative for rash.   Neurological: Negative for dizziness, weakness,  "headaches and paresthesias.   Psychiatric/Behavioral: Positive for mood changes. The patient is not nervous/anxious.          OBJECTIVE:   /74   Pulse 122   Temp 96.8  F (36  C) (Temporal)   Resp 16   Ht 1.854 m (6' 1\")   Wt (!) 183.8 kg (405 lb 3.2 oz)   SpO2 98%   BMI 53.46 kg/m      Physical Exam  GENERAL: healthy, alert and no distress  EYES: Eyes grossly normal to inspection, PERRL and conjunctivae and sclerae normal  HENT: ear canals and TM's normal, nose and mouth without ulcers or lesions  NECK: no adenopathy, no asymmetry, masses, or scars and thyroid normal to palpation  RESP: lungs clear to auscultation - no rales, rhonchi or wheezes  CV: regular rate and rhythm, normal S1 S2, no S3 or S4, no murmur, click or rub, no peripheral edema and peripheral pulses strong  ABDOMEN: soft, nontender, no hepatosplenomegaly, no masses and bowel sounds normal  MS: no gross musculoskeletal defects noted, no edema  SKIN: no suspicious lesions or rashes  NEURO: Normal strength and tone, sensory exam grossly normal, mentation intact and gait abnormal:   PSYCH: mentation appears normal, affect normal/bright    Diagnostic Test Results:  none     ASSESSMENT/PLAN:   1. Routine general medical examination at a health care facility  Very overweight and he knows this.    2. Essential hypertension with goal blood pressure less than 140/90  Blood pressures okay we will continue on the same medication  - lisinopril-hydrochlorothiazide (ZESTORETIC) 20-25 MG tablet; Take 1 tablet by mouth daily  Dispense: 90 tablet; Refill: 3    3. Primary osteoarthritis of left knee  Helps with his knee pain.  - meloxicam (MOBIC) 15 MG tablet; Take 1 tablet (15 mg) by mouth daily  Dispense: 90 tablet; Refill: 3    4. Thalassemia carrier  We have been following this.  - **CBC with platelets FUTURE anytime; Future    5. Hypertension goal BP (blood pressure) < 140/90  We will notify with results she will come in fasting in the future.  - " "**Comprehensive metabolic panel FUTURE anytime; Future    6. CARDIOVASCULAR SCREENING; LDL GOAL LESS THAN 130  We will notify after he comes in fasting.  - Lipid panel reflex to direct LDL Fasting; Future    7. Screening for prostate cancer  We will notify with results  - **Prostate spec antigen screen FUTURE anytime; Future    Patient has been advised of split billing requirements and indicates understanding: Yes  COUNSELING:   Reviewed preventive health counseling, as reflected in patient instructions       Regular exercise       Healthy diet/nutrition       Vision screening    Estimated body mass index is 53.46 kg/m  as calculated from the following:    Height as of this encounter: 1.854 m (6' 1\").    Weight as of this encounter: 183.8 kg (405 lb 3.2 oz).     Weight management plan: Discussed healthy diet and exercise guidelines    He reports that he has been smoking cigars. He has a 0.10 pack-year smoking history. He has never used smokeless tobacco.  Tobacco Cessation Action Plan:   Self help information given to patient      Counseling Resources:  ATP IV Guidelines  Pooled Cohorts Equation Calculator  FRAX Risk Assessment  ICSI Preventive Guidelines  Dietary Guidelines for Americans, 2010  USDA's MyPlate  ASA Prophylaxis  Lung CA Screening    Avery Zapata MD  [facility]  "

## 2021-04-06 DIAGNOSIS — D56.3 THALASSEMIA CARRIER: ICD-10-CM

## 2021-04-06 DIAGNOSIS — Z13.6 CARDIOVASCULAR SCREENING; LDL GOAL LESS THAN 130: ICD-10-CM

## 2021-04-06 DIAGNOSIS — I10 HYPERTENSION GOAL BP (BLOOD PRESSURE) < 140/90: ICD-10-CM

## 2021-04-06 DIAGNOSIS — Z12.5 SCREENING FOR PROSTATE CANCER: ICD-10-CM

## 2021-04-06 LAB
ALBUMIN SERPL-MCNC: 3.7 G/DL (ref 3.4–5)
ALP SERPL-CCNC: 93 U/L (ref 40–150)
ALT SERPL W P-5'-P-CCNC: 54 U/L (ref 0–70)
ANION GAP SERPL CALCULATED.3IONS-SCNC: 3 MMOL/L (ref 3–14)
AST SERPL W P-5'-P-CCNC: 22 U/L (ref 0–45)
BILIRUB SERPL-MCNC: 0.8 MG/DL (ref 0.2–1.3)
BUN SERPL-MCNC: 13 MG/DL (ref 7–30)
CALCIUM SERPL-MCNC: 8.9 MG/DL (ref 8.5–10.1)
CHLORIDE SERPL-SCNC: 105 MMOL/L (ref 94–109)
CHOLEST SERPL-MCNC: 136 MG/DL
CO2 SERPL-SCNC: 28 MMOL/L (ref 20–32)
CREAT SERPL-MCNC: 0.72 MG/DL (ref 0.66–1.25)
ERYTHROCYTE [DISTWIDTH] IN BLOOD BY AUTOMATED COUNT: 17.4 % (ref 10–15)
GFR SERPL CREATININE-BSD FRML MDRD: >90 ML/MIN/{1.73_M2}
GLUCOSE SERPL-MCNC: 119 MG/DL (ref 70–99)
HCT VFR BLD AUTO: 38.9 % (ref 40–53)
HDLC SERPL-MCNC: 38 MG/DL
HGB BLD-MCNC: 12.4 G/DL (ref 13.3–17.7)
LDLC SERPL CALC-MCNC: 76 MG/DL
MCH RBC QN AUTO: 21 PG (ref 26.5–33)
MCHC RBC AUTO-ENTMCNC: 31.9 G/DL (ref 31.5–36.5)
MCV RBC AUTO: 66 FL (ref 78–100)
NONHDLC SERPL-MCNC: 98 MG/DL
PLATELET # BLD AUTO: 179 10E9/L (ref 150–450)
POTASSIUM SERPL-SCNC: 3.9 MMOL/L (ref 3.4–5.3)
PROT SERPL-MCNC: 6.9 G/DL (ref 6.8–8.8)
PSA SERPL-ACNC: 2.03 UG/L (ref 0–4)
RBC # BLD AUTO: 5.91 10E12/L (ref 4.4–5.9)
SODIUM SERPL-SCNC: 136 MMOL/L (ref 133–144)
TRIGL SERPL-MCNC: 109 MG/DL
WBC # BLD AUTO: 5.7 10E9/L (ref 4–11)

## 2021-04-06 PROCEDURE — 80061 LIPID PANEL: CPT | Performed by: FAMILY MEDICINE

## 2021-04-06 PROCEDURE — G0103 PSA SCREENING: HCPCS | Performed by: FAMILY MEDICINE

## 2021-04-06 PROCEDURE — 36415 COLL VENOUS BLD VENIPUNCTURE: CPT | Performed by: FAMILY MEDICINE

## 2021-04-06 PROCEDURE — 80053 COMPREHEN METABOLIC PANEL: CPT | Performed by: FAMILY MEDICINE

## 2021-04-06 PROCEDURE — 85027 COMPLETE CBC AUTOMATED: CPT | Performed by: FAMILY MEDICINE

## 2021-04-14 ENCOUNTER — TELEPHONE (OUTPATIENT)
Dept: FAMILY MEDICINE | Facility: CLINIC | Age: 56
End: 2021-04-14

## 2021-04-14 NOTE — RESULT ENCOUNTER NOTE
Your blood work from last week showed your hemoglobin looks fine PSA is fine chemistry panel showed borderline elevated blood sugar so watch the carbohydrates in your blood count is kind of stable where it is always been.

## 2021-04-14 NOTE — TELEPHONE ENCOUNTER
Your blood work from last week showed your hemoglobin looks fine. PSA is fine. Chemistry panel showed borderline elevated blood sugar so watch the carbohydrates in your blood count is kind of stable where it is always been.    Patient stated understanding and had no questions.    Jessika Dean, CMA

## 2021-04-14 NOTE — TELEPHONE ENCOUNTER
----- Message from Avery Zapata MD sent at 4/14/2021  9:25 AM CDT -----  Your blood work from last week showed your hemoglobin looks fine PSA is fine chemistry panel showed borderline elevated blood sugar so watch the carbohydrates in your blood count is kind of stable where it is always been.

## 2021-04-26 NOTE — PROGRESS NOTES
Does Sharif have a CPAP/Bipap?  Yes     (If yes please fill out below.  If no please delete links)    Type of mask: nasal    [facility] [place]: [place] [phone]    [email or web address]     [place] Sleep Scale: 0    Obstructive Sleep Apnea - PAP Follow-Up Visit:    Chief Complaint   Patient presents with     CPAP Follow Up       Sharif Giraldo comes in today for follow-up of their severe sleep apnea, managed with BIPAP.     No specialty comments available.    Overall, he rates the experience with PAP as 9 (0 poor, 10 great). The mask is comfortable.   The mask is not leaking .  He is not snoring with the mask on. He is not having gasp arousals.  He is not having significant oral/nasal dryness. The pressure is comfortable.     His PAP interface is Nasal Mask.    Bedtime is typically 10. Usually it takes about 30 minutes to fall asleep with the mask on. Wake time is typically 5:30.  Patient is using PAP therapy 6.5 hours per night. The patient is usually getting 7 hours of sleep per night.    He does feel rested in the morning.    [place] Sleepiness Scale: 0/24      No data recorded      Respironics    Auto-BIPAP 25 - 10 cmH2O 30 day usage data:    100% of days with > 4 hours of use. 0/30 days with no use.   Average use 7 hours 7 minutes per day.   Average leak - LPM.  Average % of night in large leak -%.    IPAP 90% pressure 15.1cm.   EPAP 90% pressure 12.3cm.   AHI 1 events per hour.          Past medical/surgical history, family history, social history, medications and allergies were reviewed.      Problem List:  Patient Active Problem List    Diagnosis Date Noted     Cervical radiculopathy 05/02/2018     Priority: Medium     Cervical pain 03/12/2018     Priority: Medium     Osteoarthritis of spine with radiculopathy, cervical region 03/07/2018     Priority: Medium     Carpal tunnel syndrome of left wrist 03/07/2018     Priority: Medium     Major depressive disorder,  recurrent episode, mild (H) 09/23/2016     Priority: Medium     Thalassemia carrier 06/11/2013     Priority: Medium     Hypogonadism male 05/30/2013     Priority: Medium     LUH (obstructive sleep apnea)-very severe () 05/14/2013     Priority: Medium     Indications for Polysomnography 5/8/2013: The patient is a 47 y year old Male who is 6' and weighs 448.1 lbs.  His BMI equals 61.3.  The patients [place] sleepiness scale was 21.0 and neck size was 21.5.  A diagnostic polysomnogram was performed to evaluate for excessive sleepiness, snoring, and possible LUH. After 121.0 minutes of sleep time the patient exhibited sufficient respiratory events qualifying him for a CPAP trial which was then initiated.      Polysomnogram Data:  A full night polysomnogram was performed recording the standard physiologic parameters including EEG, EOG, EMG, EKG, nasal and oral airflow.  Respiratory parameters of chest and abdominal movements are recorded with respiratory inductance plethysmography.  Oxygen saturation was recorded by pulse oximetry.      Diagnostic PSG  Sleep Architecture:  The total recording time of the diagnostic portion of the study was 134.7 minutes.  The total sleep time was 121.0 minutes.  During the diagnostic portion of the study the sleep latency was 0.2 minutes.  REM latency was 116.0 minutes.  Sleep Efficiency was 89.8%.  Wake after sleep onset was 13.0.   The patient spent 21.5% of total sleep time in Stage N1, 69.8% in Stage N2, 2.9% in Stages N3 and 5.8% in REM.         Respiration:     Sustained Sleep Associated Hypoventilation - was present with a baseline TCM of 45 and a maximum TCM of 54. PaCO2 was 44.    Sleep Associated Hypoxemia - was present and was very severe.  Baseline oxygen saturation was 86.8%.  The lowest oxygen saturation was 32.0%.  Snoring was reported as moderately loud to loud.     Events - During the diagnostic portion of the study, the polysomnogram revealed a presence of 167  obstructive, 1 central, and 1 mixed apneas resulting in an Apnea index of 83.8 events per hour.  There were 38 hypopneas resulting in a Hypopnea index of 18.8 events per hour.  The combined Apnea/Hypopnea Index was 102.6 events per hour.  The REM AHI was 60.0.  The RERA index was 0 per hour.   The RDI was 103.    - 102.6     Treatment PSG  Sleep Architecture:  At 12:24 am the patient was placed on CPAP and then bi-level treatment was titrated.  The total recording time of the treatment portion of the study was 366.2 minutes.  The total sleep time was 350.5 minutes.  During the treatment portion of the study the sleep latency was 0 minutes.  REM latency was 66.0 minutes.  Sleep Efficiency was 95.7%.  Sleep Maintenance Efficiency was 96.0%.  Total wake time was 16.0 minutes for a total wake percentage of 3.8%. the patient spent 6.3% of total sleep time in Stage N1, 20.1% in Stage N2, 40.7% in Stages N3 and N, and 33.0% in REM.       Respiration:  CPAP was titrated from 5 cmH2O to 1 5cmH20 but oxygen desaturations persisted into the 70%s so bi-level PAP was initiated.  Bi-level was titrated to 20/25pyG46 in response to hypopneas and low O2 saturations.  The final pressure was 20.0/14.0 with an AHI of 11.0 all of which were central apneas. Supine REM noted at 18/13-12 cmH2O. AHI was 11 but all were centrals. TCM returned to improved levels (38) with Bi-level.     Movement Activity:      Limb - During the diagnostic portion of the study, there were no limb movements recorded. During the treatment portion of the study, there were 376 limb movements recorded.  Of this total, 367 were classified as PLMs.  Of the PLMs, none were associated with arousals.  The Limb Movement index was 64.4 per hour while the PLM index was 62.8 per hour.     Behavior - none    Bruxism - none    Seizure - none      CARDIAC SUMMARY:   During the diagnostic portion of the study, the average pulse rate was 84.7 bpm.  The minimum pulse rate was 35.0  bpm while the maximum pulse rate was 124.3 bpm.    During the treatment portion of the study, the average pulse rate was 80.8 bpm.  The minimum pulse rate was 53.8 bpm while the maximum pulse rate was 125.1 bpm.       Assessment:    Very severe LUH () with adequate positive airway pressure titration.    Sleep associated hypoventilation secondary to obesity (OHS)  Recommendations:    Bi-level PAP pressure 18/13 cmH2O with clinical follow-up within 3 - 4 weeks including compliance measures. If pressure too high, or if AHI unacceptably high, proceed to full night CPAP/Bi-level PAP titration study.     Advise regarding the risks of drowsy driving    Suggest optimizing sleep hygiene and avoiding sleep deprivation    Weight management    Dopaminergic therapy should be used for management of restless leg syndrome (RLS) and not based on the presence of periodic limb movements alone.       Sleep related hypoventilation/hypoxemia in other disease 05/14/2013     Priority: Medium     Problem list name updated by automated process. Provider to review       Nocturia 04/18/2013     Priority: Medium     Anemia 04/18/2013     Priority: Medium     Hypertension goal BP (blood pressure) < 140/90 10/18/2012     Priority: Medium     CARDIOVASCULAR SCREENING; LDL GOAL LESS THAN 130 10/31/2010     Priority: Medium     Morbid obesity (H) 05/07/2008     Priority: Medium     Umbilical hernia 08/13/2002     Priority: Medium     Problem list name updated by automated process. Provider to review          There were no vitals taken for this visit.    Impression/Plan:     Severe Sleep apnea. He is Tolerating BIPAP well. Daytime symptoms are stable. Apneas well controlled. We discussed replacement of machine. New supplies ordered.       Sharif Giraldo will follow up in about 1 year(s).     Fifteen minutes spent with patient, all of which were spent face-to-face counseling, consulting, coordinating plan of care.      CC:  Avery Zapata  ADELINA

## 2021-04-27 ENCOUNTER — OFFICE VISIT (OUTPATIENT)
Dept: SLEEP MEDICINE | Facility: CLINIC | Age: 56
End: 2021-04-27
Payer: COMMERCIAL

## 2021-04-27 VITALS
BODY MASS INDEX: 41.75 KG/M2 | OXYGEN SATURATION: 98 % | WEIGHT: 315 LBS | HEIGHT: 73 IN | DIASTOLIC BLOOD PRESSURE: 66 MMHG | HEART RATE: 83 BPM | SYSTOLIC BLOOD PRESSURE: 144 MMHG

## 2021-04-27 DIAGNOSIS — G47.33 OSA (OBSTRUCTIVE SLEEP APNEA): Primary | ICD-10-CM

## 2021-04-27 PROCEDURE — 99202 OFFICE O/P NEW SF 15 MIN: CPT | Performed by: PHYSICIAN ASSISTANT

## 2021-04-27 ASSESSMENT — MIFFLIN-ST. JEOR: SCORE: 2745.43

## 2021-05-25 ENCOUNTER — RECORDS - HEALTHEAST (OUTPATIENT)
Dept: ADMINISTRATIVE | Facility: CLINIC | Age: 56
End: 2021-05-25

## 2021-10-01 ENCOUNTER — VIRTUAL VISIT (OUTPATIENT)
Dept: FAMILY MEDICINE | Facility: CLINIC | Age: 56
End: 2021-10-01
Payer: COMMERCIAL

## 2021-10-01 DIAGNOSIS — R05.9 COUGH: Primary | ICD-10-CM

## 2021-10-01 PROCEDURE — 99213 OFFICE O/P EST LOW 20 MIN: CPT | Mod: TEL | Performed by: PHYSICIAN ASSISTANT

## 2021-10-01 NOTE — PROGRESS NOTES
PAULO is a 56 year old who is being evaluated via a billable telephone visit.        Assessment & Plan     Cough  - Symptomatic COVID-19 Virus (Coronavirus) by PCR Nose; Future    Covid testing is advised.  Order was placed and will be scheduled later today.  Isolation quarantine guidelines reviewed.    30 minutes spent on the date of the encounter doing chart review, patient visit, documentation and discussion with family     No follow-ups on file.    JOSE Gomez [facility]   PAULO is a 56 year old who presents for the following health issues  accompanied by his spouse:    HPI       Concern for COVID-19  About how many days ago did these symptoms start? 2 days  Is this your first visit for this illness? Yes  In the 14 days before your symptoms started, have you had close contact with someone with COVID-19 (Coronavirus)? Yes, I have been in contact with someone who has COVID-19/Coronavirus (confirmed by lab test).  Do you have a fever or chills? No  Are you having new or worsening difficulty breathing? No  Do you have new or worsening cough? Yes, I am coughing up mucus.  Have you had any new or unexplained body aches? No    Have you experienced any of the following NEW symptoms?    Headache: No    Sore throat: No    Loss of taste or smell: No    Chest pain: No    Diarrhea: No    Rash: No  What treatments have you tried? Sudafed, cough syrup  Who do you live with? Wife, son, daughter  Are you, or a household member, a healthcare worker or a ? No  Do you live in a nursing home, group home, or shelter? No  Do you have a way to get food/medications if quarantined? Yes, I have a friend or family member who can help me.          Paulo presents via telephone for evaluation of a cough.  He was at a family reunion last week and was exposed to somebody who was Covid positive.  2 days ago he developed a productive cough.  He does not have any wheezing or tightness in  his chest.    Review of Systems   ROS negative except as noted above      Objective           Vitals:  No vitals were obtained today due to virtual visit.    Physical Exam   healthy, alert and no distress  PSYCH: Alert and oriented times 3; coherent speech, normal   rate and volume, able to articulate logical thoughts, able   to abstract reason, no tangential thoughts, no hallucinations   or delusions  His affect is normal and pleasant  RESP: No cough, no audible wheezing, able to talk in full sentences  Remainder of exam unable to be completed due to telephone visits    No results found for any visits on 10/01/21.        Phone call duration: 19 minutes

## 2021-10-01 NOTE — PATIENT INSTRUCTIONS
Quarantine is for those who have had a close contact to someone who is COVID positive. A close contact is defined as being within 6 feet for 15 minutes or longer. We recommend you stay home and away from others for 14 days to monitor for symptoms. If you develop symptoms, enter isolation guidelines and be tested for COVID-19. ** If you are living with someone who is COVID positive and sharing the same living space, you should quarantine beginning now but the 14 day timeline begins after that person's isolation ends.    Isolation is for those who have symptoms or test positive for COVID-19. You should remain home and away from others for 10 days after your symptoms start. You may return to activities after 10 days as long as you have been fever free for 24 hours and have had an improvement in your symptoms. If you are tested and your test comes back negative, you may return to activities 24 hours after you have been fever free without medication.    Isolation and Quarantine Guidelines:  Centers for Disease Control (CDC) https://www.cdc.gov/coronavirus/2019-ncov/your-health/quarantine-isolation.html  [place] Department of Health ([facility]) https://www.health.[facility].[place]./diseases/coronavirus/quarguide.pdf    Your symptoms show that you may have coronavirus (COVID-19). This illness can cause fever, cough and trouble  breathing. Many people get a mild case and get better on their own. Some people can get very sick.    What should I do?  1. We would like to test you for this virus. A  will call you to set up the tesing.    2. When it s time for your COVID test:    Stay at least 6 feet away from others. (If someone will drive you to your test, stay in the backseat, as  far away from the  as you can.)    Cover your mouth and nose with a mask, tissue or washcloth.    Go straight to the testing site. Don't make any stops on the way there or back.    3. Starting now: Stay home and away from others (self-isolate)  until:    You've had no fever--and no medicine that reduces fever--for 3 full days (72 hours). And     Your other symptoms have gotten better. For example, your cough or breathing has improved. And     At least 10 days have passed since your symptoms started.    4. During this time, don t leave the house except for testing or medical care.    Stay in your own room, even for meals. Use your own bathroom if you can.    Stay away from others in your home. No hugging, kissing or shaking hands. No visitors.    Don t go to work, school or anywhere else.    Clean  high touch  surfaces often (doorknobs, counters, handles, etc.). Use a household cleaning  spray or wipes. You ll find a full list of  on the EPA website: www.epa.gov/pesticideregistration/  list-n-disinfectants-use-against-sars-cov-2.    Cover your mouth and nose with a mask, tissue or washcloth to avoid spreading germs.    Wash your hands and face often. Use soap and water.    Caregivers in these groups are at risk for severe illness due to COVID-19:  o People 65 years and older  o People who live in a nursing home or long-term care facility  o People with chronic disease (lung, heart, cancer, diabetes, kidney, liver, immunologic)  o People who have a weakened immune system, including those who:    Are in cancer treatment    Take medicine that weakens the immune system, such as corticosteroids    Had a bone marrow or organ transplant    Have an immune deficiency    Have poorly controlled HIV or AIDS    Are obese (body mass index of 40 or higher)    Smoke regularly  o Caregivers should wear gloves while washing dishes, handling laundry and cleaning  bedrooms and bathrooms.  o Use caution when washing and drying laundry: Don t shake dirty laundry, and use the  warmest water setting that you can.  o For more tips, go to www.cdc.gov/coronavirus/2019-ncov/downloads/10Things.pdf.    How can I take care of myself?  1. Get lots of rest. Drink extra fluids  (unless a doctor has told you not to).  2. Take Tylenol (acetaminophen) for fever or pain. If you have liver or kidney problems, ask your family  doctor if it's okay to take Tylenol.  Adults can take either:    650 mg (two 325 mg pills) every 4 to 6 hours, or     1,000 mg (two 500 mg pills) every 8 hours as needed.    Note: Don't take more than 3,000 mg in one day. Acetaminophen is found in many medicines  (both prescribed and over-the-counter medicines). Read all labels to be sure you don t take too  much.  For children, check the Tylenol bottle for the right dose. The dose is based on the child's age or weight.  3. If you have other health problems (like cancer, heart failure, an organ transplant or severe kidney  disease): Call your specialty clinic if you don't feel better in the next 2 days.  4. Know when to call 911. Emergency warning signs include:    Trouble breathing or shortness of breath    Pain or pressure in the chest that doesn t go away    Feeling confused like you haven t felt before, or not being able to wake up    Bluish-colored lips or face    Where can I get more information?    [facility] - About COVID-19: [email or web address]    CDC - What to Do If You re Sick: www.cdc.gov/coronavirus/2019-ncov/about/steps-when-sick.html    CDC - Ending Home Isolation: www.cdc.gov/coronavirus/2019-ncov/hcp/disposition-in-homepatients.  html    CDC - Caring for Someone: www.cdc.gov/coronavirus/2019-ncov/if-you-are-sick/care-for-someone.html    [facility] - Interim Guidance for Hospital Discharge to Home:  www.health.[facility].[place]/diseases/coronavirus/hcp/hospdischarge.pdf    [facility] clinical trials (COVID-19 research studies): clinicalaffairs.[facility]-clinicaltrials    Below are the COVID-19 hotlines at the [place] Department of Health ([facility]). Interpreters are  available.  o For health questions: Call 521-226-5491 or 1-112.751.9522 (7 a.m. to 7 p.m.)  o For questions about schools  and childcare: Call 471-333-9513 or 1-184.906.4767 (7 a.m. to 7 p.m.)

## 2021-10-03 ENCOUNTER — LAB (OUTPATIENT)
Dept: URGENT CARE | Facility: URGENT CARE | Age: 56
End: 2021-10-03
Attending: PHYSICIAN ASSISTANT
Payer: COMMERCIAL

## 2021-10-03 DIAGNOSIS — R05.9 COUGH: ICD-10-CM

## 2021-10-03 PROCEDURE — U0003 INFECTIOUS AGENT DETECTION BY NUCLEIC ACID (DNA OR RNA); SEVERE ACUTE RESPIRATORY SYNDROME CORONAVIRUS 2 (SARS-COV-2) (CORONAVIRUS DISEASE [COVID-19]), AMPLIFIED PROBE TECHNIQUE, MAKING USE OF HIGH THROUGHPUT TECHNOLOGIES AS DESCRIBED BY CMS-2020-01-R: HCPCS

## 2021-10-03 PROCEDURE — U0005 INFEC AGEN DETEC AMPLI PROBE: HCPCS

## 2021-10-04 ENCOUNTER — TELEPHONE (OUTPATIENT)
Dept: EMERGENCY MEDICINE | Facility: CLINIC | Age: 56
End: 2021-10-04

## 2021-10-04 LAB — SARS-COV-2 RNA RESP QL NAA+PROBE: POSITIVE

## 2021-10-04 NOTE — TELEPHONE ENCOUNTER
"-Coronavirus (COVID-19) Notification    Caller Name (Patient, parent, daughter/son, grandparent, etc)  Patient gave verbal permission to speak with wife with the following.    Reason for call  Notify of Positive Coronavirus (COVID-19) lab results, assess symptoms,  review [facility] recommendations    Lab Result    Lab test:  2019-nCoV rRt-PCR or SARS-CoV-2 PCR    Oropharyngeal AND/OR nasopharyngeal swabs is POSITIVE for 2019-nCoV RNA/SARS-COV-2 PCR (COVID-19 virus)    RN Recommendations/Instructions per  [facility] [facility] Coronavirus COVID-19 recommendations    Brief introduction script  Introduce self then review script:  \"I am calling on behalf of [employer].  We were notified that your Coronavirus test (COVID-19) for was POSITIVE for the virus.  I have some information to relay to you but first I wanted to mention that the [place] Dept of Health will be contacting you shortly [it's possible MD already called Patient] to talk to you more about how you are feeling and other people you have had contact with who might now also have the virus.  Also, [facility] is Partnering with the [facility] for Covid-19 research, you may be contacted directly by research staff.\"    Assessment (Inquire about Patient's current symptoms)   Assessment   Current Symptoms at time of phone call: (if no symptoms, document No symptoms] Cough    Symptoms onset (if applicable) 9/28/2021     If at time of call, Patients symptoms hare worsened, the Patient should contact 911 or have someone drive them to Emergency Dept promptly:      If Patient calling 911, inform 911 personal that you have tested positive for the Coronavirus (COVID-19).  Place mask on and await 911 to arrive.    If Emergency Dept, If possible, please have another adult drive you to the Emergency Dept but you need to wear mask when in contact with other people.      Monoclonal Antibody Administration    You may be eligible to receive a new treatment with " "a monoclonal antibody for preventing hospitalization in patients at high risk for complications from COVID-19.   This medication is still experimental and available on a limited basis; it is given through an IV and must be given at an infusion center. Please note that not all people who are eligible will receive the medication since it is in limited supply.     Are you interested in being considered for this medication?  No.   Does the patient fit the criteria: No not discussed and writer was unable to get patient on the phone with 2nd attempt.      If patient qualifies based on above criteria:  \"You will be contacted if you are selected to receive this treatment in the next 1-2 business days.   This is time sensitive and if you are not selected in the next 1-2 business days, you will not receive the medication.  If you do not receive a call to schedule, you have not been selected.\"      Review information with Patient    Your result was positive. This means you have COVID-19 (coronavirus).  We have sent you a letter that reviews the information that I'll be reviewing with you now.    How can I protect others?    If you have symptoms: stay home and away from others (self-isolate) until:    You've had no fever--and no medicine that reduces fever--for 1 full day (24 hours). And       Your other symptoms have gotten better. For example, your cough or breathing has improved. And     At least 10 days have passed since your symptoms started. (If you've been told by a doctor that you have a weak immune system, wait 20 days.)     If you don't have symptoms: Stay home and away from others (self-isolate) until at least 10 days have passed since your first positive COVID-19 test. (Date test collected)    During this time:    Stay in your own room, including for meals. Use your own bathroom if you can.    Stay away from others in your home. No hugging, kissing or shaking hands. No visitors.     Don't go to work, school or " anywhere else.     Clean  high touch  surfaces often (doorknobs, counters, handles, etc.). Use a household cleaning spray or wipes. You'll find a full list on the EPA website at www.epa.gov/pesticide-registration/list-n-disinfectants-use-against-sars-cov-2.     Cover your mouth and nose with a mask, tissue or other face covering to avoid spreading germs.    Wash your hands and face often with soap and water.    Make a list of people you have been in close contact with recently, even if either of you wore a face covering.   ; Start your list from 2 days before you became ill or had a positive test.  ; Include anyone that was within 6 feet of you for a cumulative total of 15 minutes or more in 24 hours. (Example: if you sat next to Alexandr for 5 minutes in the morning and 10 minutes in the afternoon, then you were in close contact for 15 minutes total that day. Alexandr would be added to your list.)    A public health worker will call or text you. It is important that you answer. They will ask you questions about possible exposures to COVID-19, such as people you have been in direct contact with and places you have visited.    Tell the people on your list that you have COVID-19; they should stay away from others for 14 days starting from the last time they were in contact with you (unless you are told something different from a public health worker).     Caregivers in these groups are at risk for severe illness due to COVID-19:  o People 65 years and older  o People who live in a nursing home or long-term care facility  o People with chronic disease (lung, heart, cancer, diabetes, kidney, liver, immunologic)  o People who have a weakened immune system, including those who:  - Are in cancer treatment  - Take medicine that weakens the immune system, such as corticosteroids  - Had a bone marrow or organ transplant  - Have an immune deficiency  - Have poorly controlled HIV or AIDS  - Are obese (body mass index of 40 or  higher)  - Smoke regularly    Caregivers should wear gloves while washing dishes, handling laundry and cleaning bedrooms and bathrooms.    Wash and dry laundry with special caution. Don't shake dirty laundry, and use the warmest water setting you can.    If you have a weakened immune system, ask your doctor about other actions you should take.    For more tips, go to www.cdc.gov/coronavirus/2019-ncov/downloads/10Things.pdf.    You should not go back to work until you meet the guidelines above for ending your home isolation. You don't need to be retested for COVID-19 before going back to work--studies show that you won't spread the virus if it's been at least 10 days since your symptoms started (or 20 days, if you have a weak immune system).    Employers: This document serves as formal notice of your employee's medical guidelines for going back to work. They must meet the above guidelines before going back to work in person.    How can I take care of myself?    1. Get lots of rest. Drink extra fluids (unless a doctor has told you not to).    2. Take Tylenol (acetaminophen) for fever or pain. If you have liver or kidney problems, ask your family doctor if it's okay to take Tylenol.     Take either:     650 mg (two 325 mg pills) every 4 to 6 hours, or     1,000 mg (two 500 mg pills) every 8 hours as needed.     Note: Don't take more than 3,000 mg in one day. Acetaminophen is found in many medicines (both prescribed and over-the-counter medicines). Read all labels to be sure you don't take too much.    For children, check the Tylenol bottle for the right dose (based on their age or weight).    3. If you have other health problems (like cancer, heart failure, an organ transplant or severe kidney disease): Call your specialty clinic if you don't feel better in the next 2 days.    4. Know when to call 911: Emergency warning signs include:    Trouble breathing or shortness of breath    Pain or pressure in the chest that  doesn't go away    Feeling confused like you haven't felt before, or not being able to wake up    Bluish-colored lips or face    5. Sign up for [employer]. We know it's scary to hear that you have COVID-19. We want to track your symptoms to make sure you're okay over the next 2 weeks. Please look for an email from [employer]--this is a free, online program that we'll use to keep in touch. To sign up, follow the link in the email. Learn more at [email or web address].    Where can I get more information?    [facility]: [email or web address]    Coronavirus Basics: www.health.[facility].[place]/diseases/coronavirus/basics.html    What to Do If You're Sick: www.cdc.gov/coronavirus/2019-ncov/about/steps-when-sick.html    Ending Home Isolation: www.cdc.gov/coronavirus/2019-ncov/hcp/disposition-in-home-patients.html     Caring for Someone with COVID-19: www.cdc.gov/coronavirus/2019-ncov/if-you-are-sick/care-for-someone.html     [facility] clinical trials (COVID-19 research studies): clinicalaffairs.[facility]/n-clinical-trials     A Positive COVID-19 letter will be sent via [employer] or the mail. (Exception, no letters sent to Presurgerical/Preprocedure Patients)    Antoinette Rodriguez LPN

## 2021-10-23 ENCOUNTER — HEALTH MAINTENANCE LETTER (OUTPATIENT)
Age: 56
End: 2021-10-23

## 2022-04-20 DIAGNOSIS — I10 ESSENTIAL HYPERTENSION WITH GOAL BLOOD PRESSURE LESS THAN 140/90: ICD-10-CM

## 2022-04-20 DIAGNOSIS — M17.12 PRIMARY OSTEOARTHRITIS OF LEFT KNEE: ICD-10-CM

## 2022-04-21 RX ORDER — MELOXICAM 15 MG/1
15 TABLET ORAL DAILY
Qty: 90 TABLET | Refills: 3 | Status: SHIPPED | OUTPATIENT
Start: 2022-04-21 | End: 2023-04-28

## 2022-04-21 RX ORDER — LISINOPRIL AND HYDROCHLOROTHIAZIDE 20; 25 MG/1; MG/1
1 TABLET ORAL DAILY
Qty: 90 TABLET | Refills: 3 | Status: SHIPPED | OUTPATIENT
Start: 2022-04-21 | End: 2023-04-28

## 2022-04-21 NOTE — TELEPHONE ENCOUNTER
Zestoretic  Routing refill request to provider for review/approval because:  Labs not current:  serum CRE, K+, Na+  BP elevated      Meloxicam  Routing refill request to provider for review/approval because:  Labs not current:  ALT, AST, CBC, CRE  BP elevated      Ritu Cotter RN

## 2022-04-21 NOTE — TELEPHONE ENCOUNTER
Regina       Last Written Prescription Date: 9/29/2016  Last Quantity: 90, # refills: 1  Last Office Visit with [facility] or [facility] prescribing provider: 10/24/2016       Creatinine   Date Value Ref Range Status   09/23/2016 0.70 0.66 - 1.25 mg/dL Final     Lab Results   Component Value Date    AST 17 09/23/2016     Lab Results   Component Value Date    ALT 41 09/23/2016     BP Readings from Last 3 Encounters:   01/27/17 145/79   12/08/16 (!) 148/96   10/24/16 122/78        No

## 2022-05-02 ENCOUNTER — OFFICE VISIT (OUTPATIENT)
Dept: FAMILY MEDICINE | Facility: CLINIC | Age: 57
End: 2022-05-02
Payer: COMMERCIAL

## 2022-05-02 VITALS
DIASTOLIC BLOOD PRESSURE: 78 MMHG | OXYGEN SATURATION: 96 % | SYSTOLIC BLOOD PRESSURE: 134 MMHG | WEIGHT: 315 LBS | HEIGHT: 73 IN | BODY MASS INDEX: 41.75 KG/M2 | HEART RATE: 104 BPM | TEMPERATURE: 96.9 F | RESPIRATION RATE: 10 BRPM

## 2022-05-02 DIAGNOSIS — F33.0 MAJOR DEPRESSIVE DISORDER, RECURRENT EPISODE, MILD (H): ICD-10-CM

## 2022-05-02 DIAGNOSIS — I10 BENIGN ESSENTIAL HYPERTENSION: ICD-10-CM

## 2022-05-02 DIAGNOSIS — Z13.6 SCREENING FOR CARDIOVASCULAR CONDITION: ICD-10-CM

## 2022-05-02 DIAGNOSIS — E66.01 MORBID OBESITY (H): ICD-10-CM

## 2022-05-02 DIAGNOSIS — Z00.01 ENCOUNTER FOR GENERAL ADULT MEDICAL EXAMINATION WITH ABNORMAL FINDINGS: Primary | ICD-10-CM

## 2022-05-02 DIAGNOSIS — D50.8 OTHER IRON DEFICIENCY ANEMIA: ICD-10-CM

## 2022-05-02 DIAGNOSIS — Z12.5 SCREENING FOR PROSTATE CANCER: ICD-10-CM

## 2022-05-02 LAB
ALBUMIN SERPL-MCNC: 3.8 G/DL (ref 3.4–5)
ALP SERPL-CCNC: 85 U/L (ref 40–150)
ALT SERPL W P-5'-P-CCNC: 53 U/L (ref 0–70)
ANION GAP SERPL CALCULATED.3IONS-SCNC: 4 MMOL/L (ref 3–14)
AST SERPL W P-5'-P-CCNC: 24 U/L (ref 0–45)
BILIRUB SERPL-MCNC: 0.7 MG/DL (ref 0.2–1.3)
BUN SERPL-MCNC: 11 MG/DL (ref 7–30)
CALCIUM SERPL-MCNC: 8.7 MG/DL (ref 8.5–10.1)
CHLORIDE BLD-SCNC: 108 MMOL/L (ref 94–109)
CHOLEST SERPL-MCNC: 120 MG/DL
CO2 SERPL-SCNC: 29 MMOL/L (ref 20–32)
CREAT SERPL-MCNC: 0.71 MG/DL (ref 0.66–1.25)
ERYTHROCYTE [DISTWIDTH] IN BLOOD BY AUTOMATED COUNT: 17.9 % (ref 10–15)
FASTING STATUS PATIENT QL REPORTED: YES
GFR SERPL CREATININE-BSD FRML MDRD: >90 ML/MIN/1.73M2
GLUCOSE BLD-MCNC: 121 MG/DL (ref 70–99)
HCT VFR BLD AUTO: 40.5 % (ref 40–53)
HDLC SERPL-MCNC: 39 MG/DL
HGB BLD-MCNC: 12.5 G/DL (ref 13.3–17.7)
LDLC SERPL CALC-MCNC: 67 MG/DL
MCH RBC QN AUTO: 20.8 PG (ref 26.5–33)
MCHC RBC AUTO-ENTMCNC: 30.9 G/DL (ref 31.5–36.5)
MCV RBC AUTO: 68 FL (ref 78–100)
NONHDLC SERPL-MCNC: 81 MG/DL
PLATELET # BLD AUTO: 216 10E3/UL (ref 150–450)
POTASSIUM BLD-SCNC: 4.2 MMOL/L (ref 3.4–5.3)
PROT SERPL-MCNC: 6.8 G/DL (ref 6.8–8.8)
PSA SERPL-MCNC: 2.23 UG/L (ref 0–4)
RBC # BLD AUTO: 6 10E6/UL (ref 4.4–5.9)
SODIUM SERPL-SCNC: 141 MMOL/L (ref 133–144)
TRIGL SERPL-MCNC: 68 MG/DL
WBC # BLD AUTO: 5.6 10E3/UL (ref 4–11)

## 2022-05-02 PROCEDURE — 85027 COMPLETE CBC AUTOMATED: CPT | Performed by: FAMILY MEDICINE

## 2022-05-02 PROCEDURE — 99396 PREV VISIT EST AGE 40-64: CPT | Mod: 25 | Performed by: FAMILY MEDICINE

## 2022-05-02 PROCEDURE — 36415 COLL VENOUS BLD VENIPUNCTURE: CPT | Performed by: FAMILY MEDICINE

## 2022-05-02 PROCEDURE — 90471 IMMUNIZATION ADMIN: CPT | Performed by: FAMILY MEDICINE

## 2022-05-02 PROCEDURE — G0103 PSA SCREENING: HCPCS | Performed by: FAMILY MEDICINE

## 2022-05-02 PROCEDURE — 80061 LIPID PANEL: CPT | Performed by: FAMILY MEDICINE

## 2022-05-02 PROCEDURE — 90750 HZV VACC RECOMBINANT IM: CPT | Performed by: FAMILY MEDICINE

## 2022-05-02 PROCEDURE — 80053 COMPREHEN METABOLIC PANEL: CPT | Performed by: FAMILY MEDICINE

## 2022-05-02 ASSESSMENT — ENCOUNTER SYMPTOMS
HEARTBURN: 0
CONSTIPATION: 0
ABDOMINAL PAIN: 0
EYE PAIN: 0
JOINT SWELLING: 0
WEAKNESS: 0
HEADACHES: 0
MYALGIAS: 0
ARTHRALGIAS: 1
HEMATOCHEZIA: 0
SHORTNESS OF BREATH: 0
SORE THROAT: 0
DIZZINESS: 0
FREQUENCY: 0
CHILLS: 0
HEMATURIA: 0
PALPITATIONS: 0
NERVOUS/ANXIOUS: 0
PARESTHESIAS: 0
NAUSEA: 0
DIARRHEA: 0
COUGH: 0
FEVER: 0
DYSURIA: 0

## 2022-05-02 ASSESSMENT — PATIENT HEALTH QUESTIONNAIRE - PHQ9
SUM OF ALL RESPONSES TO PHQ QUESTIONS 1-9: 6
SUM OF ALL RESPONSES TO PHQ QUESTIONS 1-9: 6
10. IF YOU CHECKED OFF ANY PROBLEMS, HOW DIFFICULT HAVE THESE PROBLEMS MADE IT FOR YOU TO DO YOUR WORK, TAKE CARE OF THINGS AT HOME, OR GET ALONG WITH OTHER PEOPLE: SOMEWHAT DIFFICULT

## 2022-05-02 NOTE — PROGRESS NOTES
SUBJECTIVE:   CC: Sharif Giraldo is an 56 year old male who presents for preventative health visit.         Patient has been advised of split billing requirements and indicates understanding: Yes  Healthy Habits:     Getting at least 3 servings of Calcium per day:  Yes    Bi-annual eye exam:  Yes    Dental care twice a year:  Yes    Sleep apnea or symptoms of sleep apnea:  Sleep apnea    Diet:  Regular (no restrictions)    Frequency of exercise:  None    Taking medications regularly:  Yes    Barriers to taking medications:  None    Medication side effects:  None    PHQ-2 Total Score: 3    Additional concerns today:  No              Today's PHQ-2 Score:   PHQ-2 ( 1999 [employer]) 5/2/2022   Q1: Little interest or pleasure in doing things 2   Q2: Feeling down, depressed or hopeless 1   PHQ-2 Score 3   PHQ-2 Total Score (12-17 Years)- Positive if 3 or more points; Administer PHQ-A if positive -   Q1: Little interest or pleasure in doing things More than half the days   Q2: Feeling down, depressed or hopeless Several days   PHQ-2 Score 3       Abuse: Current or Past(Physical, Sexual or Emotional)- No  Do you feel safe in your environment? Yes        Social History     Tobacco Use     Smoking status: Current Every Day Smoker     Packs/day: 0.01     Years: 10.00     Pack years: 0.10     Types: Cigars     Smokeless tobacco: Never Used     Tobacco comment:  cigars 2/ day   Substance Use Topics     Alcohol use: Yes     Alcohol/week: 0.0 standard drinks     Comment: 1 a week      If you drink alcohol do you typically have >3 drinks per day or >7 drinks per week? No    Alcohol Use 5/2/2022   Prescreen: >3 drinks/day or >7 drinks/week? No   Prescreen: >3 drinks/day or >7 drinks/week? -       Last PSA:   PSA   Date Value Ref Range Status   04/06/2021 2.03 0 - 4 ug/L Final     Comment:     Assay Method:  Chemiluminescence using Siemens Vista analyzer       Reviewed orders with patient. Reviewed health maintenance and updated  orders accordingly - Yes      Reviewed and updated as needed this visit by clinical staff                    Reviewed and updated as needed this visit by Provider                   Past Medical History:   Diagnosis Date     HTN (hypertension) 2012     Morbid obesity with BMI of 60.0-69.9, adult (H)      LUH (obstructive sleep apnea) 5/2013    Severe       Past Surgical History:   Procedure Laterality Date     COLONOSCOPY  9/20/2013    Procedure: COLONOSCOPY;  Colonoscopy;  Surgeon: Avery Morales MD;  Location:  GI     HC EXCISE EXCESS SKIN TISSUE,OTHER      Hx; staph complications of hernia surg     HC KNEE SCOPE, DIAGNOSTIC  1999,2000    Arthroscopy, Knee     HC REMOVAL OF TONSILS,<11 Y/O  1973    Tonsils <10y.o.     HC REPAIR INITIAL INCISIONAL HERNIA; INCARCERATED OR STRANGULATED  09/25/2002    Mesh repair of incarcerated incisional hernia, recurrent.     HERNIA REPAIR, INCISIONAL  10/09/08    Ventral herniorrhaphy/mesh placement.  Excision left elbow mass.     INJECT EPIDURAL CERVICAL N/A 4/12/2018    Procedure: INJECT EPIDURAL CERVICAL;  translaminar epidural injection at cervical 7 - throacic 1;  Surgeon: Phong Andrade MD;  Location:  OR     [facility] AFF EYE MUSCLE SURG PROC UNLISTED  1970,1974     Z ANESTH,REPAIR LO ABD HERNIA NOS  2000,2001    umbilical area       Review of Systems   Constitutional: Negative for chills and fever.   HENT: Negative for congestion, ear pain, hearing loss and sore throat.    Eyes: Negative for pain and visual disturbance.   Respiratory: Negative for cough and shortness of breath.    Cardiovascular: Positive for peripheral edema. Negative for chest pain and palpitations.   Gastrointestinal: Negative for abdominal pain, constipation, diarrhea, heartburn, hematochezia and nausea.   Genitourinary: Positive for impotence. Negative for dysuria, frequency, genital sores, hematuria, penile discharge and urgency.   Musculoskeletal: Positive for arthralgias. Negative for  "joint swelling and myalgias.   Skin: Negative for rash.   Neurological: Negative for dizziness, weakness, headaches and paresthesias.   Psychiatric/Behavioral: Positive for mood changes. The patient is not nervous/anxious.          OBJECTIVE:   /78   Pulse 104   Temp 96.9  F (36.1  C)   Resp 10   Ht 1.865 m (6' 1.43\")   Wt (!) 181.4 kg (400 lb)   SpO2 96%   BMI 52.16 kg/m      Physical Exam  GENERAL: healthy, alert and no distress  EYES: Eyes grossly normal to inspection, PERRL and conjunctivae and sclerae normal  HENT: ear canals and TM's normal, nose and mouth without ulcers or lesions  NECK: no adenopathy, no asymmetry, masses, or scars and thyroid normal to palpation  RESP: lungs clear to auscultation - no rales, rhonchi or wheezes  CV: regular rate and rhythm, normal S1 S2, no S3 or S4, no murmur, click or rub, no peripheral edema and peripheral pulses strong  ABDOMEN: soft, nontender, no hepatosplenomegaly, no masses and bowel sounds normal  MS: no gross musculoskeletal defects noted, no edema  SKIN: no suspicious lesions or rashes  NEURO: Normal strength and tone, mentation intact and speech normal  PSYCH: mentation appears normal, affect normal/bright    Diagnostic Test Results:  Labs reviewed in Epic  Results for orders placed or performed in visit on 05/02/22 (from the past 24 hour(s))   Lipid panel reflex to direct LDL Fasting   Result Value Ref Range    Cholesterol 120 <200 mg/dL    Triglycerides 68 <150 mg/dL    Direct Measure HDL 39 (L) >=40 mg/dL    LDL Cholesterol Calculated 67 <=100 mg/dL    Non HDL Cholesterol 81 <130 mg/dL    Patient Fasting > 8hrs? Yes     Narrative    Cholesterol  Desirable:  <200 mg/dL    Triglycerides  Normal:  Less than 150 mg/dL  Borderline High:  150-199 mg/dL  High:  200-499 mg/dL  Very High:  Greater than or equal to 500 mg/dL    Direct Measure HDL  Female:  Greater than or equal to 50 mg/dL   Male:  Greater than or equal to 40 mg/dL    LDL " Cholesterol  Desirable:  <100mg/dL  Above Desirable:  100-129 mg/dL   Borderline High:  130-159 mg/dL   High:  160-189 mg/dL   Very High:  >= 190 mg/dL    Non HDL Cholesterol  Desirable:  130 mg/dL  Above Desirable:  130-159 mg/dL  Borderline High:  160-189 mg/dL  High:  190-219 mg/dL  Very High:  Greater than or equal to 220 mg/dL   Comprehensive metabolic panel (BMP + Alb, Alk Phos, ALT, AST, Total. Bili, TP)   Result Value Ref Range    Sodium 141 133 - 144 mmol/L    Potassium 4.2 3.4 - 5.3 mmol/L    Chloride 108 94 - 109 mmol/L    Carbon Dioxide (CO2) 29 20 - 32 mmol/L    Anion Gap 4 3 - 14 mmol/L    Urea Nitrogen 11 7 - 30 mg/dL    Creatinine 0.71 0.66 - 1.25 mg/dL    Calcium 8.7 8.5 - 10.1 mg/dL    Glucose 121 (H) 70 - 99 mg/dL    Alkaline Phosphatase 85 40 - 150 U/L    AST 24 0 - 45 U/L    ALT 53 0 - 70 U/L    Protein Total 6.8 6.8 - 8.8 g/dL    Albumin 3.8 3.4 - 5.0 g/dL    Bilirubin Total 0.7 0.2 - 1.3 mg/dL    GFR Estimate >90 >60 mL/min/1.73m2   CBC with platelets   Result Value Ref Range    WBC Count 5.6 4.0 - 11.0 10e3/uL    RBC Count 6.00 (H) 4.40 - 5.90 10e6/uL    Hemoglobin 12.5 (L) 13.3 - 17.7 g/dL    Hematocrit 40.5 40.0 - 53.0 %    MCV 68 (L) 78 - 100 fL    MCH 20.8 (L) 26.5 - 33.0 pg    MCHC 30.9 (L) 31.5 - 36.5 g/dL    RDW 17.9 (H) 10.0 - 15.0 %    Platelet Count 216 150 - 450 10e3/uL   PSA, screen   Result Value Ref Range    Prostate Specific Antigen Screen 2.23 0.00 - 4.00 ug/L    Narrative    Assay Method:  Chemiluminescence using Siemens   Vista analyzer.       ASSESSMENT/PLAN:   (Z00.01) Encounter for general adult medical examination with abnormal findings  (primary encounter diagnosis)  Comment: See discussion below.  Plan:     (I10) Benign essential hypertension  Comment: Meds were refilled we will notify him with his lab results.  Plan: Comprehensive metabolic panel (BMP + Alb, Alk         Phos, ALT, AST, Total. Bili, TP)            (Z12.5) Screening for prostate cancer  Comment:   Plan:  "PSA, screen            (D50.8) Other iron deficiency anemia  Comment: We will follow-up on this it was noted a year ago.  Plan: CBC with platelets            (Z13.6) Screening for cardiovascular condition  Comment: We will notify with results.Plan: Lipid panel reflex to direct LDL Fasting        .    (F33.0) Major depressive disorder, recurrent episode, mild (H)  Comment: Not an issue right now not on anything.  Plan:     (E66.01) Morbid obesity (H)  Comment: Discussed diet and exercise  Plan:     Patient has been advised of split billing requirements and indicates understanding: Yes    COUNSELING:   Reviewed preventive health counseling, as reflected in patient instructions       Regular exercise       Healthy diet/nutrition       Vision screening       Colorectal cancer screening    Estimated body mass index is 52.16 kg/m  as calculated from the following:    Height as of this encounter: 1.865 m (6' 1.43\").    Weight as of this encounter: 181.4 kg (400 lb).     Weight management plan: Discussed healthy diet and exercise guidelines    He reports that he has been smoking cigars. He has a 0.10 pack-year smoking history. He has never used smokeless tobacco.  Tobacco Cessation Action Plan:   Self help information given to patient      Counseling Resources:  ATP IV Guidelines  Pooled Cohorts Equation Calculator  FRAX Risk Assessment  ICSI Preventive Guidelines  Dietary Guidelines for Americans, 2010  USDA's MyPlate  ASA Prophylaxis  Lung CA Screening    Avery Zapata MD  [facility]  Answers for HPI/ROS submitted by the patient on 5/2/2022  If you checked off any problems, how difficult have these problems made it for you to do your work, take care of things at home, or get along with other people?: Somewhat difficult  PHQ9 TOTAL SCORE: 6      "

## 2022-05-02 NOTE — NURSING NOTE
Prior to injection, verified patient identity using patient's name and date of birth.  Due to injection administration, patient instructed to remain in clinic for 15 minutes  afterwards, and to report any adverse reaction to me immediately.    Screening Questionnaire for Adult Immunization     Are you sick today?   No    Do you have allergies to medications, food or any vaccine?   No    Have you ever had a serious reaction after receiving a vaccination?   No    Do you have a long-term health problem with heart disease, lung disease,  asthma, kidney disease, diabetes, anemia, metabolic or blood disease?   No    Do you have cancer, leukemia, AIDS, or any immune system problem?   No    Do you take cortisone, prednisone, other steroids, or anticancer drugs, or  have you had any x-ray (radiation) treatments?   No    Have you had a seizure, brain, or other nervous system problem?   No    During the past year, have you received a transfusion of blood or blood       products, or been given a medicine called immune (gamma) globulin?   No    For women: Are you pregnant or is there a chance you could become         pregnant during the next month?   No    Have you received any vaccinations in the past 4 weeks?   No     Immunization questionnaire answers were all negative.      MNVFC doesn't apply on this patient    Per orders of Dr. Zapata, injection of Shinrix was  given by Yana Khan MA. Patient instructed to remain in clinic for 20 minutes afterwards, and to report any adverse reaction to me immediately.       Screening performed by Yana Khan MA on 5/2/2022 at 7:36 AM.

## 2022-05-02 NOTE — LETTER
May 25, 2022      FERNANDA Giraldo  [address] [facility] [id number]        Dear ,    We are writing to inform you of your test results.    Your labs showed a PSA of 2.23 which is fine.  Cholesterol is 120 which is fine.  Blood sugar was 121 which is in an upper range of borderline diabetes.  This should be rechecked in about 6 months with a fasting blood glucose and hemoglobin A1c test and you need to work on weight loss and cutting down on carbohydrates.    Resulted Orders   Lipid panel reflex to direct LDL Fasting   Result Value Ref Range    Cholesterol 120 <200 mg/dL    Triglycerides 68 <150 mg/dL    Direct Measure HDL 39 (L) >=40 mg/dL    LDL Cholesterol Calculated 67 <=100 mg/dL    Non HDL Cholesterol 81 <130 mg/dL    Patient Fasting > 8hrs? Yes     Narrative    Cholesterol  Desirable:  <200 mg/dL    Triglycerides  Normal:  Less than 150 mg/dL  Borderline High:  150-199 mg/dL  High:  200-499 mg/dL  Very High:  Greater than or equal to 500 mg/dL    Direct Measure HDL  Female:  Greater than or equal to 50 mg/dL   Male:  Greater than or equal to 40 mg/dL    LDL Cholesterol  Desirable:  <100mg/dL  Above Desirable:  100-129 mg/dL   Borderline High:  130-159 mg/dL   High:  160-189 mg/dL   Very High:  >= 190 mg/dL    Non HDL Cholesterol  Desirable:  130 mg/dL  Above Desirable:  130-159 mg/dL  Borderline High:  160-189 mg/dL  High:  190-219 mg/dL  Very High:  Greater than or equal to 220 mg/dL   Comprehensive metabolic panel (BMP + Alb, Alk Phos, ALT, AST, Total. Bili, TP)   Result Value Ref Range    Sodium 141 133 - 144 mmol/L    Potassium 4.2 3.4 - 5.3 mmol/L    Chloride 108 94 - 109 mmol/L    Carbon Dioxide (CO2) 29 20 - 32 mmol/L    Anion Gap 4 3 - 14 mmol/L    Urea Nitrogen 11 7 - 30 mg/dL    Creatinine 0.71 0.66 - 1.25 mg/dL    Calcium 8.7 8.5 - 10.1 mg/dL    Glucose 121 (H) 70 - 99 mg/dL    Alkaline Phosphatase 85 40 - 150 U/L    AST 24 0 - 45 U/L    ALT 53 0 - 70 U/L    Protein Total 6.8 6.8  - 8.8 g/dL    Albumin 3.8 3.4 - 5.0 g/dL    Bilirubin Total 0.7 0.2 - 1.3 mg/dL    GFR Estimate >90 >60 mL/min/1.73m2      Comment:      Effective December 21, 2021 eGFRcr in adults is calculated using the 2021 CKD-EPI creatinine equation which includes age and gender (Heather luz al., [place], DOI: 10.1056/VVQImp8498849)   CBC with platelets   Result Value Ref Range    WBC Count 5.6 4.0 - 11.0 10e3/uL    RBC Count 6.00 (H) 4.40 - 5.90 10e6/uL    Hemoglobin 12.5 (L) 13.3 - 17.7 g/dL    Hematocrit 40.5 40.0 - 53.0 %    MCV 68 (L) 78 - 100 fL    MCH 20.8 (L) 26.5 - 33.0 pg    MCHC 30.9 (L) 31.5 - 36.5 g/dL    RDW 17.9 (H) 10.0 - 15.0 %    Platelet Count 216 150 - 450 10e3/uL   PSA, screen   Result Value Ref Range    Prostate Specific Antigen Screen 2.23 0.00 - 4.00 ug/L    Narrative    Assay Method:  Chemiluminescence using Siemens   Vista analyzer.       If you have any questions or concerns, please call the clinic at the number listed above.       Sincerely,      Avery Zapata MD

## 2022-05-03 ASSESSMENT — PATIENT HEALTH QUESTIONNAIRE - PHQ9: SUM OF ALL RESPONSES TO PHQ QUESTIONS 1-9: 6

## 2022-09-02 ENCOUNTER — TELEPHONE (OUTPATIENT)
Dept: SLEEP MEDICINE | Facility: CLINIC | Age: 57
End: 2022-09-02

## 2022-09-02 NOTE — TELEPHONE ENCOUNTER
SPOKE TO THE PT AND HE STOPPED INTO THE [facility] YESTERDAY AND PENELOPE HAD ONE POWER CORD FOR HIM. SO HE IS GOOD TO GO NOW.

## 2022-10-09 ENCOUNTER — HEALTH MAINTENANCE LETTER (OUTPATIENT)
Age: 57
End: 2022-10-09

## 2022-10-12 NOTE — PROGRESS NOTES
Does Sharif have a CPAP/Bipap?  Yes     Type of mask: NASAL     [facility]: [facility] [phone]    [email or web address]     [place] Sleep Scale: 7      Sleep Apnea - Follow-up Visit:    Impression/Plan:     Severe Sleep apnea. He is Tolerating PAP well, but having increased daytime drowsiness. Machine is old, difficult to get download, humidifier is no longer working. New machine and supplies ordered.  Daytime symptoms are worsened.       Sharif Giraldo will follow up in about 3 month(s).     Fifteen minutes spent with patient, all of which were spent face-to-face counseling, consulting, coordinating plan of care.      CC:  Avery Zapata,         History of Present Illness:  Chief Complaint   Patient presents with     CPAP Follow Up       Sharif Giraldo presents for follow-up of their severe sleep apnea, managed with BPAP.     No specialty comments available.    Do you use a CPAP Machine at home: Yes  Overall, on a scale of 0-10 how would you rate your CPAP (0 poor, 10 great): 9    What type of mask do you use: Nasal Mask  Is your mask comfortable: Yes  If not, why:      Is your mask leaking: Yes  If yes, where do you feel it: Around nose  How many night per week does the mask leak (0-7): 3    Do you notice snoring with mask on: No  Do you notice gasping arousals with mask on: No  Are you having significant oral or nasal dryness: No  Is the pressure setting comfortable: Yes  If not, why:      What is your typical bedtime: 10:00 pm  How long does it take you to go to sleep on PAP therapy: 10 - 60 minutes  What time do you typically get out of bed for the day: 5:30 am  How many hours on average per night are you using PAP therapy: 7  How many hours are you sleeping per night: 6  Do you feel well rested in the morning: Yes        Respironics      BIPAP AUTO 25 - 10 cmH2O 30 day usage data:    100% of days with > 4 hours of use. 0/30 days with no use.   Average  use 7 hours 16 min minutes per day.   Average leak 0 LPM.  Average % of night in large leak 0%.    IPAP 90% pressure 11cm.   EPAP 90% pressure 13.7cm.   AHI 1.4 events per hour.         EPWORTH SLEEPINESS SCALE      [place] Sleepiness Scale ( BHUMIKA Iglesias  5050-8581<br>ESS - [place]/English - Final version - 21 Nov 07 - [facility] [employer].) 10/14/2022   Sitting and reading Slight chance of dozing   Watching TV Slight chance of dozing   Sitting, inactive in a public place (e.g. a theatre or a meeting) Slight chance of dozing   As a passenger in a car for an hour without a break Slight chance of dozing   Lying down to rest in the afternoon when circumstances permit Slight chance of dozing   Sitting and talking to someone Would never doze   Sitting quietly after a lunch without alcohol Slight chance of dozing   In a car, while stopped for a few minutes in traffic Slight chance of dozing   [place] Score (MC) 7   [place] Score (Sleep) 7       INSOMNIA SEVERITY INDEX (CAYLA)      Insomnia Severity Index (CAYLA) 10/14/2022   Difficulty falling asleep 1   Difficulty staying asleep 1   Problems waking up too early 1   How SATISFIED/DISSATISFIED are you with your CURRENT sleep pattern? 1   How NOTICEABLE to others do you think your sleep problem is in terms of impairing the quality of your life? 1   How WORRIED/DISTRESSED are you about your current sleep problem? 0   To what extent do you consider your sleep problem to INTERFERE with your daily functioning (e.g. daytime fatigue, mood, ability to function at work/daily chores, concentration, memory, mood, etc.) CURRENTLY? 0   CAYLA Total Score 5       Guidelines for Scoring/Interpretation:  Total score categories:  0-7 = No clinically significant insomnia   8-14 = Subthreshold insomnia   15-21 = Clinical insomnia (moderate severity)  22-28 = Clinical insomnia (severe)  Used via courtesy of www.[employer].va.gov with permission from David Blue PhD., [facility]      Past  medical/surgical history, family history, social history, medications and allergies were reviewed.        Problem List:  Patient Active Problem List    Diagnosis Date Noted     Cervical radiculopathy 05/02/2018     Priority: Medium     Cervical pain 03/12/2018     Priority: Medium     Osteoarthritis of spine with radiculopathy, cervical region 03/07/2018     Priority: Medium     Carpal tunnel syndrome of left wrist 03/07/2018     Priority: Medium     Major depressive disorder, recurrent episode, mild (H) 09/23/2016     Priority: Medium     Thalassemia carrier 06/11/2013     Priority: Medium     Hypogonadism male 05/30/2013     Priority: Medium     LUH (obstructive sleep apnea)-very severe () 05/14/2013     Priority: Medium     Indications for Polysomnography 5/8/2013: The patient is a 47 y year old Male who is 6' and weighs 448.1 lbs.  His BMI equals 61.3.  The patients Norris sleepiness scale was 21.0 and neck size was 21.5.  A diagnostic polysomnogram was performed to evaluate for excessive sleepiness, snoring, and possible LUH. After 121.0 minutes of sleep time the patient exhibited sufficient respiratory events qualifying him for a CPAP trial which was then initiated.      Polysomnogram Data:  A full night polysomnogram was performed recording the standard physiologic parameters including EEG, EOG, EMG, EKG, nasal and oral airflow.  Respiratory parameters of chest and abdominal movements are recorded with respiratory inductance plethysmography.  Oxygen saturation was recorded by pulse oximetry.      Diagnostic PSG  Sleep Architecture:  The total recording time of the diagnostic portion of the study was 134.7 minutes.  The total sleep time was 121.0 minutes.  During the diagnostic portion of the study the sleep latency was 0.2 minutes.  REM latency was 116.0 minutes.  Sleep Efficiency was 89.8%.  Wake after sleep onset was 13.0.   The patient spent 21.5% of total sleep time in Stage N1, 69.8% in Stage N2,  2.9% in Stages N3 and 5.8% in REM.         Respiration:     Sustained Sleep Associated Hypoventilation - was present with a baseline TCM of 45 and a maximum TCM of 54. PaCO2 was 44.    Sleep Associated Hypoxemia - was present and was very severe.  Baseline oxygen saturation was 86.8%.  The lowest oxygen saturation was 32.0%.  Snoring was reported as moderately loud to loud.     Events - During the diagnostic portion of the study, the polysomnogram revealed a presence of 167 obstructive, 1 central, and 1 mixed apneas resulting in an Apnea index of 83.8 events per hour.  There were 38 hypopneas resulting in a Hypopnea index of 18.8 events per hour.  The combined Apnea/Hypopnea Index was 102.6 events per hour.  The REM AHI was 60.0.  The RERA index was 0 per hour.   The RDI was 103.    - 102.6     Treatment PSG  Sleep Architecture:  At 12:24 am the patient was placed on CPAP and then bi-level treatment was titrated.  The total recording time of the treatment portion of the study was 366.2 minutes.  The total sleep time was 350.5 minutes.  During the treatment portion of the study the sleep latency was 0 minutes.  REM latency was 66.0 minutes.  Sleep Efficiency was 95.7%.  Sleep Maintenance Efficiency was 96.0%.  Total wake time was 16.0 minutes for a total wake percentage of 3.8%. the patient spent 6.3% of total sleep time in Stage N1, 20.1% in Stage N2, 40.7% in Stages N3 and N, and 33.0% in REM.       Respiration:  CPAP was titrated from 5 cmH2O to 1 5cmH20 but oxygen desaturations persisted into the 70%s so bi-level PAP was initiated.  Bi-level was titrated to 20/76nrM67 in response to hypopneas and low O2 saturations.  The final pressure was 20.0/14.0 with an AHI of 11.0 all of which were central apneas. Supine REM noted at 18/13-12 cmH2O. AHI was 11 but all were centrals. TCM returned to improved levels (38) with Bi-level.     Movement Activity:      Limb - During the diagnostic portion of the study, there were no  limb movements recorded. During the treatment portion of the study, there were 376 limb movements recorded.  Of this total, 367 were classified as PLMs.  Of the PLMs, none were associated with arousals.  The Limb Movement index was 64.4 per hour while the PLM index was 62.8 per hour.     Behavior - none    Bruxism - none    Seizure - none      CARDIAC SUMMARY:   During the diagnostic portion of the study, the average pulse rate was 84.7 bpm.  The minimum pulse rate was 35.0 bpm while the maximum pulse rate was 124.3 bpm.    During the treatment portion of the study, the average pulse rate was 80.8 bpm.  The minimum pulse rate was 53.8 bpm while the maximum pulse rate was 125.1 bpm.       Assessment:    Very severe LUH () with adequate positive airway pressure titration.    Sleep associated hypoventilation secondary to obesity (OHS)  Recommendations:    Bi-level PAP pressure 18/13 cmH2O with clinical follow-up within 3 - 4 weeks including compliance measures. If pressure too high, or if AHI unacceptably high, proceed to full night CPAP/Bi-level PAP titration study.     Advise regarding the risks of drowsy driving    Suggest optimizing sleep hygiene and avoiding sleep deprivation    Weight management    Dopaminergic therapy should be used for management of restless leg syndrome (RLS) and not based on the presence of periodic limb movements alone.       Sleep related hypoventilation/hypoxemia in other disease 05/14/2013     Priority: Medium     Problem list name updated by automated process. Provider to review       Nocturia 04/18/2013     Priority: Medium     Anemia 04/18/2013     Priority: Medium     Benign essential hypertension 10/18/2012     Priority: Medium     CARDIOVASCULAR SCREENING; LDL GOAL LESS THAN 130 10/31/2010     Priority: Medium     Morbid obesity (H) 05/07/2008     Priority: Medium     Umbilical hernia 08/13/2002     Priority: Medium     Problem list name updated by automated process. Provider  "to review          /78   Pulse 86   Ht 1.865 m (6' 1.43\")   Wt (!) 173.3 kg (382 lb)   SpO2 96%   BMI 49.81 kg/m      "

## 2022-10-14 ENCOUNTER — OFFICE VISIT (OUTPATIENT)
Dept: SLEEP MEDICINE | Facility: CLINIC | Age: 57
End: 2022-10-14
Payer: COMMERCIAL

## 2022-10-14 VITALS
HEIGHT: 73 IN | OXYGEN SATURATION: 96 % | DIASTOLIC BLOOD PRESSURE: 78 MMHG | HEART RATE: 86 BPM | BODY MASS INDEX: 41.75 KG/M2 | WEIGHT: 315 LBS | SYSTOLIC BLOOD PRESSURE: 134 MMHG

## 2022-10-14 DIAGNOSIS — G47.33 OSA (OBSTRUCTIVE SLEEP APNEA): Primary | ICD-10-CM

## 2022-10-14 PROCEDURE — 99213 OFFICE O/P EST LOW 20 MIN: CPT | Performed by: PHYSICIAN ASSISTANT

## 2022-10-14 ASSESSMENT — SLEEP AND FATIGUE QUESTIONNAIRES
HOW LIKELY ARE YOU TO NOD OFF OR FALL ASLEEP WHILE WATCHING TV: SLIGHT CHANCE OF DOZING
HOW LIKELY ARE YOU TO NOD OFF OR FALL ASLEEP WHILE SITTING AND TALKING TO SOMEONE: WOULD NEVER DOZE
HOW LIKELY ARE YOU TO NOD OFF OR FALL ASLEEP WHILE SITTING AND READING: SLIGHT CHANCE OF DOZING
HOW LIKELY ARE YOU TO NOD OFF OR FALL ASLEEP WHILE SITTING QUIETLY AFTER LUNCH WITHOUT ALCOHOL: SLIGHT CHANCE OF DOZING
HOW LIKELY ARE YOU TO NOD OFF OR FALL ASLEEP IN A CAR, WHILE STOPPED FOR A FEW MINUTES IN TRAFFIC: SLIGHT CHANCE OF DOZING
HOW LIKELY ARE YOU TO NOD OFF OR FALL ASLEEP WHILE SITTING INACTIVE IN A PUBLIC PLACE: SLIGHT CHANCE OF DOZING
HOW LIKELY ARE YOU TO NOD OFF OR FALL ASLEEP WHEN YOU ARE A PASSENGER IN A CAR FOR AN HOUR WITHOUT A BREAK: SLIGHT CHANCE OF DOZING
HOW LIKELY ARE YOU TO NOD OFF OR FALL ASLEEP WHILE LYING DOWN TO REST IN THE AFTERNOON WHEN CIRCUMSTANCES PERMIT: SLIGHT CHANCE OF DOZING

## 2023-03-01 NOTE — NURSING NOTE
Health Maintenance Due   Topic Date Due     Pneumococcal Vaccine: Pediatrics (0 to 5 Years) and At-Risk Patients (6 to 64 Years) (1 of 4 - PCV13) Never done     HIV SCREENING  Never done     COVID-19 Vaccine (1) Never done     ZOSTER IMMUNIZATION (1 of 2) Never done     INFLUENZA VACCINE (1) 09/01/2020     PREVENTIVE CARE VISIT  02/17/2021     Jessika Dean, [facility]     [phone]

## 2023-03-31 ENCOUNTER — DOCUMENTATION ONLY (OUTPATIENT)
Dept: SLEEP MEDICINE | Facility: CLINIC | Age: 58
End: 2023-03-31
Payer: COMMERCIAL

## 2023-03-31 DIAGNOSIS — G47.33 OSA (OBSTRUCTIVE SLEEP APNEA): Primary | ICD-10-CM

## 2023-03-31 NOTE — PROGRESS NOTES
Patient was offered choice of vendor and chose [facility].  Patient Sharif Giraldo was set up at [place] on March 31, 2023. Patient received a Resmed Aircurve 10 Pressures were set at 25/10 cm H2O.   Patient s ramp is 0 cm H2O for Off and FLEX/EPR is EPR.  Patient received a Resmed Mask name: mirage [email or web address]  Nasal mask size Wide, heated tubing and heated humidifier.  Patient has the following compliance requirements: using and visit requirements  Patient has a follow up on 5/19/23 with Raulito Escobar

## 2023-04-03 ENCOUNTER — DOCUMENTATION ONLY (OUTPATIENT)
Dept: SLEEP MEDICINE | Facility: CLINIC | Age: 58
End: 2023-04-03
Payer: COMMERCIAL

## 2023-04-03 NOTE — PROGRESS NOTES
3 day Sleep therapy management telephone visit    Diagnostic AHI: 102.6  PSG    Confirmed with patient at time of call- N/A Patient is still interested in STM service       Data only recheck        Objective data     Order Settings for PAP  Bi-Level     Mode VAuto  Max IPAP 25 cmH2O  Min EPAP 10 cmH2O  Pressure Support 4 cmH2O               Device settings from machine                           Assessment: Nightly usage over four hours      Action plan: Patient to have 14 day STM visit. Patient has a follow up visit scheduled:   yes within 31-90 days of set up    Replacement device: No  STM ordered by provider: Yes     Total time spent on accessing and  interpreting remote patient PAP therapy data  10 minutes    Total time spent counseling, coaching  and reviewing PAP therapy data with patient  0 minutes    [id number] no

## 2023-04-17 ENCOUNTER — DOCUMENTATION ONLY (OUTPATIENT)
Dept: SLEEP MEDICINE | Facility: CLINIC | Age: 58
End: 2023-04-17
Payer: COMMERCIAL

## 2023-04-17 NOTE — PROGRESS NOTES
14  DAY STM VISIT    Diagnostic AHI: 102.6  PSG    Data only recheck     Assessment: Pt meeting objective benchmarks.       Action plan: pt to have 30 day STM visit.      Device type: Bilevel    PAP settings: Mode VAuto  Max IPAP 25 cmH2O  Min EPAP 10 cmH2O  Pressure Support 4 cmH2O    Mask type:  Nasal Mask    Objective measures: 14 day rolling measures      Compliance  71 %      Leak  22.25  lpm  last  upload      AHI 0.49   last  upload      Average number of minutes 277      Objective measure goal  Compliance   Goal >70%  Leak   Goal < 24 lpm  AHI  Goal < 5  Usage  Goal >240        Total time spent on accessing and interpreting remote patient PAP therapy data  10 minutes    Total time spent counseling, coaching  and reviewing PAP therapy data with patient  0 minutes    [id number]  [id number]  no (3 day STM)

## 2023-04-20 ENCOUNTER — PATIENT OUTREACH (OUTPATIENT)
Dept: CARE COORDINATION | Facility: CLINIC | Age: 58
End: 2023-04-20
Payer: COMMERCIAL

## 2023-04-27 DIAGNOSIS — I10 ESSENTIAL HYPERTENSION WITH GOAL BLOOD PRESSURE LESS THAN 140/90: ICD-10-CM

## 2023-04-27 DIAGNOSIS — M17.12 PRIMARY OSTEOARTHRITIS OF LEFT KNEE: ICD-10-CM

## 2023-04-27 NOTE — TELEPHONE ENCOUNTER
Reason for Call:  Other prescription    Detailed comments: patient called and scheduled an appointment with Martín Petersen at [facility] in June.    Patient will be out of medications for:    1)  Lisnopril  2)  Meloxicam    Would like before this appointment.    Would like to pickup at [facility] [place].    Please contact patient.  Thank you.    Phone Number Patient can be reached at: Home number on file [phone] (home)    Best Time: any    Can we leave a detailed message on this number? YES    Call taken on 4/27/2023 at 1:05 PM by Liza Dhillon

## 2023-04-28 RX ORDER — LISINOPRIL AND HYDROCHLOROTHIAZIDE 20; 25 MG/1; MG/1
1 TABLET ORAL DAILY
Qty: 90 TABLET | Refills: 0 | Status: SHIPPED | OUTPATIENT
Start: 2023-04-28 | End: 2023-06-01

## 2023-04-28 RX ORDER — MELOXICAM 15 MG/1
15 TABLET ORAL DAILY
Qty: 90 TABLET | Refills: 0 | Status: SHIPPED | OUTPATIENT
Start: 2023-04-28 | End: 2023-06-01

## 2023-04-28 NOTE — TELEPHONE ENCOUNTER
Makenna graham, patient has an appointment scheduled.  Inocencia Wilson RN on 4/28/2023 at 3:59 PM

## 2023-05-02 ENCOUNTER — DOCUMENTATION ONLY (OUTPATIENT)
Dept: SLEEP MEDICINE | Facility: CLINIC | Age: 58
End: 2023-05-02
Payer: COMMERCIAL

## 2023-05-02 NOTE — PROGRESS NOTES
30 DAY STM VISIT    Diagnostic AHI: 102.6  PSG    Data only recheck     Assessment: Pt meeting objective benchmarks.     Action plan: pt to have 6 month STM visit  Patient has scheduled a follow up visit with Raulito Greenberg PA-C on 5/19/2023.   Device type: Bilevel  PAP settings: Mode VAuto  Max IPAP 25 cmH2O  Min EPAP 10 cmH2O  Pressure Support 4 cmH2O  Mask type:  Nasal Mask  Objective measures: 14 day rolling measures      Compliance  100 %      Leak  20.4 lpm  last  upload      AHI 0.57   last  upload      Average number of minutes 401      Objective measure goal  Compliance   Goal >70%  Leak   Goal < 24 lpm  AHI  Goal < 5  Usage  Goal >240        Total time spent on accessing and interpreting remote patient PAP therapy data  10 minutes    Total time spent counseling, coaching  and reviewing PAP therapy data with patient  0 minutes     [id number] this call  [id number] no  at 3 or 14 day [facility]

## 2023-05-15 NOTE — PROGRESS NOTES
Does Sharif have a CPAP/Bipap?  Yes     Type of mask: nasal     [facility]: [facility] [phone]    [email or web address]     [place] Sleep Scale: 6

## 2023-05-19 ENCOUNTER — OFFICE VISIT (OUTPATIENT)
Dept: SLEEP MEDICINE | Facility: CLINIC | Age: 58
End: 2023-05-19
Payer: COMMERCIAL

## 2023-05-19 VITALS
OXYGEN SATURATION: 97 % | RESPIRATION RATE: 18 BRPM | DIASTOLIC BLOOD PRESSURE: 80 MMHG | SYSTOLIC BLOOD PRESSURE: 150 MMHG | WEIGHT: 315 LBS | BODY MASS INDEX: 40.43 KG/M2 | HEIGHT: 74 IN | HEART RATE: 100 BPM

## 2023-05-19 DIAGNOSIS — G47.33 OSA (OBSTRUCTIVE SLEEP APNEA): Primary | ICD-10-CM

## 2023-05-19 PROCEDURE — 99213 OFFICE O/P EST LOW 20 MIN: CPT | Performed by: PHYSICIAN ASSISTANT

## 2023-05-19 ASSESSMENT — SLEEP AND FATIGUE QUESTIONNAIRES
HOW LIKELY ARE YOU TO NOD OFF OR FALL ASLEEP WHILE SITTING AND TALKING TO SOMEONE: WOULD NEVER DOZE
HOW LIKELY ARE YOU TO NOD OFF OR FALL ASLEEP WHILE SITTING INACTIVE IN A PUBLIC PLACE: SLIGHT CHANCE OF DOZING
HOW LIKELY ARE YOU TO NOD OFF OR FALL ASLEEP WHILE LYING DOWN TO REST IN THE AFTERNOON WHEN CIRCUMSTANCES PERMIT: SLIGHT CHANCE OF DOZING
HOW LIKELY ARE YOU TO NOD OFF OR FALL ASLEEP WHEN YOU ARE A PASSENGER IN A CAR FOR AN HOUR WITHOUT A BREAK: SLIGHT CHANCE OF DOZING
HOW LIKELY ARE YOU TO NOD OFF OR FALL ASLEEP IN A CAR, WHILE STOPPED FOR A FEW MINUTES IN TRAFFIC: SLIGHT CHANCE OF DOZING
HOW LIKELY ARE YOU TO NOD OFF OR FALL ASLEEP WHILE WATCHING TV: SLIGHT CHANCE OF DOZING
HOW LIKELY ARE YOU TO NOD OFF OR FALL ASLEEP WHILE SITTING QUIETLY AFTER LUNCH WITHOUT ALCOHOL: WOULD NEVER DOZE
HOW LIKELY ARE YOU TO NOD OFF OR FALL ASLEEP WHILE SITTING AND READING: SLIGHT CHANCE OF DOZING

## 2023-05-19 NOTE — PROGRESS NOTES
Sleep Apnea - Follow-up Visit:    Impression/Plan:    Severe Sleep apnea. He is Tolerating PAP well, apnea is well controlled. Likes the new BIPAP, using his old one on the weekends when away from primary residence. . Daytime symptoms are stable.   Supplies re ordered    Sharif Giraldo will follow up in about 1 year(s).     Fifteen minutes spent with patient, all of which were spent face-to-face counseling, consulting, coordinating plan of care.      CC:  No Ref-Primary, Physician,         History of Present Illness:  Chief Complaint   Patient presents with     CPAP Follow Up       Sharif Giraldo presents for follow-up of their severe sleep apnea, managed with BPAP.     No specialty comments available.    Do you use a CPAP Machine at home: No  Overall, on a scale of 0-10 how would you rate your CPAP (0 poor, 10 great):  9    What type of mask do you use:  Nasal  Is your mask comfortable:  yes  If not, why:      Is your mask leaking:  no  If yes, where do you feel it:    How many night per week does the mask leak (0-7):  0    Do you notice snoring with mask on:  no  Do you notice gasping arousals with mask on:  occasional  Are you having significant oral or nasal dryness:  no  Is the pressure setting comfortable:  yes  If not, why:      What is your typical bedtime:  11  How long does it take you to go to sleep on PAP therapy:  15-60 min  What time do you typically get out of bed for the day:  5:30  How many hours on average per night are you using PAP therapy:  6-6.5  How many hours are you sleeping per night:  6-6.5  Do you feel well rested in the morning:  yes      ResMed   CPAP  cmH2O 30 day usage data:  86% of days with > 4 hours of use. 4/30 days with no use.   Average use 343 minutes per day.   95%ile Leak 20.09 L/min.   AHI 0.78 events per hour.     Auto-PAP  -  cmH2O 30 day usage data:    86% of days with > 4 hours of use. 4/30 days with no use.   Average use 343 minutes per day.   95%ile Leak  20.09 L/min.   CPAP 95% pressure 15.3 cm.   AHI 0.78 events per hour.        EPWORTH SLEEPINESS SCALE         5/19/2023    10:31 AM    [place] Sleepiness Scale ( BHUMIKA Iglesias  1990-1997[facility] [place]/English - Final version - 21 Nov 07 - [facility] [place].)   Sitting and reading Slight chance of dozing   Watching TV Slight chance of dozing   Sitting, inactive in a public place (e.g. a theatre or a meeting) Slight chance of dozing   As a passenger in a car for an hour without a break Slight chance of dozing   Lying down to rest in the afternoon when circumstances permit Slight chance of dozing   Sitting and talking to someone Would never doze   Sitting quietly after a lunch without alcohol Would never doze   In a car, while stopped for a few minutes in traffic Slight chance of dozing   [place] Score (MC) 6   [place] Score (Sleep) 6       INSOMNIA SEVERITY INDEX (CAYLA)          5/19/2023    10:30 AM   Insomnia Severity Index (CAYLA)   Difficulty falling asleep 0   Difficulty staying asleep 0   Problems waking up too early 1   How SATISFIED/DISSATISFIED are you with your CURRENT sleep pattern? 0   How NOTICEABLE to others do you think your sleep problem is in terms of impairing the quality of your life? 1   How WORRIED/DISTRESSED are you about your current sleep problem? 0   To what extent do you consider your sleep problem to INTERFERE with your daily functioning (e.g. daytime fatigue, mood, ability to function at work/daily chores, concentration, memory, mood, etc.) CURRENTLY? 1   CAYLA Total Score 3       Guidelines for Scoring/Interpretation:  Total score categories:  0-7 = No clinically significant insomnia   8-14 = Subthreshold insomnia   15-21 = Clinical insomnia (moderate severity)  22-28 = Clinical insomnia (severe)  Used via courtesy of www.[employer].[place].gov with permission from David Blue PhD., [facility]      Past medical/surgical history, family history, social history, medications and allergies were  reviewed.        Problem List:  Patient Active Problem List    Diagnosis Date Noted     Cervical radiculopathy 05/02/2018     Priority: Medium     Cervical pain 03/12/2018     Priority: Medium     Osteoarthritis of spine with radiculopathy, cervical region 03/07/2018     Priority: Medium     Carpal tunnel syndrome of left wrist 03/07/2018     Priority: Medium     Major depressive disorder, recurrent episode, mild (H) 09/23/2016     Priority: Medium     Thalassemia carrier 06/11/2013     Priority: Medium     Hypogonadism male 05/30/2013     Priority: Medium     LUH (obstructive sleep apnea)-very severe () 05/14/2013     Priority: Medium     Indications for Polysomnography 5/8/2013: The patient is a 47 y year old Male who is 6' and weighs 448.1 lbs.  His BMI equals 61.3.  The patients [place] sleepiness scale was 21.0 and neck size was 21.5.  A diagnostic polysomnogram was performed to evaluate for excessive sleepiness, snoring, and possible LUH. After 121.0 minutes of sleep time the patient exhibited sufficient respiratory events qualifying him for a CPAP trial which was then initiated.      Polysomnogram Data:  A full night polysomnogram was performed recording the standard physiologic parameters including EEG, EOG, EMG, EKG, nasal and oral airflow.  Respiratory parameters of chest and abdominal movements are recorded with respiratory inductance plethysmography.  Oxygen saturation was recorded by pulse oximetry.      Diagnostic PSG  Sleep Architecture:  The total recording time of the diagnostic portion of the study was 134.7 minutes.  The total sleep time was 121.0 minutes.  During the diagnostic portion of the study the sleep latency was 0.2 minutes.  REM latency was 116.0 minutes.  Sleep Efficiency was 89.8%.  Wake after sleep onset was 13.0.   The patient spent 21.5% of total sleep time in Stage N1, 69.8% in Stage N2, 2.9% in Stages N3 and 5.8% in REM.         Respiration:     Sustained Sleep Associated  Hypoventilation - was present with a baseline TCM of 45 and a maximum TCM of 54. PaCO2 was 44.    Sleep Associated Hypoxemia - was present and was very severe.  Baseline oxygen saturation was 86.8%.  The lowest oxygen saturation was 32.0%.  Snoring was reported as moderately loud to loud.     Events - During the diagnostic portion of the study, the polysomnogram revealed a presence of 167 obstructive, 1 central, and 1 mixed apneas resulting in an Apnea index of 83.8 events per hour.  There were 38 hypopneas resulting in a Hypopnea index of 18.8 events per hour.  The combined Apnea/Hypopnea Index was 102.6 events per hour.  The REM AHI was 60.0.  The RERA index was 0 per hour.   The RDI was 103.    - 102.6     Treatment PSG  Sleep Architecture:  At 12:24 am the patient was placed on CPAP and then bi-level treatment was titrated.  The total recording time of the treatment portion of the study was 366.2 minutes.  The total sleep time was 350.5 minutes.  During the treatment portion of the study the sleep latency was 0 minutes.  REM latency was 66.0 minutes.  Sleep Efficiency was 95.7%.  Sleep Maintenance Efficiency was 96.0%.  Total wake time was 16.0 minutes for a total wake percentage of 3.8%. the patient spent 6.3% of total sleep time in Stage N1, 20.1% in Stage N2, 40.7% in Stages N3 and N, and 33.0% in REM.       Respiration:  CPAP was titrated from 5 cmH2O to 1 5cmH20 but oxygen desaturations persisted into the 70%s so bi-level PAP was initiated.  Bi-level was titrated to 20/52pwK91 in response to hypopneas and low O2 saturations.  The final pressure was 20.0/14.0 with an AHI of 11.0 all of which were central apneas. Supine REM noted at 18/13-12 cmH2O. AHI was 11 but all were centrals. TCM returned to improved levels (38) with Bi-level.     Movement Activity:      Limb - During the diagnostic portion of the study, there were no limb movements recorded. During the treatment portion of the study, there were 376  "limb movements recorded.  Of this total, 367 were classified as PLMs.  Of the PLMs, none were associated with arousals.  The Limb Movement index was 64.4 per hour while the PLM index was 62.8 per hour.     Behavior - none    Bruxism - none    Seizure - none      CARDIAC SUMMARY:   During the diagnostic portion of the study, the average pulse rate was 84.7 bpm.  The minimum pulse rate was 35.0 bpm while the maximum pulse rate was 124.3 bpm.    During the treatment portion of the study, the average pulse rate was 80.8 bpm.  The minimum pulse rate was 53.8 bpm while the maximum pulse rate was 125.1 bpm.       Assessment:    Very severe LUH () with adequate positive airway pressure titration.    Sleep associated hypoventilation secondary to obesity (OHS)  Recommendations:    Bi-level PAP pressure 18/13 cmH2O with clinical follow-up within 3 - 4 weeks including compliance measures. If pressure too high, or if AHI unacceptably high, proceed to full night CPAP/Bi-level PAP titration study.     Advise regarding the risks of drowsy driving    Suggest optimizing sleep hygiene and avoiding sleep deprivation    Weight management    Dopaminergic therapy should be used for management of restless leg syndrome (RLS) and not based on the presence of periodic limb movements alone.       Sleep related hypoventilation/hypoxemia in other disease 05/14/2013     Priority: Medium     Problem list name updated by automated process. Provider to review       Nocturia 04/18/2013     Priority: Medium     Anemia 04/18/2013     Priority: Medium     Benign essential hypertension 10/18/2012     Priority: Medium     CARDIOVASCULAR SCREENING; LDL GOAL LESS THAN 130 10/31/2010     Priority: Medium     Morbid obesity (H) 05/07/2008     Priority: Medium     Umbilical hernia 08/13/2002     Priority: Medium     Problem list name updated by automated process. Provider to review          BP (!) 150/80   Pulse 100   Resp 18   Ht 1.867 m (6' 1.5\")  "  Wt (!) 174.2 kg (384 lb)   SpO2 97%   BMI 49.98 kg/m

## 2023-06-01 ENCOUNTER — OFFICE VISIT (OUTPATIENT)
Dept: INTERNAL MEDICINE | Facility: CLINIC | Age: 58
End: 2023-06-01
Payer: COMMERCIAL

## 2023-06-01 VITALS
HEIGHT: 73 IN | TEMPERATURE: 97.5 F | WEIGHT: 315 LBS | SYSTOLIC BLOOD PRESSURE: 138 MMHG | OXYGEN SATURATION: 100 % | HEART RATE: 82 BPM | BODY MASS INDEX: 41.75 KG/M2 | DIASTOLIC BLOOD PRESSURE: 72 MMHG

## 2023-06-01 DIAGNOSIS — M17.10 ARTHRITIS OF KNEE: ICD-10-CM

## 2023-06-01 DIAGNOSIS — E78.5 HYPERLIPIDEMIA LDL GOAL <130: ICD-10-CM

## 2023-06-01 DIAGNOSIS — Z12.11 SCREEN FOR COLON CANCER: ICD-10-CM

## 2023-06-01 DIAGNOSIS — Z12.5 SCREENING FOR PROSTATE CANCER: ICD-10-CM

## 2023-06-01 DIAGNOSIS — Z00.00 ROUTINE GENERAL MEDICAL EXAMINATION AT A HEALTH CARE FACILITY: Primary | ICD-10-CM

## 2023-06-01 DIAGNOSIS — F33.0 MAJOR DEPRESSIVE DISORDER, RECURRENT EPISODE, MILD (H): ICD-10-CM

## 2023-06-01 DIAGNOSIS — I10 ESSENTIAL HYPERTENSION WITH GOAL BLOOD PRESSURE LESS THAN 140/90: ICD-10-CM

## 2023-06-01 DIAGNOSIS — E66.01 MORBID OBESITY (H): ICD-10-CM

## 2023-06-01 LAB
ALBUMIN SERPL BCG-MCNC: 4.3 G/DL (ref 3.5–5.2)
ALBUMIN UR-MCNC: NEGATIVE MG/DL
ALP SERPL-CCNC: 91 U/L (ref 40–129)
ALT SERPL W P-5'-P-CCNC: 29 U/L (ref 10–50)
ANION GAP SERPL CALCULATED.3IONS-SCNC: 7 MMOL/L (ref 7–15)
APPEARANCE UR: CLEAR
AST SERPL W P-5'-P-CCNC: 23 U/L (ref 10–50)
BILIRUB SERPL-MCNC: 0.5 MG/DL
BILIRUB UR QL STRIP: NEGATIVE
BUN SERPL-MCNC: 12.2 MG/DL (ref 8–23)
CALCIUM SERPL-MCNC: 9.5 MG/DL (ref 8.6–10)
CHLORIDE SERPL-SCNC: 106 MMOL/L (ref 98–107)
CHOLEST SERPL-MCNC: 116 MG/DL
COLOR UR AUTO: YELLOW
CREAT SERPL-MCNC: 0.74 MG/DL (ref 0.67–1.17)
DEPRECATED HCO3 PLAS-SCNC: 28 MMOL/L (ref 22–29)
ERYTHROCYTE [DISTWIDTH] IN BLOOD BY AUTOMATED COUNT: 18 % (ref 10–15)
GFR SERPL CREATININE-BSD FRML MDRD: >90 ML/MIN/1.73M2
GLUCOSE SERPL-MCNC: 120 MG/DL (ref 70–99)
GLUCOSE UR STRIP-MCNC: NEGATIVE MG/DL
HCT VFR BLD AUTO: 40.2 % (ref 40–53)
HDLC SERPL-MCNC: 43 MG/DL
HGB BLD-MCNC: 12.3 G/DL (ref 13.3–17.7)
HGB UR QL STRIP: NEGATIVE
KETONES UR STRIP-MCNC: NEGATIVE MG/DL
LDLC SERPL CALC-MCNC: 59 MG/DL
LEUKOCYTE ESTERASE UR QL STRIP: NEGATIVE
MCH RBC QN AUTO: 20.1 PG (ref 26.5–33)
MCHC RBC AUTO-ENTMCNC: 30.6 G/DL (ref 31.5–36.5)
MCV RBC AUTO: 66 FL (ref 78–100)
NITRATE UR QL: NEGATIVE
NONHDLC SERPL-MCNC: 73 MG/DL
PH UR STRIP: 6 [PH] (ref 5–7)
PLATELET # BLD AUTO: 213 10E3/UL (ref 150–450)
POTASSIUM SERPL-SCNC: 4.1 MMOL/L (ref 3.4–5.3)
PROT SERPL-MCNC: 6.8 G/DL (ref 6.4–8.3)
PSA SERPL DL<=0.01 NG/ML-MCNC: 2.29 NG/ML (ref 0–3.5)
RBC # BLD AUTO: 6.12 10E6/UL (ref 4.4–5.9)
SODIUM SERPL-SCNC: 141 MMOL/L (ref 136–145)
SP GR UR STRIP: 1.02 (ref 1–1.03)
TRIGL SERPL-MCNC: 69 MG/DL
UROBILINOGEN UR STRIP-MCNC: 2 MG/DL
WBC # BLD AUTO: 6.4 10E3/UL (ref 4–11)

## 2023-06-01 PROCEDURE — 99213 OFFICE O/P EST LOW 20 MIN: CPT | Mod: 25 | Performed by: INTERNAL MEDICINE

## 2023-06-01 PROCEDURE — 80053 COMPREHEN METABOLIC PANEL: CPT | Performed by: INTERNAL MEDICINE

## 2023-06-01 PROCEDURE — 99396 PREV VISIT EST AGE 40-64: CPT | Performed by: INTERNAL MEDICINE

## 2023-06-01 PROCEDURE — G0103 PSA SCREENING: HCPCS | Performed by: INTERNAL MEDICINE

## 2023-06-01 PROCEDURE — 85027 COMPLETE CBC AUTOMATED: CPT | Performed by: INTERNAL MEDICINE

## 2023-06-01 PROCEDURE — 81003 URINALYSIS AUTO W/O SCOPE: CPT | Performed by: INTERNAL MEDICINE

## 2023-06-01 PROCEDURE — 36415 COLL VENOUS BLD VENIPUNCTURE: CPT | Performed by: INTERNAL MEDICINE

## 2023-06-01 PROCEDURE — 80061 LIPID PANEL: CPT | Performed by: INTERNAL MEDICINE

## 2023-06-01 RX ORDER — LISINOPRIL AND HYDROCHLOROTHIAZIDE 20; 25 MG/1; MG/1
1 TABLET ORAL DAILY
Qty: 90 TABLET | Refills: 3 | Status: SHIPPED | OUTPATIENT
Start: 2023-06-01 | End: 2024-07-22

## 2023-06-01 RX ORDER — MELOXICAM 15 MG/1
15 TABLET ORAL DAILY
Qty: 90 TABLET | Refills: 3 | Status: SHIPPED | OUTPATIENT
Start: 2023-06-01 | End: 2024-07-31

## 2023-06-01 ASSESSMENT — ENCOUNTER SYMPTOMS
WEAKNESS: 0
FREQUENCY: 0
CONSTIPATION: 0
DIZZINESS: 0
DYSURIA: 0
SHORTNESS OF BREATH: 0
EYE PAIN: 0
PALPITATIONS: 0
NERVOUS/ANXIOUS: 0
HEMATURIA: 0
HEARTBURN: 0
PARESTHESIAS: 0
ABDOMINAL PAIN: 0
NAUSEA: 0
MYALGIAS: 0
COUGH: 0
HEADACHES: 0
HEMATOCHEZIA: 0
JOINT SWELLING: 1
ARTHRALGIAS: 1
CHILLS: 0
DIARRHEA: 0
FEVER: 0
SORE THROAT: 0

## 2023-06-01 ASSESSMENT — PATIENT HEALTH QUESTIONNAIRE - PHQ9
SUM OF ALL RESPONSES TO PHQ QUESTIONS 1-9: 0
10. IF YOU CHECKED OFF ANY PROBLEMS, HOW DIFFICULT HAVE THESE PROBLEMS MADE IT FOR YOU TO DO YOUR WORK, TAKE CARE OF THINGS AT HOME, OR GET ALONG WITH OTHER PEOPLE: NOT DIFFICULT AT ALL
SUM OF ALL RESPONSES TO PHQ QUESTIONS 1-9: 0

## 2023-06-01 NOTE — PROGRESS NOTES
SUBJECTIVE:   CC: FERNANDA is an 57 year old who presents for preventative health visit.       6/1/2023     6:59 AM   Additional Questions   Roomed by Johanna LOW MA     Healthy Habits:     Getting at least 3 servings of Calcium per day:  Yes    Bi-annual eye exam:  Yes    Dental care twice a year:  Yes    Sleep apnea or symptoms of sleep apnea:  Sleep apnea    Diet:  Regular (no restrictions)    Frequency of exercise:  None    Taking medications regularly:  Yes    Medication side effects:  Not applicable    PHQ-2 Total Score: 0    Additional concerns today:  No              -------------------------------------        Social History     Tobacco Use     Smoking status: Every Day     Packs/day: 0.01     Years: 10.00     Pack years: 0.10     Types: Cigars, Cigarettes     Smokeless tobacco: Never     Tobacco comments:      cigars 2/ day   Vaping Use     Vaping status: Never Used   Substance Use Topics     Alcohol use: Yes     Alcohol/week: 0.0 standard drinks of alcohol     Comment: 1 a week              6/1/2023     7:00 AM   Alcohol Use   Prescreen: >3 drinks/day or >7 drinks/week? No       Last PSA:   PSA   Date Value Ref Range Status   04/06/2021 2.03 0 - 4 ug/L Final     Comment:     Assay Method:  Chemiluminescence using Siemens Vista analyzer     Prostate Specific Antigen Screen   Date Value Ref Range Status   05/02/2022 2.23 0.00 - 4.00 ug/L Final       Reviewed orders with patient. Reviewed health maintenance and updated orders accordingly - Yes  Lab work is in process  BP Readings from Last 3 Encounters:   06/01/23 138/72   05/19/23 (!) 150/80   10/14/22 134/78    Wt Readings from Last 3 Encounters:   06/01/23 (!) 171.5 kg (378 lb 2 oz)   05/19/23 (!) 174.2 kg (384 lb)   10/14/22 (!) 173.3 kg (382 lb)                  Patient Active Problem List   Diagnosis     Umbilical hernia     Morbid obesity (H)     CARDIOVASCULAR SCREENING; LDL GOAL LESS THAN 130     Benign essential hypertension     Nocturia     Anemia      LUH (obstructive sleep apnea)-very severe ()     Sleep related hypoventilation/hypoxemia in other disease     Hypogonadism male     Thalassemia carrier     Major depressive disorder, recurrent episode, mild (H)     Osteoarthritis of spine with radiculopathy, cervical region     Carpal tunnel syndrome of left wrist     Cervical pain     Cervical radiculopathy     Past Surgical History:   Procedure Laterality Date     COLONOSCOPY  9/20/2013    Procedure: COLONOSCOPY;  Colonoscopy;  Surgeon: Avery Morales MD;  Location:  GI     HC EXCISE EXCESS SKIN TISSUE,OTHER      Hx; staph complications of hernia surg     HC KNEE SCOPE, DIAGNOSTIC  1999,2000    Arthroscopy, Knee     HC REMOVAL OF TONSILS,<11 Y/O  1973    Tonsils <10y.o.     HERNIA REPAIR, INCISIONAL  10/09/08    Ventral herniorrhaphy/mesh placement.  Excision left elbow mass.     INJECT EPIDURAL CERVICAL N/A 4/12/2018    Procedure: INJECT EPIDURAL CERVICAL;  translaminar epidural injection at cervical 7 - throacic 1;  Surgeon: Phong Andrade MD;  Location:  OR     [facility] AFF EYE MUSCLE SURG PROC UNLISTED  1970,1974     [facility] ANESTH,REPAIR LO ABD HERNIA NOS  2000,2001    umbilical area     ZZ REPAIR INITIAL INCISIONAL HERNIA; INCARCERATED OR STRANGULATED  09/25/2002    Mesh repair of incarcerated incisional hernia, recurrent.       Social History     Tobacco Use     Smoking status: Every Day     Packs/day: 0.01     Years: 10.00     Pack years: 0.10     Types: Cigars, Cigarettes     Smokeless tobacco: Never     Tobacco comments:      cigars 2/ day   Vaping Use     Vaping status: Never Used   Substance Use Topics     Alcohol use: Yes     Alcohol/week: 0.0 standard drinks of alcohol     Comment: 1 a week      Family History   Problem Relation Age of Onset     Heart Disease Paternal Grandfather         heart attack     Cerebrovascular Disease Paternal Grandfather      Alzheimer Disease Paternal Grandfather      Heart Disease Maternal Grandmother          heart attack     Diabetes Maternal Grandmother         adult onset     Alzheimer Disease Father         snores     Alzheimer Disease Paternal Grandmother      Hypertension Mother      Diabetes Mother          Current Outpatient Medications   Medication Sig Dispense Refill     calcium carb 1250 mg, 500 mg [place],/vitamin D 200 units (OSCAL WITH D) 500-200 MG-UNIT per tablet Take 600mg daily 1 tablet      Calcium-Magnesium 300-300 MG TABS Take 300 mg by mouth daily       lisinopril-hydrochlorothiazide (ZESTORETIC) 20-25 MG tablet Take 1 tablet by mouth daily 90 tablet 3     Magnesium 400 MG TABS Take 400 mg % by mouth 2 times daily       meloxicam (MOBIC) 15 MG tablet Take 1 tablet (15 mg) by mouth daily 90 tablet 3     order for DME Dispensed 1 Dorsal (Anterior) Night Splint, Size L/XL, with [facility] agreement signed by patient. Angy Garner CMA, November 30, 2015 1 Device 0     Vitamin D, Cholecalciferol, 400 UNITS TABS Take 400 Units by mouth daily       Allergies   Allergen Reactions     No Known Drug Allergy        Reviewed and updated as needed this visit by clinical staff   Tobacco  Allergies  Meds              Reviewed and updated as needed this visit by Provider                 Past Medical History:   Diagnosis Date     HTN (hypertension) 2012     Morbid obesity with BMI of 60.0-69.9, adult (H)      LUH (obstructive sleep apnea) 5/2013    Severe       Past Surgical History:   Procedure Laterality Date     COLONOSCOPY  9/20/2013    Procedure: COLONOSCOPY;  Colonoscopy;  Surgeon: Avery Morales MD;  Location: [facility]     [facility] EXCISE EXCESS SKIN TISSUE,OTHER      Hx; staph complications of hernia surg     HC KNEE SCOPE, DIAGNOSTIC  1999,2000    Arthroscopy, Knee     HC REMOVAL OF TONSILS,<11 Y/O  1973    Tonsils <10y.o.     HERNIA REPAIR, INCISIONAL  10/09/08    Ventral herniorrhaphy/mesh placement.  Excision left elbow mass.     INJECT EPIDURAL CERVICAL N/A 4/12/2018    Procedure: INJECT EPIDURAL  "CERVICAL;  translaminar epidural injection at cervical 7 - throacic 1;  Surgeon: Phong Andrade MD;  Location:  OR     [facility] AFF EYE MUSCLE SURG PROC UNLISTED  1970,1974     [facility] ANESTH,REPAIR LO ABD HERNIA NOS  2000,2001    umbilical area     [facility] REPAIR INITIAL INCISIONAL HERNIA; INCARCERATED OR STRANGULATED  09/25/2002    Mesh repair of incarcerated incisional hernia, recurrent.       Review of Systems   Constitutional: Negative for chills and fever.   HENT: Negative for congestion, ear pain, hearing loss and sore throat.    Eyes: Negative for pain and visual disturbance.   Respiratory: Negative for cough and shortness of breath.    Cardiovascular: Positive for peripheral edema. Negative for chest pain and palpitations.   Gastrointestinal: Negative for abdominal pain, constipation, diarrhea, heartburn, hematochezia and nausea.   Genitourinary: Positive for impotence. Negative for dysuria, frequency, genital sores, hematuria, penile discharge and urgency.   Musculoskeletal: Positive for arthralgias and joint swelling. Negative for myalgias.   Skin: Negative for rash.   Neurological: Negative for dizziness, weakness, headaches and paresthesias.   Psychiatric/Behavioral: Negative for mood changes. The patient is not nervous/anxious.      Patient has occasional peripheral edema associated with his obesity.  It generally resolves by morning.  Patient has significant crepitance of both knees as a result of degenerative joint disease and medial compartment arthritis.  Patient has intermittent erectile dysfunction.    OBJECTIVE:   /72   Pulse 82   Temp 97.5  F (36.4  C) (Temporal)   Ht 1.842 m (6' 0.5\")   Wt (!) 171.5 kg (378 lb 2 oz)   SpO2 100%   BMI 50.58 kg/m      Physical Exam  GENERAL: healthy, alert and no distress  EYES: Eyes grossly normal to inspection, PERRL and conjunctivae and sclerae normal  HENT: ear canals and TM's normal, nose and mouth without ulcers or lesions  NECK: no adenopathy, no " asymmetry, masses, or scars and thyroid normal to palpation  RESP: lungs clear to auscultation - no rales, rhonchi or wheezes  CV: regular rate and rhythm, normal S1 S2, no S3 or S4, no murmur, click or rub, no peripheral edema and peripheral pulses strong  ABDOMEN: soft, nontender, no hepatosplenomegaly, no masses and bowel sounds normal.  Abdomen is morbidly obese.  MS: no gross musculoskeletal defects noted, no edema  SKIN: no suspicious lesions or rashes.  Significant crepitance bilateral knees noted.  NEURO: Normal strength and tone, mentation intact and speech normal  PSYCH: mentation appears normal, affect normal/bright    Diagnostic Test Results:  Results for orders placed or performed in visit on 06/01/23 (from the past 24 hour(s))   CBC with platelets   Result Value Ref Range    WBC Count 6.4 4.0 - 11.0 10e3/uL    RBC Count 6.12 (H) 4.40 - 5.90 10e6/uL    Hemoglobin 12.3 (L) 13.3 - 17.7 g/dL    Hematocrit 40.2 40.0 - 53.0 %    MCV 66 (L) 78 - 100 fL    MCH 20.1 (L) 26.5 - 33.0 pg    MCHC 30.6 (L) 31.5 - 36.5 g/dL    RDW 18.0 (H) 10.0 - 15.0 %    Platelet Count 213 150 - 450 10e3/uL       ASSESSMENT/PLAN:       ICD-10-CM    1. Routine general medical examination at a health care facility  Z00.00       2. Essential hypertension with goal blood pressure less than 140/90  I10 CBC with platelets     Comprehensive metabolic panel (BMP + Alb, Alk Phos, ALT, AST, Total. Bili, TP)     UA Macroscopic with reflex to Microscopic and Culture     lisinopril-hydrochlorothiazide (ZESTORETIC) 20-25 MG tablet     CBC with platelets     Comprehensive metabolic panel (BMP + Alb, Alk Phos, ALT, AST, Total. Bili, TP)     UA Macroscopic with reflex to Microscopic and Culture      3. Hyperlipidemia LDL goal <130  E78.5 Lipid panel reflex to direct LDL Fasting     Lipid panel reflex to direct LDL Fasting      4. Arthritis of knee  M17.10 meloxicam (MOBIC) 15 MG tablet      5. Morbid obesity (H)  E66.01       6. Major depressive  disorder, recurrent episode, mild (H)  F33.0       7. Screening for prostate cancer  Z12.5 PSA, screen     PSA, screen      8. Screen for colon cancer  Z12.11 Colonoscopy Screening  Referral          Patient has been advised of split billing requirements and indicates understanding: Yes      COUNSELING:   Reviewed preventive health counseling, as reflected in patient instructions       Regular exercise       Healthy diet/nutrition       Vision screening       Hearing screening       Colorectal cancer screening       Prostate cancer screening        He reports that he has been smoking cigars and cigarettes. He has a 0.10 pack-year smoking history. He has never used smokeless tobacco.  Nicotine/Tobacco Cessation Plan:   Information offered: Patient not interested at this time          I have reviewed the patient's vaccination schedule and discussed the benefits of prophylactic vaccination in detail.  I recommend the patient contact their pharmacist for vaccinations.  Discussed that most insurance companies now favor reimbursement to the pharmacies and it will financially behoove the patient to have vaccinations performed at their pharmacy.      Edd Resendiz, [facility]  Answers for HPI/ROS submitted by the patient on 6/1/2023  If you checked off any problems, how difficult have these problems made it for you to do your work, take care of things at home, or get along with other people?: Not difficult at all  PHQ9 TOTAL SCORE: 0

## 2023-06-13 ENCOUNTER — TELEPHONE (OUTPATIENT)
Dept: GASTROENTEROLOGY | Facility: CLINIC | Age: 58
End: 2023-06-13
Payer: COMMERCIAL

## 2023-06-13 NOTE — TELEPHONE ENCOUNTER
"Endoscopy Scheduling Screen    Have you had a positive Covid test in the last 14 days?  No    Are you active on MyChart?   Yes    What insurance is in the chart?  Other:  [employer]    Ordering/Referring Provider: WAYLON   (If ordering provider performs procedure, schedule with ordering provider unless otherwise instructed. )    BMI: Estimated body mass index is 50.58 kg/m  as calculated from the following:    Height as of 6/1/23: 1.842 m (6' 0.5\").    Weight as of 6/1/23: 171.5 kg (378 lb 2 oz).     Sedation Ordered  moderate sedation.   If patient BMI > 50 do not schedule in ASC.    Are you taking any prescription medications for pain?   Yes (RN Review required for scheduling unless scheduling in Hospital.)    Are you taking methadone or Suboxone?  No    Do you have a history of malignant hyperthermia or adverse reaction to anesthesia?  No    (Females) Are you currently pregnant?   No     Have you been diagnosed or told you have pulmonary hypertension?   No    Do you have an LVAD?  No    Have you been told you have moderate to severe sleep apnea?  Yes (RN Review required for scheduling unless scheduling in Hospital.)    Have you been told you have COPD, asthma, or any other lung disease?  No    Do you have any heart conditions?  No     Have you ever had or are you awaiting a heart or lung transplant?   No    Have you had a stroke or transient ischemic attack (TIA aka \"mini stroke\" in the last 6 months?   No    Have you been diagnosed with or been told you have cirrhosis of the liver?   No    Are you currently on dialysis?   No    Do you need assistance transferring?   No    BMI: Estimated body mass index is 50.58 kg/m  as calculated from the following:    Height as of 6/1/23: 1.842 m (6' 0.5\").    Weight as of 6/1/23: 171.5 kg (378 lb 2 oz).     Is patients BMI > 40 and scheduling location [facility]?  No    Do you take the medication Phentermine, Ozempic or Wegovy?  No    Do you take the medication " Naltrexone?  No    Do you take blood thinners?  No    Preferred Pharmacy:    [facility] [place], [place] - [address] [place] [address]  Phone: [phone] Fax: [phone]    [facility] [place], [place] - [address], [address]  [address], [address]  [facility] [address]  Phone: [phone] Fax: [phone]      Prep   Are you currently on dialysis or do you have chronic kidney disease?  No (standard prep)    Do you have a diagnosis of diabetes?  No    Do you have a diagnosis of cystic fibrosis (CF)?  No    On a regular basis do you go 3 -5 days between bowel movements?  No    BMI > 40?  Yes (Extended Prep)    Final Scheduling Details   Colonoscopy prep sent?  Golytely Extended Prep    Procedure scheduled  COLONOSCOPY    Surgeon:  MADONNA     Date of procedure:  8/2     Schedule PAC:   No    Location  [facility]    Sedation   MAC/Deep Sedation    Patient Reminders:    You will receive a call from a Nurse to review instructions and health history.  This assessment must be completed prior to your procedure.  Failure to complete the Nurse assessment may result in the procedure being cancelled.       On the day of your procedure, please designate an adult(s) who can drive you home stay with you for the next 24 hours. The medicines used in the exam will make you sleepy. You will not be able to drive.       You cannot take public transportation, ride share services, or non-medical taxi service without a responsible caregiver.  Medical transport services are allowed with the requirement that a responsible caregiver will receive you at your destination.  We require that drivers and caregivers are confirmed prior to your procedure.

## 2023-08-01 NOTE — H&P
[facility] Anesthesia Pre-op History and Physical    Sharif Giraldo MRN# [id number]   Age: 57 year old YOB: 1965      Date of Surgery: 8/2/2023 Location [facility]      Date of Exam 8/2/2023 Facility (In hospital)       Home clinic: [facility]  Primary care provider: No Ref-Primary, Physician         Chief Complaint and/or Reason for Procedure:   No chief complaint on file.    Colonoscopy. Exam 2013       Active problem list:     Patient Active Problem List    Diagnosis Date Noted    Cervical radiculopathy 05/02/2018     Priority: Medium    Cervical pain 03/12/2018     Priority: Medium    Osteoarthritis of spine with radiculopathy, cervical region 03/07/2018     Priority: Medium    Carpal tunnel syndrome of left wrist 03/07/2018     Priority: Medium    Major depressive disorder, recurrent episode, mild (H) 09/23/2016     Priority: Medium    Thalassemia carrier 06/11/2013     Priority: Medium    Hypogonadism male 05/30/2013     Priority: Medium    LUH (obstructive sleep apnea)-very severe () 05/14/2013     Priority: Medium     Indications for Polysomnography 5/8/2013: The patient is a 47 y year old Male who is 6' and weighs 448.1 lbs.  His BMI equals 61.3.  The patients Wink sleepiness scale was 21.0 and neck size was 21.5.  A diagnostic polysomnogram was performed to evaluate for excessive sleepiness, snoring, and possible LUH. After 121.0 minutes of sleep time the patient exhibited sufficient respiratory events qualifying him for a CPAP trial which was then initiated.      Polysomnogram Data:  A full night polysomnogram was performed recording the standard physiologic parameters including EEG, EOG, EMG, EKG, nasal and oral airflow.  Respiratory parameters of chest and abdominal movements are recorded with respiratory inductance plethysmography.  Oxygen saturation was recorded by pulse oximetry.      Diagnostic PSG  Sleep  Architecture:  The total recording time of the diagnostic portion of the study was 134.7 minutes.  The total sleep time was 121.0 minutes.  During the diagnostic portion of the study the sleep latency was 0.2 minutes.  REM latency was 116.0 minutes.  Sleep Efficiency was 89.8%.  Wake after sleep onset was 13.0.   The patient spent 21.5% of total sleep time in Stage N1, 69.8% in Stage N2, 2.9% in Stages N3 and 5.8% in REM.         Respiration:   Sustained Sleep Associated Hypoventilation - was present with a baseline TCM of 45 and a maximum TCM of 54. PaCO2 was 44.  Sleep Associated Hypoxemia - was present and was very severe.  Baseline oxygen saturation was 86.8%.  The lowest oxygen saturation was 32.0%.  Snoring was reported as moderately loud to loud.   Events - During the diagnostic portion of the study, the polysomnogram revealed a presence of 167 obstructive, 1 central, and 1 mixed apneas resulting in an Apnea index of 83.8 events per hour.  There were 38 hypopneas resulting in a Hypopnea index of 18.8 events per hour.  The combined Apnea/Hypopnea Index was 102.6 events per hour.  The REM AHI was 60.0.  The RERA index was 0 per hour.   The RDI was 103.    - 102.6     Treatment PSG  Sleep Architecture:  At 12:24 am the patient was placed on CPAP and then bi-level treatment was titrated.  The total recording time of the treatment portion of the study was 366.2 minutes.  The total sleep time was 350.5 minutes.  During the treatment portion of the study the sleep latency was 0 minutes.  REM latency was 66.0 minutes.  Sleep Efficiency was 95.7%.  Sleep Maintenance Efficiency was 96.0%.  Total wake time was 16.0 minutes for a total wake percentage of 3.8%. the patient spent 6.3% of total sleep time in Stage N1, 20.1% in Stage N2, 40.7% in Stages N3 and N, and 33.0% in REM.       Respiration:  CPAP was titrated from 5 cmH2O to 1 5cmH20 but oxygen desaturations persisted into the 70%s so bi-level PAP was initiated.   Bi-level was titrated to 20/65bxZ83 in response to hypopneas and low O2 saturations.  The final pressure was 20.0/14.0 with an AHI of 11.0 all of which were central apneas. Supine REM noted at 18/13-12 cmH2O. AHI was 11 but all were centrals. TCM returned to improved levels (38) with Bi-level.     Movement Activity:    Limb - During the diagnostic portion of the study, there were no limb movements recorded. During the treatment portion of the study, there were 376 limb movements recorded.  Of this total, 367 were classified as PLMs.  Of the PLMs, none were associated with arousals.  The Limb Movement index was 64.4 per hour while the PLM index was 62.8 per hour.   Behavior - none  Bruxism - none  Seizure - none      CARDIAC SUMMARY:   During the diagnostic portion of the study, the average pulse rate was 84.7 bpm.  The minimum pulse rate was 35.0 bpm while the maximum pulse rate was 124.3 bpm.    During the treatment portion of the study, the average pulse rate was 80.8 bpm.  The minimum pulse rate was 53.8 bpm while the maximum pulse rate was 125.1 bpm.       Assessment:  Very severe LUH () with adequate positive airway pressure titration.  Sleep associated hypoventilation secondary to obesity (OHS)  Recommendations:  Bi-level PAP pressure 18/13 cmH2O with clinical follow-up within 3 - 4 weeks including compliance measures. If pressure too high, or if AHI unacceptably high, proceed to full night CPAP/Bi-level PAP titration study.   Advise regarding the risks of drowsy driving  Suggest optimizing sleep hygiene and avoiding sleep deprivation  Weight management  Dopaminergic therapy should be used for management of restless leg syndrome (RLS) and not based on the presence of periodic limb movements alone.      Sleep related hypoventilation/hypoxemia in other disease 05/14/2013     Priority: Medium     Problem list name updated by automated process. Provider to review      Nocturia 04/18/2013     Priority:  Medium    Anemia 04/18/2013     Priority: Medium    Benign essential hypertension 10/18/2012     Priority: Medium    CARDIOVASCULAR SCREENING; LDL GOAL LESS THAN 130 10/31/2010     Priority: Medium    Morbid obesity (H) 05/07/2008     Priority: Medium    Umbilical hernia 08/13/2002     Priority: Medium     Problem list name updated by automated process. Provider to review              Medications (include herbals and vitamins):   Any Plavix use in the last 7 days? No     No current facility-administered medications for this encounter.     Current Outpatient Medications   Medication Sig    bisacodyl (DULCOLAX) 5 MG EC tablet 2 days prior to procedure, take 2 tablets at 4 pm. 1 day prior to procedure, take 2 tablets at 4 pm. For additional instructions refer to your colonoscopy prep instructions.    calcium carb 1250 mg, 500 mg [place],/vitamin D 200 units (OSCAL WITH D) 500-200 MG-UNIT per tablet Take 600mg daily    Calcium-Magnesium 300-300 MG TABS Take 300 mg by mouth daily    lisinopril-hydrochlorothiazide (ZESTORETIC) 20-25 MG tablet Take 1 tablet by mouth daily    Magnesium 400 MG TABS Take 400 mg % by mouth 2 times daily    meloxicam (MOBIC) 15 MG tablet Take 1 tablet (15 mg) by mouth daily    order for DME Dispensed 1 Dorsal (Anterior) Night Splint, Size L/XL, with [facility] agreement signed by patient. Angy Garner [facility], November 30, 2015    polyethylene glycol (GOLYTELY) 236 g suspension 2 days prior at 5pm, mix and drink half of a jug of Golytely. Drink an 8 oz. glass of Golytely every 15 minutes until half of the jug is gone. Place remainder of Golytely in the refrigerator. 1 day prior at 5 pm, drink the 2nd half of a jug of Golytely bowel prep. 6 hours before your check-in time, drink an 8 oz. glass of Golytely every 15 minutes until half of the 2nd jug of Golytely is gone. Discard remainder of second jug.    Vitamin D, Cholecalciferol, 400 UNITS TABS Take 400 Units by mouth daily             Allergies:       Allergies   Allergen Reactions    No Known Drug Allergy      Allergy to Latex? No  Allergy to tape?   No  Intolerances:             Physical Exam:   All vitals have been reviewed  No data found.  No intake/output data recorded.  Lungs:   No increased work of breathing, good air exchange, clear to auscultation bilaterally, no crackles or wheezing     Cardiovascular:   Normal apical impulse, regular rate and rhythm, normal S1 and S2, no S3 or S4, and no murmur noted             Lab / Radiology Results:            Anesthetic risk and/or ASA classification:       Tr Gonzalez MD

## 2023-08-02 ENCOUNTER — ANESTHESIA EVENT (OUTPATIENT)
Dept: GASTROENTEROLOGY | Facility: CLINIC | Age: 58
End: 2023-08-02
Payer: COMMERCIAL

## 2023-08-02 ENCOUNTER — HOSPITAL ENCOUNTER (OUTPATIENT)
Facility: CLINIC | Age: 58
Discharge: HOME | End: 2023-08-02
Attending: INTERNAL MEDICINE | Admitting: INTERNAL MEDICINE
Payer: COMMERCIAL

## 2023-08-02 ENCOUNTER — ANESTHESIA (OUTPATIENT)
Dept: GASTROENTEROLOGY | Facility: CLINIC | Age: 58
End: 2023-08-02
Payer: COMMERCIAL

## 2023-08-02 VITALS
SYSTOLIC BLOOD PRESSURE: 151 MMHG | OXYGEN SATURATION: 96 % | RESPIRATION RATE: 18 BRPM | HEART RATE: 78 BPM | DIASTOLIC BLOOD PRESSURE: 86 MMHG | TEMPERATURE: 98.3 F

## 2023-08-02 LAB — COLONOSCOPY: NORMAL

## 2023-08-02 PROCEDURE — 370N000017 HC ANESTHESIA TECHNICAL FEE, PER MIN: Performed by: INTERNAL MEDICINE

## 2023-08-02 PROCEDURE — 250N000009 HC RX 250: Performed by: NURSE ANESTHETIST, CERTIFIED REGISTERED

## 2023-08-02 PROCEDURE — 250N000011 HC RX IP 250 OP 636: Performed by: NURSE ANESTHETIST, CERTIFIED REGISTERED

## 2023-08-02 PROCEDURE — 258N000003 HC RX IP 258 OP 636: Performed by: NURSE ANESTHETIST, CERTIFIED REGISTERED

## 2023-08-02 PROCEDURE — 45380 COLONOSCOPY AND BIOPSY: CPT | Mod: PT | Performed by: INTERNAL MEDICINE

## 2023-08-02 PROCEDURE — 88305 TISSUE EXAM BY PATHOLOGIST: CPT | Mod: TC | Performed by: INTERNAL MEDICINE

## 2023-08-02 PROCEDURE — 88305 TISSUE EXAM BY PATHOLOGIST: CPT | Mod: 26 | Performed by: PATHOLOGY

## 2023-08-02 RX ORDER — LIDOCAINE HYDROCHLORIDE 20 MG/ML
INJECTION, SOLUTION INFILTRATION; PERINEURAL PRN
Status: DISCONTINUED | OUTPATIENT
Start: 2023-08-02 | End: 2023-08-02

## 2023-08-02 RX ORDER — PROPOFOL 10 MG/ML
INJECTION, EMULSION INTRAVENOUS CONTINUOUS PRN
Status: DISCONTINUED | OUTPATIENT
Start: 2023-08-02 | End: 2023-08-02

## 2023-08-02 RX ORDER — ONDANSETRON 2 MG/ML
4 INJECTION INTRAMUSCULAR; INTRAVENOUS EVERY 30 MIN PRN
Status: DISCONTINUED | OUTPATIENT
Start: 2023-08-02 | End: 2023-08-02 | Stop reason: HOSPADM

## 2023-08-02 RX ORDER — PROPOFOL 10 MG/ML
INJECTION, EMULSION INTRAVENOUS PRN
Status: DISCONTINUED | OUTPATIENT
Start: 2023-08-02 | End: 2023-08-02

## 2023-08-02 RX ORDER — SODIUM CHLORIDE, SODIUM LACTATE, POTASSIUM CHLORIDE, CALCIUM CHLORIDE 600; 310; 30; 20 MG/100ML; MG/100ML; MG/100ML; MG/100ML
INJECTION, SOLUTION INTRAVENOUS CONTINUOUS
Status: DISCONTINUED | OUTPATIENT
Start: 2023-08-02 | End: 2023-08-02 | Stop reason: HOSPADM

## 2023-08-02 RX ORDER — ONDANSETRON 4 MG/1
4 TABLET, ORALLY DISINTEGRATING ORAL EVERY 30 MIN PRN
Status: DISCONTINUED | OUTPATIENT
Start: 2023-08-02 | End: 2023-08-02 | Stop reason: HOSPADM

## 2023-08-02 RX ADMIN — SODIUM CHLORIDE, POTASSIUM CHLORIDE, SODIUM LACTATE AND CALCIUM CHLORIDE: 600; 310; 30; 20 INJECTION, SOLUTION INTRAVENOUS at 09:55

## 2023-08-02 RX ADMIN — PROPOFOL 70 MG: 10 INJECTION, EMULSION INTRAVENOUS at 10:04

## 2023-08-02 RX ADMIN — LIDOCAINE HYDROCHLORIDE 50 MG: 20 INJECTION, SOLUTION INFILTRATION; PERINEURAL at 10:04

## 2023-08-02 RX ADMIN — PROPOFOL 200 MCG/KG/MIN: 10 INJECTION, EMULSION INTRAVENOUS at 10:04

## 2023-08-02 RX ADMIN — LIDOCAINE HYDROCHLORIDE 1 ML: 10 INJECTION, SOLUTION EPIDURAL; INFILTRATION; INTRACAUDAL; PERINEURAL at 09:55

## 2023-08-02 ASSESSMENT — LIFESTYLE VARIABLES: TOBACCO_USE: 1

## 2023-08-02 NOTE — ANESTHESIA PREPROCEDURE EVALUATION
Anesthesia Pre-Procedure Evaluation    Patient: Sharif Giraldo   MRN: [id number] : 1965        Procedure : Procedure(s):  Colonoscopy          Past Medical History:   Diagnosis Date    HTN (hypertension)     Morbid obesity with BMI of 60.0-69.9, adult (H)     LUH (obstructive sleep apnea) 2013    Severe       Past Surgical History:   Procedure Laterality Date    COLONOSCOPY  2013    Procedure: COLONOSCOPY;  Colonoscopy;  Surgeon: Avery Morales MD;  Location:  GI    HC EXCISE EXCESS SKIN TISSUE,OTHER      Hx; staph complications of hernia surg    HC KNEE SCOPE, DIAGNOSTIC  ,    Arthroscopy, Knee    HC REMOVAL OF TONSILS,<11 Y/O      Tonsils <10y.o.    HERNIA REPAIR, INCISIONAL  10/09/08    Ventral herniorrhaphy/mesh placement.  Excision left elbow mass.    INJECT EPIDURAL CERVICAL N/A 2018    Procedure: INJECT EPIDURAL CERVICAL;  translaminar epidural injection at cervical 7 - throacic 1;  Surgeon: Phong Andrade MD;  Location:  OR    [facility] AFF EYE MUSCLE SURG PROC UNLISTED  ,    Z ANESTH,REPAIR LO ABD HERNIA NOS  ,    umbilical area    ZZ REPAIR INITIAL INCISIONAL HERNIA; INCARCERATED OR STRANGULATED  2002    Mesh repair of incarcerated incisional hernia, recurrent.      Allergies   Allergen Reactions    No Known Drug Allergy       Social History     Tobacco Use    Smoking status: Every Day     Packs/day: 0.01     Years: 10.00     Pack years: 0.10     Types: Cigars, Cigarettes    Smokeless tobacco: Never    Tobacco comments:      cigars 2/ day   Substance Use Topics    Alcohol use: Yes     Alcohol/week: 0.0 standard drinks of alcohol     Comment: 1 a week       Wt Readings from Last 1 Encounters:   23 (!) 171.5 kg (378 lb 2 oz)        Anesthesia Evaluation   Pt has had prior anesthetic.     History of anesthetic complications  - PONV.      ROS/MED HX  ENT/Pulmonary:     (+) sleep apnea, severe, uses CPAP,              tobacco use,  Current use,                      Neurologic:  - neg neurologic ROS     Cardiovascular:     (+)  hypertension- -   -  - -                                 Previous cardiac testing   Echo: Date: Results:    Stress Test:  Date: 2-20-20 Results:  Reading Physician Reading Date Result Priority  Fredy Rea MD  [phone] 2/20/2020 Routine  Fredy Rea MD  [phone] 2/20/2020     Result Text        The nuclear stress test is probably negative for inducible myocardial ischemia or infarction.     There is no prior study for comparison.     The sensitivity of this test is reduced due to poor image quality and body habitus. If clinical suspicion is high, can cinsider additional evaluation.            ECG Summary    ECG Baseline electrocardiogram demonstrates sinus rhythm.   The stress electrocardiogram is negative for inducible ischemic EKG changes.    Technical Comments    Cardiac Protocol A pharmacologic stress test was performed following a supine Lexiscan protocol using 0.4 mg of intravenous regadenoson administered over 10 seconds under the supervision of Giancarlo Finch. The patient reported chest discomfort and dyspnea during the stress test.    Isotope Administration Nuclear imaging was accomplished using a two day high dose protocol with 36.2 mCi of technetium tetrofosmin injected at the completion of Lexiscan infusion on 2/18/2020 and 36.4 mCi of technetium tetrofosmin at rest on 2/20/2020.    Nuclear Study Quality The  images demonstrate diaphragmatic attenuation. Final image quality is suboptimal.  The sensitivity of this test is reduced due to poor image quality and body habitus.    Stress Measurements    Baseline Vitals  Baseline HR   88      Baseline Systolic BP   134      Baseline Diastolic BP   82      Peak Stress Vitals  Max HR   109      Last Stress Systolic BP   146      Last Stress Diastolic BP   86      Exercise Data  Target HR   164      Max Predicted HR   66  %        Nuclear Perfusion    Stress Summed Score: 0 Percent Abnormal: 0.00%      The left ventricular perfusion is normal.  Small mid and basal fixed inferior defect is likely due to diaphragmatic attenuation and visceral tracer artifact.      Resting Summed Score: 0 Percent Abnormal: 0.00%      The left ventricular perfusion is normal.          Wall Score    Wall Motion    Score Index: 1.00       The left ventricular wall motion is normal.              All Measurements        Exam Ended: 02/20/20 10:24 Last Resulted: 02/20/20 15:07          ECG Reviewed:  Date: 1-9-2008 Results:  NSR  Cath:  Date: Results:      METS/Exercise Tolerance:     Hematologic:  - neg hematologic  ROS     Musculoskeletal: Comment: Bilateral knee pain      GI/Hepatic:  - neg GI/hepatic ROS  (-) GERD   Renal/Genitourinary:  - neg Renal ROS     Endo:     (+)               Obesity,       Psychiatric/Substance Use:     (+) psychiatric history depression       Infectious Disease:  - neg infectious disease ROS     Malignancy:  - neg malignancy ROS     Other:  - neg other ROS          Physical Exam    Airway        Mallampati: II   TM distance: > 3 FB   Neck ROM: full   Mouth opening: > 3 cm    Respiratory Devices and Support         Dental       (+) Multiple crowns, permanant bridges      Cardiovascular   cardiovascular exam normal       Rhythm and rate: regular and normal     Pulmonary   pulmonary exam normal        breath sounds clear to auscultation           OUTSIDE LABS:  CBC:   Lab Results   Component Value Date    WBC 6.4 06/01/2023    WBC 5.6 05/02/2022    HGB 12.3 (L) 06/01/2023    HGB 12.5 (L) 05/02/2022    HCT 40.2 06/01/2023    HCT 40.5 05/02/2022     06/01/2023     05/02/2022     BMP:   Lab Results   Component Value Date     06/01/2023     05/02/2022    POTASSIUM 4.1 06/01/2023    POTASSIUM 4.2 05/02/2022    CHLORIDE 106 06/01/2023    CHLORIDE 108 05/02/2022    CO2 28 06/01/2023    CO2 29 05/02/2022    BUN  12.2 06/01/2023    BUN 11 05/02/2022    CR 0.74 06/01/2023    CR 0.71 05/02/2022     (H) 06/01/2023     (H) 05/02/2022     COAGS:   Lab Results   Component Value Date    PTT 30 11/18/2008    INR 1.04 11/18/2008     POC: No results found for: BGM, HCG, HCGS  HEPATIC:   Lab Results   Component Value Date    ALBUMIN 4.3 06/01/2023    PROTTOTAL 6.8 06/01/2023    ALT 29 06/01/2023    AST 23 06/01/2023    ALKPHOS 91 06/01/2023    BILITOTAL 0.5 06/01/2023     OTHER:   Lab Results   Component Value Date    PH 7.42 05/10/2013    A1C 5.5 06/09/2011    DAVID 9.5 06/01/2023    PHOS 3.4 10/10/2008    MAG 2.0 10/11/2008    LIPASE 93 06/24/2009    TSH 1.69 09/17/2015       Anesthesia Plan    ASA Status:  3    NPO Status:  NPO Appropriate    Anesthesia Type: MAC.     - Reason for MAC: straight local not clinically adequate   Induction: Intravenous, Propofol.   Maintenance: TIVA.        Consents    Anesthesia Plan(s) and associated risks, benefits, and realistic alternatives discussed. Questions answered and patient/representative(s) expressed understanding.     - Discussed:     - Discussed with:  Patient      - Extended Intubation/Ventilatory Support Discussed: No.      - Patient is DNR/DNI Status: No     Use of blood products discussed: No .     Postoperative Care       PONV prophylaxis: Background Propofol Infusion     Comments:    Other Comments: The risks and benefits of anesthesia, and the alternatives where applicable, have been discussed with the patient, and they wish to proceed.               SEHRITA Palafox CRNA

## 2023-08-02 NOTE — DISCHARGE INSTRUCTIONS
[facility]    Home Care Following Endoscopy          Activity:  You have just undergone an endoscopic procedure usually performed with conscious sedation.  Do not work or operate machinery (including a car) for at least 12 hours.    I encourage you to walk and attempt to pass this air as soon as possible.    Diet:  Return to the diet you were on before your procedure but eat lightly for the first 12-24 hours.  Drink plenty of water.  Resume any regular medications unless otherwise advised by your physician.  Please begin any new medication prescribed as a result of your procedure as directed by your physician.   If you had any biopsy or polyp removed please refrain from aspirin or aspirin products for 2 days.  If on Coumadin please restart as instructed by your physician.   Pain:  You may take Tylenol as needed for pain.  Expected Recovery:  You can expect some mild abdominal fullness and/or discomfort due to the air used to inflate your intestinal tract. It is also normal to have a mild sore throat after upper endoscopy.    Call Your Physician if You Have:  After Colonoscopy:  Worsening persisting abdominal pain which is worse with activity.  Fevers (>101 degrees F), chills or shakes.  Passage of continued blood with bowel movements.     Any questions or concerns about your recovery, please call [phone] or after hours [phone] ([phone]) Nurse Advice Line.    Follow-up Care: IF you had polyps/biopsy tissue sample(s) removed.  The polyps/biopsy tissue sample(s) will be sent to pathology.    You should receive letter in your My Chart with your results within 1-2 weeks. If you do not participate in My Chart a physical letter will come in the mail in 2-3 weeks.  Please call if you have not received a notification of your results.  If asked to return to clinic please make an appointment 1 week after your procedure.  Call [phone].

## 2023-08-02 NOTE — ANESTHESIA POSTPROCEDURE EVALUATION
Patient: Sharif Giraldo    Procedure: Procedure(s):  Colonoscopy with polypectomy       Anesthesia Type:  MAC    Note:  Disposition: Outpatient   Postop Pain Control: Uneventful            Sign Out: Well controlled pain   PONV: No   Neuro/Psych: Uneventful            Sign Out: Acceptable/Baseline neuro status   Airway/Respiratory: Uneventful            Sign Out: Acceptable/Baseline resp. status   CV/Hemodynamics: Uneventful            Sign Out: Acceptable CV status   Other NRE: NONE   DID A NON-ROUTINE EVENT OCCUR? No    Event details/Postop Comments:  Pt was happy with anesthesia care.  No complications.  I will follow up with the pt if needed.           Last vitals:  Vitals Value Taken Time   /86 08/02/23 1040   Temp 98.3  F (36.8  C) 08/02/23 1040   Pulse 78 08/02/23 1040   Resp 18 08/02/23 1040   SpO2 96 % 08/02/23 1045       Electronically Signed By: SHERITA Palafox CRNA  August 2, 2023  12:30 PM

## 2023-08-02 NOTE — LETTER
"August 7, 2023      FERNANDA Giraldo  [address] [address] [id number]        Dear ,    We are writing to inform you of your test results.    Two adenomas, benign polyps removed.  A repeat exam in 7 yrs is suggested.     Resulted Orders   Surgical Pathology Exam   Result Value Ref Range    Case Report       Surgical Pathology Report                         Case: [id number]                                  Authorizing Provider:  Tr Gonzalez MD        Collected:           08/02/2023 10:17 AM          Ordering Location:     [facility]          Received:            08/02/2023 10:50 AM                                 [facility]                                                          Pathologist:           Louis Calle MD                                                                           Specimen:    Large Intestine, Colon, Transverse                                                         Final Diagnosis       Large intestine, transverse, polyps x2 (per operative note), biopsy/polypectomy:  - Tubular adenoma negative for high-grade dysplasia and malignancy.  - Sessile serrated adenoma negative for cytological dysplasia and malignancy.      Clinical Information       Clinical information pertinent to this case is available in the electronic medical record and/or specimen requisition and was reviewed by the signing pathologist.       Gross Description       A(1). Large Intestine, Colon, Transverse, :  The specimen is received in formalin, labeled with the patient's name, medical record number and other identifying information designated \"transverse colon polyp\". It consists of 4 tan soft tissue fragments, 0.1-0.4 cm.  Entirely submitted in 1 cassette.  (Malissa Zapata Biopsy Tech)      Microscopic Description       A microscopic examination is performed.       Performing Labs       The " technical component of this testing was completed at [facility]      Case Images         If you have any questions or concerns, please call the clinic at the number listed above.       Sincerely,      Tr Gonzalez MD

## 2023-08-02 NOTE — ANESTHESIA CARE TRANSFER NOTE
Patient: Sharfi Giraldo    Procedure: Procedure(s):  Colonoscopy with polypectomy       Diagnosis: Screen for colon cancer [Z12.11]  Diagnosis Additional Information: No value filed.    Anesthesia Type:   MAC     Note:    Oropharynx: oropharynx clear of all foreign objects and spontaneously breathing  Level of Consciousness: awake  Oxygen Supplementation: room air    Independent Airway: airway patency satisfactory and stable  Dentition: dentition unchanged  Vital Signs Stable: post-procedure vital signs reviewed and stable  Report to RN Given: handoff report given  Patient transferred to: Phase II    Handoff Report: Identifed the Patient, Identified the Reponsible Provider, Reviewed the pertinent medical history, Discussed the surgical course, Reviewed Intra-OP anesthesia mangement and issues during anesthesia, Set expectations for post-procedure period and Allowed opportunity for questions and acknowledgement of understanding      Vitals:  Vitals Value Taken Time   /86 08/02/23 1040   Temp 98.3  F (36.8  C) 08/02/23 1040   Pulse 78 08/02/23 1040   Resp 18 08/02/23 1040   SpO2 96 % 08/02/23 1045       Electronically Signed By: SHERITA Palafox CRNA  August 2, 2023  12:29 PM

## 2023-08-04 LAB
PATH REPORT.COMMENTS IMP SPEC: NORMAL
PATH REPORT.COMMENTS IMP SPEC: NORMAL
PATH REPORT.FINAL DX SPEC: NORMAL
PATH REPORT.GROSS SPEC: NORMAL
PATH REPORT.MICROSCOPIC SPEC OTHER STN: NORMAL
PATH REPORT.RELEVANT HX SPEC: NORMAL
PHOTO IMAGE: NORMAL

## 2024-07-18 NOTE — LETTER
4/26/2018         RE: Sharif Giraldo  [address] [address] [facility] [address]        Dear Colleague,    Thank you for referring your patient, Sharif Giraldo, to the [facility]. Please see a copy of my visit note below.    Sharif Giraldo is a 52 year old male who presents for evaluation of his chief complaint of neck pain and left arm pain.  He describes pain that radiates down the left side of his neck and shoulder, into the posterior lateral aspect of his left upper arm.  He does have some numbness and tingling down this distribution as well.  Symptoms have been present for about 1 month, with no particular event or injury.  He also states that he feels his left hand is developing some weakness.  He has just begun treatment with physical therapy.  He is not taking any pain medication.  He denies any bowel or bladder changes, or problems with balance or coordination.  However, he does also mention that he has been dealing with some vertigo type symptoms over the last couple of months as well.     Returns for follow up.  Sharp and tingling pain to the left arm, forearm, elbow, and entire hand.  EMG showed left C7 radiculopathy.  Slight hand weakness.  3 sessions of PT without improvement.  Underwent FRANCOIS 2 weeks ago with significant improvement in pain.    Past Medical History:   Diagnosis Date     HTN (hypertension) 2012     Morbid obesity with BMI of 60.0-69.9, adult (H)      LUH (obstructive sleep apnea) 5/2013    Severe        Past Medical History reviewed with patient during visit.    Past Surgical History:   Procedure Laterality Date     C AFF EYE MUSCLE SURG PROC UNLISTED  1970,1974     C ANESTH,REPAIR LO ABD HERNIA NOS  2000,2001    umbilical area     COLONOSCOPY  9/20/2013    Procedure: COLONOSCOPY;  Colonoscopy;  Surgeon: Avery Morales MD;  Location:  GI     HC EXCISE EXCESS SKIN TISSUE,OTHER      Hx; staph complications of hernia surg     HC KNEE SCOPE,  DIAGNOSTIC  1999,2000    Arthroscopy, Knee     HC REMOVAL OF TONSILS,<9 Y/O  1973    Tonsils <10y.o.     HC REPAIR INITIAL INCISIONAL HERNIA; INCARCERATED OR STRANGULATED  09/25/2002    Mesh repair of incarcerated incisional hernia, recurrent.     HERNIA REPAIR, INCISIONAL  10/09/08    Ventral herniorrhaphy/mesh placement.  Excision left elbow mass.     INJECT EPIDURAL CERVICAL N/A 4/12/2018    Procedure: INJECT EPIDURAL CERVICAL;  translaminar epidural injection at cervical 7 - throacic 1;  Surgeon: Phong Andrade MD;  Location: [facility]     Past Surgical History reviewed with patient during visit.    Current Outpatient Prescriptions   Medication     calcium carb 1250 mg, 500 mg [place],/vitamin D 200 units (OSCAL WITH D) 500-200 MG-UNIT per tablet     Calcium-Magnesium 300-300 MG TABS     FLUoxetine (PROZAC) 40 MG capsule     Glucosamine-Chondroit-Vit C-Mn (GLUCOSAMINE 1500 COMPLEX) CAPS     HYDROcodone-acetaminophen (NORCO) 5-325 MG per tablet     lisinopril-hydrochlorothiazide (PRINZIDE/ZESTORETIC) 20-25 MG per tablet     Magnesium 400 MG TABS     meloxicam (MOBIC) 15 MG tablet     order for DME     raltegravir (ISENTRESS) 400 MG tablet     tadalafil (CIALIS) 5 MG tablet     testosterone cypionate (DEPOTESTOTERONE) 200 MG/ML injection     Vitamin D, Cholecalciferol, 400 UNITS TABS     No current facility-administered medications for this visit.        Allergies   Allergen Reactions     No Known Drug Allergies        Social History     Social History     Marital status:      Spouse name: Amparo     Number of children: 0     Years of education: 16     Occupational History     [employer]     Social History Main Topics     Smoking status: Current Every Day Smoker     Packs/day: 0.01     Years: 10.00     Types: Cigars     Smokeless tobacco: Never Used      Comment:  cigars 2/ day     Alcohol use 0.0 oz/week     0 Standard drinks or equivalent per week      Comment: 1 a week      Drug use: No     Sexual  "activity: Yes     Partners: Female     Other Topics Concern      Service No     Blood Transfusions No     Caffeine Concern Yes     coffee/tea/pop: 3/d       Occupational Exposure No     Hobby Hazards Yes          Sleep Concern No     Stress Concern No     Weight Concern Yes     desire wt loss     Special Diet No     Back Care No     Exercise No     Bike Helmet No     Seat Belt Yes     Social History Narrative       Family History   Problem Relation Age of Onset     HEART DISEASE Paternal Grandfather      heart attack     CEREBROVASCULAR DISEASE Paternal Grandfather      Alzheimer Disease Paternal Grandfather      HEART DISEASE Maternal Grandmother      heart attack     DIABETES Maternal Grandmother      adult onset     Alzheimer Disease Father      snores     Alzheimer Disease Paternal Grandmother      Hypertension Mother      DIABETES Mother           ROS: 10 point ROS neg other than the symptoms noted above in the HPI.    Vital Signs: /72  Temp 96.7  F (35.9  C) (Temporal)  Ht 1.854 m (6' 1\")  Wt (!) 179.6 kg (396 lb)  BMI 52.25 kg/m2    Examination:  Constitutional:  Alert, well nourished, NAD.  HEENT: Normocephalic, atraumatic.   Pulmonary:  Without shortness of breath, normal effort.   Lymph: no lymphadenopathy to low back or LE.   Integumentary: Skin is free of rashes or lesions.   Cardiovascular:  No pitting edema of BLE.    Psych: Normal affect, no apparent distress    Neurological:  Awake  Alert  Oriented x 3  Speech clear  Cranial nerves II - XII grossly intact  Motor exam   Shoulder Abduction:  Right:  5/5   Left:  5/5  Biceps:                      Right:  5/5   Left:  5/5  Triceps:                     Right:  5/5   Left:  5/5  Wrist Extensors:       Right:  5/5   Left:  5/5  Wrist Flexors:           Right:  5/5   Left:  5/5  Intrinsics:                   Right:  5/5   Left:  4+/5  Strength is overall normal in the upper extremities, but  strength does seem to be slightly " decreased on the left  Sensation normal to bilateral upper and lower extremities.    Reflexes are 2+ in the brachial radialis and triceps. Negative Paty sign bilaterally.    Musculoskeletal:  Gait: Able to stand from a seated position. Normal non-antalgic, non-myelopathic gait.  Able to heel/toe walk without loss of balance  Cervical examination reveals good range of motion.  No tenderness to palpation of the cervical spine or paraspinous muscles bilaterally.    Imaging:   MRI of the cervical spine from [facility] was reviewed in the office today.  It demonstrates multiple levels of disc degeneration, with left paracentral disc herniation at C6-7.  There is also disc herniation at C7-T1.  Both of these contribute to bilateral foraminal stenosis.    Assessment/Plan:     Cervicalgia  Cervical disc herniation C6-7 and C7-T1  I lateral upper extremity paresthesias      Left sided disk herniations and stenosis at C5-6, C6-7, and C7-T1  Will continue to observe results from FRANCOIS  Discussed that he can do 3 FRANCOIS per year  Continuing PT for vertigo and neck pain  If symptoms recur, could consider C5-T1 ACDF      Again, thank you for allowing me to participate in the care of your patient.        Sincerely,        Martin Minor MD     No (0)

## 2024-07-20 DIAGNOSIS — I10 ESSENTIAL HYPERTENSION WITH GOAL BLOOD PRESSURE LESS THAN 140/90: ICD-10-CM

## 2024-07-22 RX ORDER — LISINOPRIL AND HYDROCHLOROTHIAZIDE 20; 25 MG/1; MG/1
1 TABLET ORAL DAILY
Qty: 90 TABLET | Refills: 3 | Status: SHIPPED | OUTPATIENT
Start: 2024-07-22

## 2024-07-31 ENCOUNTER — OFFICE VISIT (OUTPATIENT)
Dept: INTERNAL MEDICINE | Facility: CLINIC | Age: 59
End: 2024-07-31
Payer: COMMERCIAL

## 2024-07-31 VITALS
HEIGHT: 72 IN | SYSTOLIC BLOOD PRESSURE: 138 MMHG | OXYGEN SATURATION: 96 % | RESPIRATION RATE: 20 BRPM | DIASTOLIC BLOOD PRESSURE: 84 MMHG | HEART RATE: 76 BPM | TEMPERATURE: 96.3 F | BODY MASS INDEX: 42.66 KG/M2 | WEIGHT: 315 LBS

## 2024-07-31 DIAGNOSIS — M17.10 ARTHRITIS OF KNEE: ICD-10-CM

## 2024-07-31 DIAGNOSIS — Z00.00 ROUTINE GENERAL MEDICAL EXAMINATION AT A HEALTH CARE FACILITY: ICD-10-CM

## 2024-07-31 DIAGNOSIS — Z12.5 SCREENING FOR PROSTATE CANCER: ICD-10-CM

## 2024-07-31 DIAGNOSIS — E78.5 HYPERLIPIDEMIA LDL GOAL <130: ICD-10-CM

## 2024-07-31 DIAGNOSIS — E66.01 MORBID OBESITY (H): ICD-10-CM

## 2024-07-31 DIAGNOSIS — I10 BENIGN ESSENTIAL HYPERTENSION: ICD-10-CM

## 2024-07-31 DIAGNOSIS — Z11.4 SCREENING FOR HIV (HUMAN IMMUNODEFICIENCY VIRUS): Primary | ICD-10-CM

## 2024-07-31 LAB
ALBUMIN SERPL BCG-MCNC: 4.1 G/DL (ref 3.5–5.2)
ALP SERPL-CCNC: 83 U/L (ref 40–150)
ALT SERPL W P-5'-P-CCNC: 38 U/L (ref 0–70)
ANION GAP SERPL CALCULATED.3IONS-SCNC: 9 MMOL/L (ref 7–15)
AST SERPL W P-5'-P-CCNC: 23 U/L (ref 0–45)
BILIRUB SERPL-MCNC: 0.5 MG/DL
BUN SERPL-MCNC: 12.4 MG/DL (ref 8–23)
CALCIUM SERPL-MCNC: 9.4 MG/DL (ref 8.8–10.4)
CHLORIDE SERPL-SCNC: 103 MMOL/L (ref 98–107)
CHOLEST SERPL-MCNC: 127 MG/DL
CREAT SERPL-MCNC: 0.69 MG/DL (ref 0.67–1.17)
EGFRCR SERPLBLD CKD-EPI 2021: >90 ML/MIN/1.73M2
ERYTHROCYTE [DISTWIDTH] IN BLOOD BY AUTOMATED COUNT: 17.8 % (ref 10–15)
FASTING STATUS PATIENT QL REPORTED: YES
FASTING STATUS PATIENT QL REPORTED: YES
GLUCOSE SERPL-MCNC: 144 MG/DL (ref 70–99)
HCO3 SERPL-SCNC: 27 MMOL/L (ref 22–29)
HCT VFR BLD AUTO: 38 % (ref 40–53)
HDLC SERPL-MCNC: 39 MG/DL
HGB BLD-MCNC: 12.1 G/DL (ref 13.3–17.7)
HIV 1+2 AB+HIV1 P24 AG SERPL QL IA: NONREACTIVE
LDLC SERPL CALC-MCNC: 71 MG/DL
MCH RBC QN AUTO: 21 PG (ref 26.5–33)
MCHC RBC AUTO-ENTMCNC: 31.8 G/DL (ref 31.5–36.5)
MCV RBC AUTO: 66 FL (ref 78–100)
NONHDLC SERPL-MCNC: 88 MG/DL
PLATELET # BLD AUTO: 171 10E3/UL (ref 150–450)
POTASSIUM SERPL-SCNC: 4 MMOL/L (ref 3.4–5.3)
PROT SERPL-MCNC: 6.8 G/DL (ref 6.4–8.3)
PSA SERPL DL<=0.01 NG/ML-MCNC: 2.69 NG/ML (ref 0–4.5)
RBC # BLD AUTO: 5.76 10E6/UL (ref 4.4–5.9)
SODIUM SERPL-SCNC: 139 MMOL/L (ref 135–145)
TRIGL SERPL-MCNC: 87 MG/DL
WBC # BLD AUTO: 5.5 10E3/UL (ref 4–11)

## 2024-07-31 PROCEDURE — 87389 HIV-1 AG W/HIV-1&-2 AB AG IA: CPT | Performed by: INTERNAL MEDICINE

## 2024-07-31 PROCEDURE — 96127 BRIEF EMOTIONAL/BEHAV ASSMT: CPT | Performed by: INTERNAL MEDICINE

## 2024-07-31 PROCEDURE — 99396 PREV VISIT EST AGE 40-64: CPT | Performed by: INTERNAL MEDICINE

## 2024-07-31 PROCEDURE — G0103 PSA SCREENING: HCPCS | Performed by: INTERNAL MEDICINE

## 2024-07-31 PROCEDURE — 80061 LIPID PANEL: CPT | Performed by: INTERNAL MEDICINE

## 2024-07-31 PROCEDURE — 99213 OFFICE O/P EST LOW 20 MIN: CPT | Mod: 25 | Performed by: INTERNAL MEDICINE

## 2024-07-31 PROCEDURE — 36415 COLL VENOUS BLD VENIPUNCTURE: CPT | Performed by: INTERNAL MEDICINE

## 2024-07-31 PROCEDURE — 80053 COMPREHEN METABOLIC PANEL: CPT | Performed by: INTERNAL MEDICINE

## 2024-07-31 PROCEDURE — 85027 COMPLETE CBC AUTOMATED: CPT | Performed by: INTERNAL MEDICINE

## 2024-07-31 RX ORDER — MELOXICAM 15 MG/1
15 TABLET ORAL DAILY
Qty: 90 TABLET | Refills: 3 | Status: SHIPPED | OUTPATIENT
Start: 2024-07-31

## 2024-07-31 SDOH — HEALTH STABILITY: PHYSICAL HEALTH: ON AVERAGE, HOW MANY DAYS PER WEEK DO YOU ENGAGE IN MODERATE TO STRENUOUS EXERCISE (LIKE A BRISK WALK)?: 0 DAYS

## 2024-07-31 ASSESSMENT — PATIENT HEALTH QUESTIONNAIRE - PHQ9
SUM OF ALL RESPONSES TO PHQ QUESTIONS 1-9: 6
10. IF YOU CHECKED OFF ANY PROBLEMS, HOW DIFFICULT HAVE THESE PROBLEMS MADE IT FOR YOU TO DO YOUR WORK, TAKE CARE OF THINGS AT HOME, OR GET ALONG WITH OTHER PEOPLE: SOMEWHAT DIFFICULT
SUM OF ALL RESPONSES TO PHQ QUESTIONS 1-9: 6

## 2024-07-31 ASSESSMENT — SOCIAL DETERMINANTS OF HEALTH (SDOH): HOW OFTEN DO YOU GET TOGETHER WITH FRIENDS OR RELATIVES?: ONCE A WEEK

## 2024-07-31 ASSESSMENT — PAIN SCALES - GENERAL: PAINLEVEL: MILD PAIN (3)

## 2024-07-31 NOTE — PROGRESS NOTES
"Preventive Care Visit  [facility]  Edd Tr Resendiz DO, Internal Medicine  Jul 31, 2024      Assessment & Plan     Routine general medical examination at a health care facility      Benign essential hypertension    - CBC with platelets; Future  - Comprehensive metabolic panel (BMP + Alb, Alk Phos, ALT, AST, Total. Bili, TP); Future  - UA Macroscopic with reflex to Microscopic and Culture - Lab Collect; Future  - CBC with platelets  - Comprehensive metabolic panel (BMP + Alb, Alk Phos, ALT, AST, Total. Bili, TP)    Arthritis of knee    - meloxicam (MOBIC) 15 MG tablet; Take 1 tablet (15 mg) by mouth daily    Morbid obesity (H)  Recommend weight loss responsible caloric restriction.  Offered bariatric consult.    Hyperlipidemia LDL goal <130    - Lipid panel reflex to direct LDL Fasting; Future  - Lipid panel reflex to direct LDL Fasting    Screening for prostate cancer    - PSA, screen; Future  - PSA, screen    Screening for HIV (human immunodeficiency virus)    - HIV Antigen Antibody Combo; Future  - HIV Antigen Antibody Combo    Patient has been advised of split billing requirements and indicates understanding: Yes        Nicotine/Tobacco Cessation  He reports that he has been smoking cigars and cigarettes. He has a 0.1 pack-year smoking history. He has never used smokeless tobacco.  Nicotine/Tobacco Cessation Plan  Information offered: Patient not interested at this time      BMI  Estimated body mass index is 52.41 kg/m  as calculated from the following:    Height as of this encounter: 1.835 m (6' 0.25\").    Weight as of this encounter: 176.5 kg (389 lb 1.6 oz).   Weight management plan: Discussed healthy diet and exercise guidelines    Counseling  Appropriate preventive services were addressed with this patient via screening, questionnaire, or discussion as appropriate for fall prevention, nutrition, physical activity, Tobacco-use cessation, weight loss and cognition.  " Checklist reviewing preventive services available has been given to the patient.  Reviewed patient's diet, addressing concerns and/or questions.   He is at risk for psychosocial distress and has been provided with information to reduce risk.   The patient's PHQ-9 score is consistent with mild depression. He was provided with information regarding depression.       MEDICATIONS:  Continue current medications without change  Work on weight loss  Regular exercise    Subjective   FERNANDA is a 58 year old, presenting for the following:  Physical        7/31/2024     6:58 AM   Additional Questions   Roomed by Huyen DOWELL        Health Care Directive  Patient does not have a Health Care Directive or Living Will: Discussed advance care planning with patient; however, patient declined at this time.    HPI              7/31/2024   General Health   How would you rate your overall physical health? (!) POOR   Feel stress (tense, anxious, or unable to sleep) Only a little      (!) STRESS CONCERN      7/31/2024   Nutrition   Three or more servings of calcium each day? Yes   Diet: Regular (no restrictions)   How many servings of fruit and vegetables per day? (!) 2-3   How many sweetened beverages each day? (!) 4+            7/31/2024   Exercise   Days per week of moderate/strenous exercise 0 days      (!) EXERCISE CONCERN      7/31/2024   Social Factors   Frequency of gathering with friends or relatives Once a week   Worry food won't last until get money to buy more No   Food not last or not have enough money for food? No   Do you have housing? (Housing is defined as stable permanent housing and does not include staying ouside in a car, in a tent, in an abandoned building, in an overnight shelter, or couch-surfing.) Yes   Are you worried about losing your housing? No   Lack of transportation? No   Unable to get utilities (heat,electricity)? No            7/31/2024   Fall Risk   Fallen 2 or more times in the past year? No   Trouble with  walking or balance? Yes   Gait Speed Test (Document in seconds) 3.8   Gait Speed Test Interpretation Less than or equal to 5.00 seconds - PASS             7/31/2024   Dental   Dentist two times every year? Yes            7/31/2024   TB Screening   Were you born outside of the [place]? No          Today's PHQ-9 Score:       7/31/2024     6:50 AM   PHQ-9 SCORE   PHQ-9 Total Score MyChart 6 (Mild depression)   PHQ-9 Total Score 6         7/31/2024   Substance Use   Alcohol more than 3/day or more than 7/wk No   Do you use any other substances recreationally? No        Social History     Tobacco Use    Smoking status: Every Day     Current packs/day: 0.01     Average packs/day: (0.1 ttl pk-yrs)     Types: Cigars, Cigarettes    Smokeless tobacco: Never    Tobacco comments:      cigars 2/ day   Vaping Use    Vaping status: Never Used   Substance Use Topics    Alcohol use: Yes     Alcohol/week: 0.0 standard drinks of alcohol     Comment: 1 a week     Drug use: No             7/31/2024   One time HIV Screening   Previous HIV test? No          7/31/2024   STI Screening   New sexual partner(s) since last STI/HIV test? No      Last PSA:   PSA   Date Value Ref Range Status   04/06/2021 2.03 0 - 4 ug/L Final     Comment:     Assay Method:  Chemiluminescence using Siemens Vista analyzer     Prostate Specific Antigen Screen   Date Value Ref Range Status   06/01/2023 2.29 0.00 - 3.50 ng/mL Final   05/02/2022 2.23 0.00 - 4.00 ug/L Final     ASCVD Risk   The ASCVD Risk score (Callum MORENO, et al., 2019) failed to calculate for the following reasons:    The valid total cholesterol range is 130 to 320 mg/dL           Reviewed and updated as needed this visit by Provider                    Past Medical History:   Diagnosis Date    HTN (hypertension) 01/01/2012    Morbid obesity with BMI of 60.0-69.9, adult (H)     Motion sickness     LUH (obstructive sleep apnea) 05/01/2013    Severe     PONV (postoperative nausea and vomiting)       Past Surgical History:   Procedure Laterality Date    COLONOSCOPY  9/20/2013    Procedure: COLONOSCOPY;  Colonoscopy;  Surgeon: Avery Morales MD;  Location:  GI    COLONOSCOPY N/A 8/2/2023    Procedure: Colonoscopy with polypectomy;  Surgeon: Tr Gonzalez MD;  Location:  GI     EXCISE EXCESS SKIN TISSUE,OTHER      Hx; staph complications of hernia surg    HC KNEE SCOPE, DIAGNOSTIC  1999,2000    Arthroscopy, Knee    HC REMOVAL OF TONSILS,<9 Y/O  1973    Tonsils <10y.o.    HERNIA REPAIR, INCISIONAL  10/09/08    Ventral herniorrhaphy/mesh placement.  Excision left elbow mass.    INJECT EPIDURAL CERVICAL N/A 4/12/2018    Procedure: INJECT EPIDURAL CERVICAL;  translaminar epidural injection at cervical 7 - throacic 1;  Surgeon: Phong Andrade MD;  Location:  OR    [facility] AFF EYE MUSCLE SURG PROC UNLISTED  1970,1974    Z ANESTH,REPAIR LO ABD HERNIA NOS  2000,2001    umbilical area    ZZ REPAIR INITIAL INCISIONAL HERNIA; INCARCERATED OR STRANGULATED  09/25/2002    Mesh repair of incarcerated incisional hernia, recurrent.     Lab work is in process  Labs reviewed in EPIC  BP Readings from Last 3 Encounters:   07/31/24 138/84   08/02/23 (!) 151/86   06/01/23 138/72    Wt Readings from Last 3 Encounters:   07/31/24 (!) 176.5 kg (389 lb 1.6 oz)   06/01/23 (!) 171.5 kg (378 lb 2 oz)   05/19/23 (!) 174.2 kg (384 lb)                  Patient Active Problem List   Diagnosis    Umbilical hernia    Morbid obesity (H)    CARDIOVASCULAR SCREENING; LDL GOAL LESS THAN 130    Benign essential hypertension    Nocturia    Anemia    LUH (obstructive sleep apnea)-very severe ()    Sleep related hypoventilation/hypoxemia in other disease    Hypogonadism male    Thalassemia carrier    Major depressive disorder, recurrent episode, mild (H24)    Osteoarthritis of spine with radiculopathy, cervical region    Carpal tunnel syndrome of left wrist    Cervical pain    Cervical radiculopathy     Past Surgical History:    Procedure Laterality Date    COLONOSCOPY  9/20/2013    Procedure: COLONOSCOPY;  Colonoscopy;  Surgeon: Avery Morales MD;  Location:  GI    COLONOSCOPY N/A 8/2/2023    Procedure: Colonoscopy with polypectomy;  Surgeon: Tr Gonzalez MD;  Location:  GI    HC EXCISE EXCESS SKIN TISSUE,OTHER      Hx; staph complications of hernia surg    HC KNEE SCOPE, DIAGNOSTIC  1999,2000    Arthroscopy, Knee    HC REMOVAL OF TONSILS,<11 Y/O  1973    Tonsils <10y.o.    HERNIA REPAIR, INCISIONAL  10/09/08    Ventral herniorrhaphy/mesh placement.  Excision left elbow mass.    INJECT EPIDURAL CERVICAL N/A 4/12/2018    Procedure: INJECT EPIDURAL CERVICAL;  translaminar epidural injection at cervical 7 - throacic 1;  Surgeon: Phong Andrade MD;  Location:  OR    [facility] AFF EYE MUSCLE SURG PROC UNLISTED  1970,1974    Z ANESTH,REPAIR LO ABD HERNIA NOS  2000,2001    umbilical area    ZZ REPAIR INITIAL INCISIONAL HERNIA; INCARCERATED OR STRANGULATED  09/25/2002    Mesh repair of incarcerated incisional hernia, recurrent.       Social History     Tobacco Use    Smoking status: Every Day     Current packs/day: 0.01     Average packs/day: (0.1 ttl pk-yrs)     Types: Cigars, Cigarettes    Smokeless tobacco: Never    Tobacco comments:      cigars 2/ day   Substance Use Topics    Alcohol use: Yes     Alcohol/week: 0.0 standard drinks of alcohol     Comment: 1 a week      Family History   Problem Relation Age of Onset    Heart Disease Paternal Grandfather         heart attack    Cerebrovascular Disease Paternal Grandfather     Alzheimer Disease Paternal Grandfather     Heart Disease Maternal Grandmother         heart attack    Diabetes Maternal Grandmother         adult onset    Alzheimer Disease Father         snores    Alzheimer Disease Paternal Grandmother     Hypertension Mother     Diabetes Mother          Current Outpatient Medications   Medication Sig Dispense Refill    calcium carb 1250 mg, 500 mg [place],/vitamin D 200 units  (OSCAL WITH D) 500-200 MG-UNIT per tablet Take 600mg daily 1 tablet     lisinopril-hydrochlorothiazide (ZESTORETIC) 20-25 MG tablet TAKE 1 TABLET BY MOUTH DAILY 90 tablet 3    Magnesium 400 MG TABS Take 400 mg % by mouth 2 times daily      meloxicam (MOBIC) 15 MG tablet Take 1 tablet (15 mg) by mouth daily 90 tablet 3    Vitamin D, Cholecalciferol, 400 UNITS TABS Take 400 Units by mouth daily      bisacodyl (DULCOLAX) 5 MG EC tablet 2 days prior to procedure, take 2 tablets at 4 pm. 1 day prior to procedure, take 2 tablets at 4 pm. For additional instructions refer to your colonoscopy prep instructions. (Patient not taking: Reported on 7/31/2024) 4 tablet 0    Calcium-Magnesium 300-300 MG TABS Take 300 mg by mouth daily (Patient not taking: Reported on 7/31/2024)      order for DME Dispensed 1 Dorsal (Anterior) Night Splint, Size L/XL, with [facility] agreement signed by patient. Angy Garner [facility], November 30, 2015 (Patient not taking: Reported on 7/31/2024) 1 Device 0    polyethylene glycol (GOLYTELY) 236 g suspension 2 days prior at 5pm, mix and drink half of a jug of Golytely. Drink an 8 oz. glass of Golytely every 15 minutes until half of the jug is gone. Place remainder of Golytely in the refrigerator. 1 day prior at 5 pm, drink the 2nd half of a jug of Golytely bowel prep. 6 hours before your check-in time, drink an 8 oz. glass of Golytely every 15 minutes until half of the 2nd jug of Golytely is gone. Discard remainder of second jug. (Patient not taking: Reported on 7/31/2024) 8000 mL 0     Allergies   Allergen Reactions    No Known Drug Allergy          Review of Systems  CONSTITUTIONAL: NEGATIVE for fever, chills, change in weight  INTEGUMENTARY/SKIN: NEGATIVE for worrisome rashes, moles or lesions  EYES: NEGATIVE for vision changes or irritation  ENT/MOUTH: NEGATIVE for ear, mouth and throat problems  RESP: NEGATIVE for significant cough or SOB  BREAST: NEGATIVE for masses, tenderness or discharge  CV:  "NEGATIVE for chest pain, palpitations or peripheral edema  GI: NEGATIVE for nausea, abdominal pain, heartburn, or change in bowel habits   male : Denies frequency, urgency, hesitancy or dysuria.  Does note possibility of occasional hematuria.  Acknowledges erectile dysfunction.  Previously tried Cialis without benefit.  Does not wish to pursue with urologist.  MUSCULOSKELETAL: Complains of bilateral knee pain.  Significant interferes with walking.  Patient not yet ready to pursue orthopedic intervention.  NEURO: NEGATIVE for weakness, dizziness or paresthesias  ENDOCRINE: NEGATIVE for temperature intolerance, skin/hair changes  HEME: NEGATIVE for bleeding problems  PSYCHIATRIC: NEGATIVE for changes in mood or affect     Objective    Exam  /84 (BP Location: Right arm, Patient Position: Chair)   Pulse 76   Temp (!) 96.3  F (35.7  C) (Temporal)   Resp 20   Ht 1.835 m (6' 0.25\")   Wt (!) 176.5 kg (389 lb 1.6 oz)   SpO2 96%   BMI 52.41 kg/m     Estimated body mass index is 52.41 kg/m  as calculated from the following:    Height as of this encounter: 1.835 m (6' 0.25\").    Weight as of this encounter: 176.5 kg (389 lb 1.6 oz).    Physical Exam  GENERAL: alert, no distress, and obese  EYES: Eyes grossly normal to inspection, PERRL and conjunctivae and sclerae normal  HENT: ear canals and TM's normal, nose and mouth without ulcers or lesions  NECK: no adenopathy, no asymmetry, masses, or scars  RESP: lungs clear to auscultation - no rales, rhonchi or wheezes  CV: regular rate and rhythm, normal S1 S2, no S3 or S4, no murmur, click or rub, no peripheral edema  ABDOMEN: soft, nontender, without hepatosplenomegaly or masses, bowel sounds normal, and scarring is consistent with previous hernia repairs.  MS: Significant crepitance of both knees noted.  Significantly affects patient's ambulatory gait.  SKIN: no suspicious lesions or rashes  NEURO: Normal strength and tone, mentation intact and speech normal  PSYCH: " mentation appears normal, affect normal/bright        I have reviewed the patient's vaccination schedule and discussed the benefits of prophylactic vaccination in detail.  I recommend the patient contact their pharmacist for vaccinations.  Discussed that most insurance companies now favor reimbursement to the pharmacies and it will financially behoove the patient to have vaccinations performed at their pharmacy.        Signed Electronically by: Edd Resendiz DO

## 2025-07-28 DIAGNOSIS — I10 ESSENTIAL HYPERTENSION WITH GOAL BLOOD PRESSURE LESS THAN 140/90: ICD-10-CM

## 2025-07-28 RX ORDER — LISINOPRIL AND HYDROCHLOROTHIAZIDE 20; 25 MG/1; MG/1
1 TABLET ORAL DAILY
Qty: 90 TABLET | Refills: 0 | Status: SHIPPED | OUTPATIENT
Start: 2025-07-28

## 2025-08-13 ENCOUNTER — OFFICE VISIT (OUTPATIENT)
Dept: INTERNAL MEDICINE | Facility: CLINIC | Age: 60
End: 2025-08-13
Payer: COMMERCIAL

## 2025-08-13 VITALS
HEIGHT: 72 IN | OXYGEN SATURATION: 97 % | WEIGHT: 315 LBS | BODY MASS INDEX: 42.66 KG/M2 | TEMPERATURE: 97.5 F | HEART RATE: 81 BPM | DIASTOLIC BLOOD PRESSURE: 80 MMHG | SYSTOLIC BLOOD PRESSURE: 150 MMHG | RESPIRATION RATE: 16 BRPM

## 2025-08-13 DIAGNOSIS — E78.5 HYPERLIPIDEMIA LDL GOAL <130: ICD-10-CM

## 2025-08-13 DIAGNOSIS — I10 BENIGN ESSENTIAL HYPERTENSION: Primary | ICD-10-CM

## 2025-08-13 DIAGNOSIS — E66.01 MORBID OBESITY (H): ICD-10-CM

## 2025-08-13 DIAGNOSIS — Z12.5 SCREENING FOR PROSTATE CANCER: ICD-10-CM

## 2025-08-13 DIAGNOSIS — I10 ESSENTIAL HYPERTENSION WITH GOAL BLOOD PRESSURE LESS THAN 140/90: ICD-10-CM

## 2025-08-13 LAB
ALBUMIN SERPL BCG-MCNC: 4.4 G/DL (ref 3.5–5.2)
ALBUMIN UR-MCNC: NEGATIVE MG/DL
ALP SERPL-CCNC: 87 U/L (ref 40–150)
ALT SERPL W P-5'-P-CCNC: 40 U/L (ref 0–70)
ANION GAP SERPL CALCULATED.3IONS-SCNC: 11 MMOL/L (ref 7–15)
APPEARANCE UR: CLEAR
AST SERPL W P-5'-P-CCNC: 29 U/L (ref 0–45)
BILIRUB SERPL-MCNC: 0.9 MG/DL
BILIRUB UR QL STRIP: NEGATIVE
BUN SERPL-MCNC: 8.7 MG/DL (ref 8–23)
CALCIUM SERPL-MCNC: 9.4 MG/DL (ref 8.8–10.4)
CHLORIDE SERPL-SCNC: 100 MMOL/L (ref 98–107)
CHOLEST SERPL-MCNC: 109 MG/DL
COLOR UR AUTO: NORMAL
CREAT SERPL-MCNC: 0.64 MG/DL (ref 0.67–1.17)
EGFRCR SERPLBLD CKD-EPI 2021: >90 ML/MIN/1.73M2
ERYTHROCYTE [DISTWIDTH] IN BLOOD BY AUTOMATED COUNT: 17.9 % (ref 10–15)
FASTING STATUS PATIENT QL REPORTED: YES
FASTING STATUS PATIENT QL REPORTED: YES
GLUCOSE SERPL-MCNC: 132 MG/DL (ref 70–99)
GLUCOSE UR STRIP-MCNC: NEGATIVE MG/DL
HCO3 SERPL-SCNC: 26 MMOL/L (ref 22–29)
HCT VFR BLD AUTO: 38.3 % (ref 40–53)
HDLC SERPL-MCNC: 41 MG/DL
HGB BLD-MCNC: 12.3 G/DL (ref 13.3–17.7)
HGB UR QL STRIP: NEGATIVE
KETONES UR STRIP-MCNC: NEGATIVE MG/DL
LDLC SERPL CALC-MCNC: 50 MG/DL
LEUKOCYTE ESTERASE UR QL STRIP: NEGATIVE
MCH RBC QN AUTO: 20.8 PG (ref 26.5–33)
MCHC RBC AUTO-ENTMCNC: 32.1 G/DL (ref 31.5–36.5)
MCV RBC AUTO: 64.9 FL (ref 78–100)
NITRATE UR QL: NEGATIVE
NONHDLC SERPL-MCNC: 68 MG/DL
PH UR STRIP: 6.5 [PH] (ref 5–7)
PLATELET # BLD AUTO: 227 10E3/UL (ref 150–450)
POTASSIUM SERPL-SCNC: 3.8 MMOL/L (ref 3.4–5.3)
PROT SERPL-MCNC: 6.9 G/DL (ref 6.4–8.3)
PSA SERPL DL<=0.01 NG/ML-MCNC: 3.68 NG/ML (ref 0–4.5)
RBC # BLD AUTO: 5.9 10E6/UL (ref 4.4–5.9)
SODIUM SERPL-SCNC: 137 MMOL/L (ref 135–145)
SP GR UR STRIP: 1 (ref 1–1.03)
TRIGL SERPL-MCNC: 89 MG/DL
UROBILINOGEN UR STRIP-MCNC: NORMAL MG/DL
WBC # BLD AUTO: 9.23 10E3/UL (ref 4–11)

## 2025-08-13 PROCEDURE — 85027 COMPLETE CBC AUTOMATED: CPT | Performed by: INTERNAL MEDICINE

## 2025-08-13 PROCEDURE — G0103 PSA SCREENING: HCPCS | Performed by: INTERNAL MEDICINE

## 2025-08-13 PROCEDURE — 80053 COMPREHEN METABOLIC PANEL: CPT | Performed by: INTERNAL MEDICINE

## 2025-08-13 PROCEDURE — 36415 COLL VENOUS BLD VENIPUNCTURE: CPT | Performed by: INTERNAL MEDICINE

## 2025-08-13 PROCEDURE — 81003 URINALYSIS AUTO W/O SCOPE: CPT | Performed by: INTERNAL MEDICINE

## 2025-08-13 PROCEDURE — 80061 LIPID PANEL: CPT | Performed by: INTERNAL MEDICINE

## 2025-08-13 SDOH — HEALTH STABILITY: PHYSICAL HEALTH: ON AVERAGE, HOW MANY MINUTES DO YOU ENGAGE IN EXERCISE AT THIS LEVEL?: 30 MIN

## 2025-08-13 SDOH — HEALTH STABILITY: PHYSICAL HEALTH: ON AVERAGE, HOW MANY DAYS PER WEEK DO YOU ENGAGE IN MODERATE TO STRENUOUS EXERCISE (LIKE A BRISK WALK)?: 1 DAY

## 2025-08-13 ASSESSMENT — PATIENT HEALTH QUESTIONNAIRE - PHQ9
SUM OF ALL RESPONSES TO PHQ QUESTIONS 1-9: 2
SUM OF ALL RESPONSES TO PHQ QUESTIONS 1-9: 2
10. IF YOU CHECKED OFF ANY PROBLEMS, HOW DIFFICULT HAVE THESE PROBLEMS MADE IT FOR YOU TO DO YOUR WORK, TAKE CARE OF THINGS AT HOME, OR GET ALONG WITH OTHER PEOPLE: NOT DIFFICULT AT ALL

## 2025-08-13 ASSESSMENT — SOCIAL DETERMINANTS OF HEALTH (SDOH): HOW OFTEN DO YOU GET TOGETHER WITH FRIENDS OR RELATIVES?: ONCE A WEEK

## 2025-08-13 ASSESSMENT — PAIN SCALES - GENERAL: PAINLEVEL_OUTOF10: NO PAIN (0)

## 2025-08-18 ENCOUNTER — OFFICE VISIT (OUTPATIENT)
Dept: INTERNAL MEDICINE | Facility: CLINIC | Age: 60
End: 2025-08-18
Payer: COMMERCIAL

## 2025-08-18 VITALS
WEIGHT: 315 LBS | RESPIRATION RATE: 16 BRPM | HEART RATE: 93 BPM | SYSTOLIC BLOOD PRESSURE: 150 MMHG | DIASTOLIC BLOOD PRESSURE: 60 MMHG | HEIGHT: 72 IN | TEMPERATURE: 97 F | OXYGEN SATURATION: 96 % | BODY MASS INDEX: 42.66 KG/M2

## 2025-08-18 DIAGNOSIS — M17.10 ARTHRITIS OF KNEE: Primary | ICD-10-CM

## 2025-08-18 PROCEDURE — 20611 DRAIN/INJ JOINT/BURSA W/US: CPT | Mod: LT | Performed by: INTERNAL MEDICINE

## 2025-08-18 RX ORDER — TRIAMCINOLONE ACETONIDE 40 MG/ML
80 INJECTION, SUSPENSION INTRA-ARTICULAR; INTRAMUSCULAR ONCE
Status: COMPLETED | OUTPATIENT
Start: 2025-08-18 | End: 2025-08-18

## 2025-08-18 RX ADMIN — TRIAMCINOLONE ACETONIDE 80 MG: 40 INJECTION, SUSPENSION INTRA-ARTICULAR; INTRAMUSCULAR at 08:19

## 2025-08-18 ASSESSMENT — PAIN SCALES - GENERAL: PAINLEVEL_OUTOF10: MILD PAIN (1)

## (undated) DEVICE — TRAY SINGLE DOSE EPIDURAL ANESTHESIA

## (undated) DEVICE — SYR 10ML PERFIX LL 332152

## (undated) DEVICE — TUBING SUCTION 6"X3/16" N56A

## (undated) DEVICE — KIT ENDO TURNOVER/PROCEDURE CARRY-ON 101822

## (undated) DEVICE — SYR 05ML LL W/O NDL

## (undated) DEVICE — SOL WATER IRRIG 1000ML BOTTLE 2F7114

## (undated) DEVICE — TUBING EXTENSION SET MICROBORE 21CM LL 6N8374

## (undated) DEVICE — NDL SPINAL 22GA 5" QUINCKE 405148

## (undated) DEVICE — PREP CHLORAPREP 26ML TINTED ORANGE  260815

## (undated) DEVICE — GLOVE PROTEXIS W/NEU-THERA 7.5  2D73TE75

## (undated) DEVICE — SYR 10ML LL W/O NDL

## (undated) DEVICE — NDL COUNTER 20CT 31142493

## (undated) RX ORDER — CITRIC ACID/SODIUM CITRATE 334-500MG
SOLUTION, ORAL ORAL
Status: DISPENSED
Start: 2018-04-12